# Patient Record
Sex: FEMALE | Race: WHITE | NOT HISPANIC OR LATINO | Employment: OTHER | URBAN - METROPOLITAN AREA
[De-identification: names, ages, dates, MRNs, and addresses within clinical notes are randomized per-mention and may not be internally consistent; named-entity substitution may affect disease eponyms.]

---

## 2017-07-06 ENCOUNTER — HOSPITAL ENCOUNTER (OUTPATIENT)
Dept: RADIOLOGY | Facility: MEDICAL CENTER | Age: 72
End: 2017-07-06
Attending: INTERNAL MEDICINE
Payer: MEDICARE

## 2017-07-06 DIAGNOSIS — R52 PAIN: ICD-10-CM

## 2017-07-06 DIAGNOSIS — Z12.31 VISIT FOR SCREENING MAMMOGRAM: ICD-10-CM

## 2017-07-06 PROCEDURE — 77063 BREAST TOMOSYNTHESIS BI: CPT

## 2017-07-06 PROCEDURE — 73522 X-RAY EXAM HIPS BI 3-4 VIEWS: CPT

## 2018-07-25 ENCOUNTER — HOSPITAL ENCOUNTER (OUTPATIENT)
Dept: RADIOLOGY | Facility: MEDICAL CENTER | Age: 73
End: 2018-07-25
Attending: INTERNAL MEDICINE
Payer: MEDICARE

## 2018-07-25 DIAGNOSIS — Z12.31 VISIT FOR SCREENING MAMMOGRAM: ICD-10-CM

## 2018-07-25 PROCEDURE — 77067 SCR MAMMO BI INCL CAD: CPT

## 2019-03-12 ENCOUNTER — PATIENT OUTREACH (OUTPATIENT)
Dept: HEALTH INFORMATION MANAGEMENT | Facility: OTHER | Age: 74
End: 2019-03-12

## 2019-03-12 NOTE — PROGRESS NOTES
Call Back at Later Time - Patient stated she will call back after 4/16/19 she wants to wait until the provider recommends her with a good primary provider.     Please transfer to Patient Outreach Team at [phone] when patient returns call.    WebIZ Checked & Epic Updated:  yes  CPOX (Varicella)   Tdap   Zoster Tommy (Shingrix)     [facility] Verified: yes  [facility] Provider-Maryjo Rutledge  [facility] Provider-Yamel Mae    Attempt # 1

## 2019-03-27 ENCOUNTER — OFFICE VISIT (OUTPATIENT)
Dept: URGENT CARE | Facility: PHYSICIAN GROUP | Age: 74
End: 2019-03-27
Payer: MEDICARE

## 2019-03-27 ENCOUNTER — HOSPITAL ENCOUNTER (EMERGENCY)
Facility: MEDICAL CENTER | Age: 74
End: 2019-03-27
Attending: EMERGENCY MEDICINE
Payer: MEDICARE

## 2019-03-27 VITALS
HEIGHT: 62 IN | BODY MASS INDEX: 23.21 KG/M2 | DIASTOLIC BLOOD PRESSURE: 98 MMHG | RESPIRATION RATE: 16 BRPM | WEIGHT: 126.1 LBS | HEART RATE: 83 BPM | OXYGEN SATURATION: 95 % | SYSTOLIC BLOOD PRESSURE: 174 MMHG | TEMPERATURE: 98.7 F

## 2019-03-27 VITALS
SYSTOLIC BLOOD PRESSURE: 144 MMHG | WEIGHT: 125 LBS | OXYGEN SATURATION: 95 % | DIASTOLIC BLOOD PRESSURE: 100 MMHG | HEART RATE: 89 BPM | BODY MASS INDEX: 23 KG/M2 | TEMPERATURE: 98.1 F | HEIGHT: 62 IN | RESPIRATION RATE: 12 BRPM

## 2019-03-27 DIAGNOSIS — S61.411A LACERATION OF RIGHT HAND, FOREIGN BODY PRESENCE UNSPECIFIED, INITIAL ENCOUNTER: ICD-10-CM

## 2019-03-27 DIAGNOSIS — S61.011A LACERATION OF RIGHT THUMB WITHOUT DAMAGE TO NAIL, FOREIGN BODY PRESENCE UNSPECIFIED, INITIAL ENCOUNTER: ICD-10-CM

## 2019-03-27 PROCEDURE — 90471 IMMUNIZATION ADMIN: CPT

## 2019-03-27 PROCEDURE — 303485 HCHG DRESSING MEDIUM

## 2019-03-27 PROCEDURE — 304217 HCHG IRRIGATION SYSTEM

## 2019-03-27 PROCEDURE — 99202 OFFICE O/P NEW SF 15 MIN: CPT | Performed by: PHYSICIAN ASSISTANT

## 2019-03-27 PROCEDURE — 90715 TDAP VACCINE 7 YRS/> IM: CPT

## 2019-03-27 PROCEDURE — 700111 HCHG RX REV CODE 636 W/ 250 OVERRIDE (IP)

## 2019-03-27 PROCEDURE — 99283 EMERGENCY DEPT VISIT LOW MDM: CPT

## 2019-03-27 PROCEDURE — 303747 HCHG EXTRA SUTURE

## 2019-03-27 PROCEDURE — 304999 HCHG REPAIR-SIMPLE/INTERMED LEVEL 1

## 2019-03-27 RX ORDER — AMPICILLIN TRIHYDRATE 250 MG
CAPSULE ORAL
COMMUNITY
End: 2020-06-10

## 2019-03-27 RX ORDER — LOSARTAN POTASSIUM 100 MG/1
100 TABLET ORAL
Refills: 3 | COMMUNITY
Start: 2019-02-08 | End: 2019-11-27

## 2019-03-27 RX ADMIN — CLOSTRIDIUM TETANI TOXOID ANTIGEN (FORMALDEHYDE INACTIVATED), CORYNEBACTERIUM DIPHTHERIAE TOXOID ANTIGEN (FORMALDEHYDE INACTIVATED), BORDETELLA PERTUSSIS TOXOID ANTIGEN (GLUTARALDEHYDE INACTIVATED), BORDETELLA PERTUSSIS FILAMENTOUS HEMAGGLUTININ ANTIGEN (FORMALDEHYDE INACTIVATED), BORDETELLA PERTUSSIS PERTACTIN ANTIGEN, AND BORDETELLA PERTUSSIS FIMBRIAE 2/3 ANTIGEN 0.5 ML: 5; 2; 2.5; 5; 3; 5 INJECTION, SUSPENSION INTRAMUSCULAR at 21:16

## 2019-03-27 ASSESSMENT — ENCOUNTER SYMPTOMS
VOMITING: 0
EYE DISCHARGE: 0
HEADACHES: 0
NAUSEA: 0
CHILLS: 0
STRIDOR: 0
FEVER: 0
ABDOMINAL PAIN: 0
SHORTNESS OF BREATH: 0
COUGH: 0

## 2019-03-27 ASSESSMENT — PAIN SCALES - GENERAL: PAINLEVEL: 4=SLIGHT-MODERATE PAIN

## 2019-03-28 NOTE — PROGRESS NOTES
Subjective:      Neli Hernandez is a 73 y.o. female who presents with Laceration (left thumb with a knife today)            Laceration    The incident occurred 1 to 3 hours ago. The laceration is located on the right hand (distal aspect of right thumb). The laceration is 2 cm in size. The laceration mechanism was a metal edge (Patient was cooking and cut her right thumb on a dicer.). The pain is mild. The pain has been intermittent since onset. She reports no foreign bodies present. Her tetanus status is unknown.     PMH:  has no past medical history on file.  MEDS:   Current Outpatient Prescriptions:   •  losartan (COZAAR) 100 MG Tab, Take 100 mg by mouth every day., Disp: , Rfl: 3  •  Coenzyme Q10 (COQ10) 200 MG Cap, , Disp: , Rfl:   •  levothyroxine (SYNTHROID) 50 MCG TABS, Take 50 mcg by mouth every day., Disp: , Rfl:   •  Cholecalciferol (VITAMIN D3) 2000 UNIT CAPS, Take 1 Cap by mouth every day., Disp: , Rfl:   •  Calcium-Magnesium (VISHAL/MAG PO), Take 1 Cap by mouth every day., Disp: , Rfl:   •  lovastatin (MEVACOR) 40 MG tablet, Take 40 mg by mouth every day., Disp: , Rfl:   ALLERGIES:   Allergies   Allergen Reactions   • Penicillins      SURGHX:   Past Surgical History:   Procedure Laterality Date   • OTHER ORTHOPEDIC SURGERY     • US-CYST ASPIRATION-BREAST INITIAL Left 1990's     SOCHX:  reports that she has never smoked. She has never used smokeless tobacco. She reports that she does not drink alcohol or use drugs.  FH: Family history was reviewed, no pertinent findings to report      Review of Systems   Constitutional: Negative for chills and fever.   HENT: Negative for congestion and ear pain.    Eyes: Negative for discharge.   Respiratory: Negative for cough, shortness of breath and stridor.    Cardiovascular: Negative for chest pain and leg swelling.   Gastrointestinal: Negative for abdominal pain, nausea and vomiting.   Genitourinary: Negative for dysuria, frequency and urgency.   Musculoskeletal:  "Positive for joint pain.   Skin: Negative for rash.   Neurological: Negative for headaches.   All other systems reviewed and are negative.            Objective:     /100   Pulse 89   Temp 36.7 °C (98.1 °F)   Resp 12   Ht 1.575 m (5' 2\")   Wt 56.7 kg (125 lb)   SpO2 95%   BMI 22.86 kg/m²      Physical Exam   Constitutional: She is oriented to person, place, and time. She appears well-developed and well-nourished. No distress.   HENT:   Head: Normocephalic and atraumatic.   Right Ear: External ear normal.   Left Ear: External ear normal.   Nose: Nose normal.   Mouth/Throat: Oropharynx is clear and moist.   Eyes: Conjunctivae and EOM are normal.   Neck: Normal range of motion. Neck supple.   Cardiovascular: Normal rate.    Pulmonary/Chest: Effort normal.   Musculoskeletal: Normal range of motion. She exhibits no edema.        Right hand: She exhibits normal range of motion.        Hands:  Right Hand:  2-3cm laceration to distal right thumb  Bleeding not controlled  ROM intact  Sensation intact     Neurological: She is alert and oriented to person, place, and time.   Skin: Laceration noted.          Spoke with Dr. Vega (ER Physician) who accepted the patient for transfer.      Assessment/Plan:     1. Laceration  The patient's presenting symptoms and physical exam are consistent with a laceration to her distal right thumb. The patient was cooking when she cut her thumb on a dicer. The patient's bleeding was not controlled during her initial examation. She denies any use of blood thinners. Additionally, the patient was found to have thin skin at the site of the laceration. Given the location of the patient's laceration, and difficulty controlling her bleeding, I recommend the patient be transferred to the ED for further care, as I did not feel comfortable in closing the wound. Discussed this option with the patient, and she agreed. Called [facility], and spoke with Dr. Vega who accepted the " patient for transfer. The patient is stable for transfer via private vehicle at this time. Applied a pressure dressing to the patient's laceration prior to transfer.   Plan:  Transfer to [facility] ED for further evaluation and care.

## 2019-03-28 NOTE — ED NOTES
Discharge information provided. Pt verbalized understanding of discharge instructions to follow up with PCP and to return to ER if condition worsens. Pt ambulated out of ER in a steady gait, no additional questions or concerns. Educated on wound care and suture care. Dressing in place.

## 2019-03-28 NOTE — ED PROVIDER NOTES
"CHIEF COMPLAINT   Chief Complaint   Patient presents with   • Sent from Urgent Care   • Hand Laceration       HPI   Neli Hernandez is a 73 y.o. female who presents with laceration to her thumb pad this evening with a knife.  She went to urgent care and she was sent here for closure.  Tetanus is out of date.  No other injuries.  No numbness tingling or weakness.  All other systems are negative    REVIEW OF SYSTEMS   See HPI for further details.      PAST MEDICAL HISTORY   No past medical history on file.    FAMILY HISTORY  Family History   Problem Relation Age of Onset   • Cancer Maternal Aunt         breast       SOCIAL HISTORY  Social History     Social History   • Marital status:      Spouse name: N/A   • Number of children: N/A   • Years of education: N/A     Social History Main Topics   • Smoking status: Never Smoker   • Smokeless tobacco: Never Used   • Alcohol use No   • Drug use: No   • Sexual activity: Not on file     Other Topics Concern   • Not on file     Social History Narrative   • No narrative on file       SURGICAL HISTORY  Past Surgical History:   Procedure Laterality Date   • OTHER ORTHOPEDIC SURGERY     • US-CYST ASPIRATION-BREAST INITIAL Left 1990's       CURRENT MEDICATIONS   Home Medications     Reviewed by Nabila Sykes (ED Tech) on 03/27/19 at 2017  Med List Status: Partial   Medication Last Dose Status   Calcium-Magnesium (VISHAL/MAG PO)  Active   Cholecalciferol (VITAMIN D3) 2000 UNIT CAPS  Active   Coenzyme Q10 (COQ10) 200 MG Cap  Active   levothyroxine (SYNTHROID) 50 MCG TABS  Active   losartan (COZAAR) 100 MG Tab  Active   lovastatin (MEVACOR) 40 MG tablet  Active                ALLERGIES   Allergies   Allergen Reactions   • Penicillins        PHYSICAL EXAM  VITAL SIGNS: BP (!) 203/100   Pulse 94   Temp 36.9 °C (98.5 °F) (Temporal)   Resp 18   Ht 1.575 m (5' 2\")   Wt 57.2 kg (126 lb 1.7 oz)   SpO2 94%   BMI 23.06 kg/m²   Constitutional: Patient is alert and oriented x3 in   " distress   Cardiovascular: Heart rate rhythmic and regular  Thorax & Lungs: Clear to auscultation  Skin: Warm dry no rashes  Extremities: Thumb pad on the right hand has a 2 cm laceration transversely oriented across the middle aspect of the  Neurologic: Normal motor sensation  Vascular: Good capillary refill      Procedure: Digital block was placed with 1% lidocaine without epinephrine.  Wound was then irrigated under the sink prep drape and sutured with interlocking 4-0 Ethilon with good closure and hemostasis.    COURSE & MEDICAL DECISION MAKING  Pertinent Labs & Imaging studies reviewed. (See chart for details)  Patient is being discharged with finger bandage and antibiotic ointment treatment sutures out in 7 days return precautions    FINAL IMPRESSION  1.   1. Laceration of right hand, foreign body presence unspecified, initial encounter        2.   3.      Electronically signed by: Vincent Vega, 3/27/2019 9:13 PM

## 2019-03-28 NOTE — ED NOTES
Pt assessed. Cut right thumb on vegetable dicer, sent by  for sutures. Thumb currently wrapped, pt states it was difficult to get bleeding under control. -anticoagulant    Pt has full ROM, CMS intact. Needs Tetanus vaccine  Will cont to monitor

## 2019-03-28 NOTE — ED TRIAGE NOTES
Pt bib self with c/o a laceration to her right thumb. Pt accidentally lacerated her thumb on a vegetable slicer. Pt unsure of her last tetanus vaccination.

## 2019-04-03 ENCOUNTER — OFFICE VISIT (OUTPATIENT)
Dept: URGENT CARE | Facility: PHYSICIAN GROUP | Age: 74
End: 2019-04-03

## 2019-04-03 DIAGNOSIS — Z48.02 ENCOUNTER FOR REMOVAL OF SUTURES: ICD-10-CM

## 2019-04-03 PROCEDURE — 99212 OFFICE O/P EST SF 10 MIN: CPT | Performed by: FAMILY MEDICINE

## 2019-04-04 NOTE — PROGRESS NOTES
Subjective:      Neli Hernandez is a 73 y.o. female who presents with No chief complaint on file.            She is here for suture removal on the right thumb.  This was placed a week ago.  No fever.  Doing otherwise well        ROS       Objective:     There were no vitals taken for this visit.     Physical Exam   Constitutional: She is oriented to person, place, and time. She appears well-developed and well-nourished. No distress.   HENT:   Head: Normocephalic and atraumatic.   Eyes: Conjunctivae are normal.   Cardiovascular: Normal rate.    Pulmonary/Chest: Effort normal.   Musculoskeletal:   1.5 cm suture noted on the right thumb which is healed well, no surrounding erythema.  Full range of motion in the right thumb otherwise.  Neurovascular intact.  Sutures were removed and noted that it was a continuous suture rather than interrupted.  There was a about 2-3 mm of laceration that has slightly superficial opening but there was no bleeding.  I decided to use Steri-Strip and put couple Steri-Strips on the wound   Neurological: She is alert and oriented to person, place, and time.   Skin: Skin is warm. She is not diaphoretic.   Psychiatric: She has a normal mood and affect.               Assessment/Plan:     1. Encounter for removal of sutures      Suture removed as mentioned above  2 Steri-Strip placed  We discussed keeping it nice and dry  Plan per orders and instructions  Warning signs reviewed

## 2019-04-04 NOTE — PATIENT INSTRUCTIONS
Stitches, Staples, or Adhesive Wound Closure  Doctors use stitches (sutures), staples, and certain glue (skin adhesives) to hold your skin together while it heals (wound closure). You may need this treatment after you have surgery or if you cut your skin accidentally. These methods help your skin heal more quickly. They also make it less likely that you will have a scar.  What are the different kinds of wound closures?  There are many options for wound closure. The one that your doctor uses depends on how deep and large your wound is.  Adhesive Strips   These strips are made of sticky (adhesive), porous paper. They are placed across your skin edges like a regular adhesive bandage. You leave them on until they fall off.  Adhesive strips may be used to close very superficial wounds. They may also be used along with sutures to improve closure of your skin edges.      How do I care for my wound closure?  · Take medicines only as told by your doctor.  · If you were prescribed an antibiotic medicine for your wound, finish it all even if you start to feel better.  · Use ointments or creams only as told by your doctor.  · Wash your hands with soap and water before and after touching your wound.  · Do not soak your wound in water. Do not take baths, swim, or use a hot tub until your doctor says it is okay.  · Ask your doctor when you can start showering. Cover your wound if told by your doctor.  · Do not take out your own sutures or staples.  · Do not pick at your wound. Picking can cause an infection.  · Keep all follow-up visits as told by your doctor. This is important.  How long will I have my wound closure?  · Leave adhesive glue on your skin until the glue peels away.  · Leave adhesive strips on your skin until they fall off.  · Absorbable sutures will dissolve within several days.  · Nonabsorbable sutures and staples must be removed. The location of the wound will determine how long they stay in. This can range from  several days to a couple of weeks.  When should I seek help for my wound closure?  Contact your doctor if:  · You have a fever.  · You have chills.  · You have redness, puffiness (swelling), or pain at the site of your wound.  · You have fluid, blood, or pus coming from your wound.  · There is a bad smell coming from your wound.  · The skin edges of your wound start to separate after your sutures have been removed.  · Your wound becomes thick, raised, and darker in color after your sutures come out (scarring).  This information is not intended to replace advice given to you by your health care provider. Make sure you discuss any questions you have with your health care provider.  Document Released: 10/15/2010 Document Revised: 08/16/2017 Document Reviewed: 05/27/2015  [employer] Interactive Patient Education © 2017 [employer]

## 2019-04-16 ENCOUNTER — APPOINTMENT (OUTPATIENT)
Dept: ENDOCRINOLOGY | Facility: MEDICAL CENTER | Age: 74
End: 2019-04-16
Payer: MEDICARE

## 2019-07-15 ENCOUNTER — OFFICE VISIT (OUTPATIENT)
Dept: ENDOCRINOLOGY | Facility: MEDICAL CENTER | Age: 74
End: 2019-07-15
Payer: MEDICARE

## 2019-07-15 VITALS
HEIGHT: 62 IN | HEART RATE: 99 BPM | OXYGEN SATURATION: 93 % | SYSTOLIC BLOOD PRESSURE: 156 MMHG | WEIGHT: 130.2 LBS | BODY MASS INDEX: 23.96 KG/M2 | DIASTOLIC BLOOD PRESSURE: 84 MMHG

## 2019-07-15 DIAGNOSIS — E78.5 HYPERLIPIDEMIA, UNSPECIFIED HYPERLIPIDEMIA TYPE: ICD-10-CM

## 2019-07-15 DIAGNOSIS — I10 HYPERTENSION, UNSPECIFIED TYPE: ICD-10-CM

## 2019-07-15 DIAGNOSIS — R73.9 HYPERGLYCEMIA: ICD-10-CM

## 2019-07-15 DIAGNOSIS — E89.0 HYPOTHYROIDISM, POSTSURGICAL: ICD-10-CM

## 2019-07-15 PROCEDURE — 99204 OFFICE O/P NEW MOD 45 MIN: CPT | Performed by: INTERNAL MEDICINE

## 2019-07-15 RX ORDER — MULTIVITAMIN WITH IRON
TABLET ORAL
COMMUNITY
Start: 2017-01-20 | End: 2022-06-06

## 2019-07-15 RX ORDER — LOSARTAN POTASSIUM 100 MG/1
TABLET ORAL
COMMUNITY
Start: 2018-11-16 | End: 2019-11-27 | Stop reason: SDUPTHER

## 2019-07-15 RX ORDER — LOVASTATIN 40 MG/1
TABLET ORAL
COMMUNITY
Start: 2018-11-28 | End: 2019-11-27 | Stop reason: SDUPTHER

## 2019-07-15 RX ORDER — LEVOTHYROXINE SODIUM 0.05 MG/1
TABLET ORAL
COMMUNITY
Start: 2018-03-08 | End: 2019-11-27 | Stop reason: SDUPTHER

## 2019-07-15 RX ORDER — ACETAMINOPHEN 160 MG
TABLET,DISINTEGRATING ORAL
COMMUNITY
Start: 2016-04-22 | End: 2019-11-27

## 2019-07-16 NOTE — PROGRESS NOTES
Chief Complaint   Patient presents with   • Hypothyroidism     Partial thyroidectomy, benign        HPI:        CHIEF COMPLAINT:   A former patient of mine referred by Dr. Mae primarily because of a change in insurance coverage.  The patient will be seeking to establish with a new PCP because Dr. Mae will not be covered under her new insurance.  I will be covered but she understands that I am practicing endocrinology only and not general internal medicine.    PRESENT ILLNESS:     I have seen this patient many years ago and those records are archived and are not readily available.  I do have Dr. Mae’s current and very complete medical records including laboratory data, current as of January this year.  The patient has a remote history of hypothyroidism.  She had a partial thyroidectomy many years ago.  There was no malignancy involved and I don’t know how much thyroid was removed.  She has been on thyroid hormone ever since.     She has been experiencing some dryness of her skin and her hair thinning.  She has been having some difficulty controlling her weight but she is by no means overweight currently.      She is also indicating she has more frequent bowel movements but with very small caliber stools. She describes an austere diet with not much in the way of fruits and vegetables.  I think her bowel habit reflects a very low bulk diet and she freely admits to that.  She will try to increase her fruits and vegetables and see if the diet and bowel habit changes.    She also understands that I will only be seeing her for her thyroid condition and I will reassess that.  In the meantime, she will seek out a new PCP and establish for other ongoing care.  To expedite that, I will give her lab orders that will be more comprehensive so as not to leave too much of a gap in her care.    Dr. Mae’s notes are very clear.  She is treated for hypertension with Losartan.  She takes lovastatin for hyperlipidemia.   In the past there has been some liver function abnormality but most recently normal.  There also is a comment that she has had an elevated glucose in the past.    She is currently taking levothyroxine 50 mcg per day which was normal at 1.2 in January.  Also there is a history of osteoporosis and she may need to update her bone density as well.      ROS:  Bowel habit change see HPI      Allergies:   Allergies   Allergen Reactions   • Penicillins        Current medicines including changes today:  Current Outpatient Prescriptions   Medication Sig Dispense Refill   • Magnesium 250 MG Tab      • Zoster Vac Recomb Adjuvanted (SHINGRIX) 50 MCG/0.5ML Recon Susp inject 0.5 milliliter by intramuscular route now & repeat same in 2-6 months.     • levothyroxine (LEVO-T) 50 MCG Tab TAKE 1 TAB BY MOUTH EVERY MON, WED, FRI, SAT AND SUN, AND 2 TABS ON TUES, AND THURSDAYS     • losartan (COZAAR) 100 MG Tab TAKE 1 TABLET BY MOUTH EVERY DAY     • lovastatin (MEVACOR) 40 MG tablet TAKE 1 TABLET BY MOUTH EVERY DAY WITH EVENING MEAL     • Cholecalciferol (VITAMIN D3) 2000 UNIT Cap      • losartan (COZAAR) 100 MG Tab Take 100 mg by mouth every day.  3   • levothyroxine (SYNTHROID) 50 MCG TABS Take 50 mcg by mouth every day.     • Calcium-Magnesium (VISHAL/MAG PO) Take 1 Cap by mouth every day.     • lovastatin (MEVACOR) 40 MG tablet Take 40 mg by mouth every day.     • Coenzyme Q10 (COQ10) 200 MG Cap      • Cholecalciferol (VITAMIN D3) 2000 UNIT CAPS Take 1 Cap by mouth every day.       No current facility-administered medications for this visit.        See HPI    History reviewed. No pertinent past medical history.    Family history                   No family history of thyroid disease or osteoporosis    Social history           Patient is retired and .           She does not smoke cigarettes and rarely drinks alcohol            PHYSICAL EXAM:    /84 (BP Location: Left arm, Patient Position: Sitting, BP Cuff Size: Adult)    "Pulse 99   Ht 1.575 m (5' 2.01\")   Wt 59.1 kg (130 lb 3.2 oz)   SpO2 93%   BMI 23.81 kg/m²     Gen.   appears healthy     Skin   appropriate for sex and age    HEENT  unremarkable    Neck   no palpable thyroid post thyroidectomy    Heart  regular    Extremities  no edema    Neuro  gait and station normal    Psych  appropriate    ASSESSMENT AND RECOMMENDATIONS    1. Hypothyroidism, postsurgical                Update thyroid blood levels and review  - FREE THYROXINE; Future  - TSH; Future    2. Hyperlipidemia, unspecified hyperlipidemia type              - Lipid Profile; Future    3. Hypertension, unspecified type    - CBC WITH DIFFERENTIAL; Future  - Comp Metabolic Panel; Future  - UA/M W/RFLX CULTURE, ROUTINE  - Lipid Profile; Future    4. Hyperglycemia    - HEMOGLOBIN A1C; Future      DISPOSITION: Patient will establish with a new PCP to assume management of the above medical problems apart from hypothyroidism.  We will review labs by telephone      Jamal Win M.D.    Copies to: Yamel Mae M.D. [phone]  "

## 2019-07-17 ENCOUNTER — TELEPHONE (OUTPATIENT)
Dept: SCHEDULING | Facility: IMAGING CENTER | Age: 74
End: 2019-07-17

## 2019-08-15 ENCOUNTER — HOSPITAL ENCOUNTER (OUTPATIENT)
Dept: LAB | Facility: MEDICAL CENTER | Age: 74
End: 2019-08-15
Attending: INTERNAL MEDICINE
Payer: MEDICARE

## 2019-08-15 DIAGNOSIS — R73.9 HYPERGLYCEMIA: ICD-10-CM

## 2019-08-15 DIAGNOSIS — I10 HYPERTENSION, UNSPECIFIED TYPE: ICD-10-CM

## 2019-08-15 DIAGNOSIS — E78.5 HYPERLIPIDEMIA, UNSPECIFIED HYPERLIPIDEMIA TYPE: ICD-10-CM

## 2019-08-15 DIAGNOSIS — E89.0 HYPOTHYROIDISM, POSTSURGICAL: ICD-10-CM

## 2019-08-15 LAB
ALBUMIN SERPL BCP-MCNC: 4.5 G/DL (ref 3.2–4.9)
ALBUMIN/GLOB SERPL: 1.5 G/DL
ALP SERPL-CCNC: 96 U/L (ref 30–99)
ALT SERPL-CCNC: 41 U/L (ref 2–50)
ANION GAP SERPL CALC-SCNC: 10 MMOL/L (ref 0–11.9)
APPEARANCE UR: CLEAR
AST SERPL-CCNC: 26 U/L (ref 12–45)
BASOPHILS # BLD AUTO: 1.1 % (ref 0–1.8)
BASOPHILS # BLD: 0.08 K/UL (ref 0–0.12)
BILIRUB SERPL-MCNC: 0.6 MG/DL (ref 0.1–1.5)
BILIRUB UR QL STRIP.AUTO: NEGATIVE
BUN SERPL-MCNC: 20 MG/DL (ref 8–22)
CALCIUM SERPL-MCNC: 9.8 MG/DL (ref 8.5–10.5)
CHLORIDE SERPL-SCNC: 104 MMOL/L (ref 96–112)
CHOLEST SERPL-MCNC: 202 MG/DL (ref 100–199)
CO2 SERPL-SCNC: 24 MMOL/L (ref 20–33)
COLOR UR: YELLOW
CREAT SERPL-MCNC: 0.69 MG/DL (ref 0.5–1.4)
EOSINOPHIL # BLD AUTO: 0.22 K/UL (ref 0–0.51)
EOSINOPHIL NFR BLD: 3 % (ref 0–6.9)
ERYTHROCYTE [DISTWIDTH] IN BLOOD BY AUTOMATED COUNT: 45.7 FL (ref 35.9–50)
EST. AVERAGE GLUCOSE BLD GHB EST-MCNC: 126 MG/DL
FASTING STATUS PATIENT QL REPORTED: NORMAL
GLOBULIN SER CALC-MCNC: 3 G/DL (ref 1.9–3.5)
GLUCOSE SERPL-MCNC: 111 MG/DL (ref 65–99)
GLUCOSE UR STRIP.AUTO-MCNC: NEGATIVE MG/DL
HBA1C MFR BLD: 6 % (ref 0–5.6)
HCT VFR BLD AUTO: 46.7 % (ref 37–47)
HDLC SERPL-MCNC: 35 MG/DL
HGB BLD-MCNC: 15.4 G/DL (ref 12–16)
IMM GRANULOCYTES # BLD AUTO: 0.04 K/UL (ref 0–0.11)
IMM GRANULOCYTES NFR BLD AUTO: 0.5 % (ref 0–0.9)
KETONES UR STRIP.AUTO-MCNC: NEGATIVE MG/DL
LDLC SERPL CALC-MCNC: ABNORMAL MG/DL
LEUKOCYTE ESTERASE UR QL STRIP.AUTO: NEGATIVE
LYMPHOCYTES # BLD AUTO: 1.56 K/UL (ref 1–4.8)
LYMPHOCYTES NFR BLD: 21.1 % (ref 22–41)
MCH RBC QN AUTO: 31.5 PG (ref 27–33)
MCHC RBC AUTO-ENTMCNC: 33 G/DL (ref 33.6–35)
MCV RBC AUTO: 95.5 FL (ref 81.4–97.8)
MICRO URNS: NORMAL
MONOCYTES # BLD AUTO: 0.6 K/UL (ref 0–0.85)
MONOCYTES NFR BLD AUTO: 8.1 % (ref 0–13.4)
NEUTROPHILS # BLD AUTO: 4.89 K/UL (ref 2–7.15)
NEUTROPHILS NFR BLD: 66.2 % (ref 44–72)
NITRITE UR QL STRIP.AUTO: NEGATIVE
NRBC # BLD AUTO: 0 K/UL
NRBC BLD-RTO: 0 /100 WBC
PH UR STRIP.AUTO: 6 [PH] (ref 5–8)
PLATELET # BLD AUTO: 278 K/UL (ref 164–446)
PMV BLD AUTO: 10.7 FL (ref 9–12.9)
POTASSIUM SERPL-SCNC: 4.2 MMOL/L (ref 3.6–5.5)
PROT SERPL-MCNC: 7.5 G/DL (ref 6–8.2)
PROT UR QL STRIP: NEGATIVE MG/DL
RBC # BLD AUTO: 4.89 M/UL (ref 4.2–5.4)
RBC UR QL AUTO: NEGATIVE
SODIUM SERPL-SCNC: 138 MMOL/L (ref 135–145)
SP GR UR STRIP.AUTO: 1.01
T4 FREE SERPL-MCNC: 1.1 NG/DL (ref 0.53–1.43)
TRIGL SERPL-MCNC: 442 MG/DL (ref 0–149)
TSH SERPL DL<=0.005 MIU/L-ACNC: 2.89 UIU/ML (ref 0.38–5.33)
UROBILINOGEN UR STRIP.AUTO-MCNC: 0.2 MG/DL
WBC # BLD AUTO: 7.4 K/UL (ref 4.8–10.8)

## 2019-08-15 PROCEDURE — 80061 LIPID PANEL: CPT

## 2019-08-15 PROCEDURE — 83036 HEMOGLOBIN GLYCOSYLATED A1C: CPT

## 2019-08-15 PROCEDURE — 80053 COMPREHEN METABOLIC PANEL: CPT

## 2019-08-15 PROCEDURE — 36415 COLL VENOUS BLD VENIPUNCTURE: CPT

## 2019-08-15 PROCEDURE — 84439 ASSAY OF FREE THYROXINE: CPT

## 2019-08-15 PROCEDURE — 84443 ASSAY THYROID STIM HORMONE: CPT

## 2019-08-15 PROCEDURE — 81003 URINALYSIS AUTO W/O SCOPE: CPT

## 2019-08-15 PROCEDURE — 85025 COMPLETE CBC W/AUTO DIFF WBC: CPT

## 2019-08-16 ENCOUNTER — HOSPITAL ENCOUNTER (OUTPATIENT)
Dept: RADIOLOGY | Facility: MEDICAL CENTER | Age: 74
End: 2019-08-16
Attending: INTERNAL MEDICINE | Admitting: INTERNAL MEDICINE
Payer: MEDICARE

## 2019-08-16 DIAGNOSIS — Z12.31 VISIT FOR SCREENING MAMMOGRAM: ICD-10-CM

## 2019-08-16 PROCEDURE — 77063 BREAST TOMOSYNTHESIS BI: CPT

## 2019-08-26 ENCOUNTER — TELEPHONE (OUTPATIENT)
Dept: ENDOCRINOLOGY | Facility: MEDICAL CENTER | Age: 74
End: 2019-08-26

## 2019-08-27 NOTE — TELEPHONE ENCOUNTER
Telephone conversation with patient    Laboratory data reviewed and all looks good except for marginal blood glucose and low HDL / high triglycerides.  Apparently Dr. Mae has addressed that by getting other tests including particle numbers which I do not have right at hand.  She does have a new PCP who will address this around the end of October.    I do not see any reason to change her thyroid dose. TSH and T4 are in good range.    Jamal Win M.D.

## 2019-08-28 RX ORDER — LOSARTAN POTASSIUM 100 MG/1
100 TABLET ORAL
Qty: 30 TAB | Refills: 3 | OUTPATIENT
Start: 2019-08-28

## 2019-08-28 NOTE — TELEPHONE ENCOUNTER
Was the patient seen in the last year in this department? Yes    Does patient have an active prescription for medications requested? Yes    Received Request Via: Patient -Patient said she spoke with you the other day and you are ok with filling this medication until she sees her PCP.    **Last office visit 7/15/19**

## 2019-10-29 ENCOUNTER — TELEPHONE (OUTPATIENT)
Dept: SCHEDULING | Facility: IMAGING CENTER | Age: 74
End: 2019-10-29

## 2019-10-31 ENCOUNTER — APPOINTMENT (OUTPATIENT)
Dept: MEDICAL GROUP | Facility: MEDICAL CENTER | Age: 74
End: 2019-10-31
Payer: MEDICARE

## 2019-11-27 ENCOUNTER — OFFICE VISIT (OUTPATIENT)
Dept: MEDICAL GROUP | Facility: MEDICAL CENTER | Age: 74
End: 2019-11-27
Payer: MEDICARE

## 2019-11-27 VITALS
RESPIRATION RATE: 14 BRPM | DIASTOLIC BLOOD PRESSURE: 88 MMHG | HEART RATE: 104 BPM | OXYGEN SATURATION: 97 % | BODY MASS INDEX: 23.53 KG/M2 | HEIGHT: 62 IN | TEMPERATURE: 99.4 F | SYSTOLIC BLOOD PRESSURE: 144 MMHG | WEIGHT: 127.87 LBS

## 2019-11-27 DIAGNOSIS — R73.03 PREDIABETES: ICD-10-CM

## 2019-11-27 DIAGNOSIS — E55.9 VITAMIN D DEFICIENCY: ICD-10-CM

## 2019-11-27 DIAGNOSIS — Z78.0 MENOPAUSE: ICD-10-CM

## 2019-11-27 DIAGNOSIS — M85.89 OSTEOPENIA OF MULTIPLE SITES: ICD-10-CM

## 2019-11-27 DIAGNOSIS — E03.9 ACQUIRED HYPOTHYROIDISM: ICD-10-CM

## 2019-11-27 DIAGNOSIS — E78.5 HYPERLIPIDEMIA, UNSPECIFIED HYPERLIPIDEMIA TYPE: ICD-10-CM

## 2019-11-27 DIAGNOSIS — I10 BENIGN HYPERTENSION: ICD-10-CM

## 2019-11-27 DIAGNOSIS — E89.0 H/O PARTIAL THYROIDECTOMY: ICD-10-CM

## 2019-11-27 PROBLEM — M25.552 PAIN OF BOTH HIP JOINTS: Status: RESOLVED | Noted: 2017-01-20 | Resolved: 2019-11-27

## 2019-11-27 PROBLEM — M25.551 PAIN OF BOTH HIP JOINTS: Status: RESOLVED | Noted: 2017-01-20 | Resolved: 2019-11-27

## 2019-11-27 PROBLEM — M25.551 PAIN OF BOTH HIP JOINTS: Status: ACTIVE | Noted: 2017-01-20

## 2019-11-27 PROBLEM — M25.552 PAIN OF BOTH HIP JOINTS: Status: ACTIVE | Noted: 2017-01-20

## 2019-11-27 PROBLEM — R79.89 ABNORMAL LIVER FUNCTION TESTS: Status: ACTIVE | Noted: 2018-03-08

## 2019-11-27 PROCEDURE — 99205 OFFICE O/P NEW HI 60 MIN: CPT | Performed by: INTERNAL MEDICINE

## 2019-11-27 RX ORDER — LEVOTHYROXINE SODIUM 0.05 MG/1
50 TABLET ORAL
Qty: 114 TAB | Refills: 3 | Status: SHIPPED | OUTPATIENT
Start: 2019-11-27 | End: 2020-12-14 | Stop reason: SDUPTHER

## 2019-11-27 RX ORDER — INFLUENZA A VIRUS A/MICHIGAN/45/2015 X-275 (H1N1) ANTIGEN (FORMALDEHYDE INACTIVATED), INFLUENZA A VIRUS A/SINGAPORE/INFIMH-16-0019/2016 IVR-186 (H3N2) ANTIGEN (FORMALDEHYDE INACTIVATED), AND INFLUENZA B VIRUS B/MARYLAND/15/2016 BX-69A (A B/COLORADO/6/2017-LIKE VIRUS) ANTIGEN (FORMALDEHYDE INACTIVATED) 60; 60; 60 UG/.5ML; UG/.5ML; UG/.5ML
INJECTION, SUSPENSION INTRAMUSCULAR
Refills: 0 | COMMUNITY
Start: 2019-10-24 | End: 2020-12-14

## 2019-11-27 RX ORDER — LOVASTATIN 40 MG/1
40 TABLET ORAL DAILY
Qty: 90 TAB | Refills: 3 | Status: SHIPPED | OUTPATIENT
Start: 2019-11-27 | End: 2020-12-14 | Stop reason: SDUPTHER

## 2019-11-27 RX ORDER — LOSARTAN POTASSIUM 100 MG/1
100 TABLET ORAL
Qty: 90 TAB | Refills: 3 | Status: SHIPPED | OUTPATIENT
Start: 2019-11-27 | End: 2020-12-14 | Stop reason: SDUPTHER

## 2019-11-27 SDOH — HEALTH STABILITY: MENTAL HEALTH: HOW OFTEN DO YOU HAVE 6 OR MORE DRINKS ON ONE OCCASION?: NEVER

## 2019-11-27 SDOH — HEALTH STABILITY: MENTAL HEALTH: HOW MANY STANDARD DRINKS CONTAINING ALCOHOL DO YOU HAVE ON A TYPICAL DAY?: 1 OR 2

## 2019-11-27 SDOH — HEALTH STABILITY: MENTAL HEALTH: HOW OFTEN DO YOU HAVE A DRINK CONTAINING ALCOHOL?: MONTHLY OR LESS

## 2019-11-27 ASSESSMENT — PATIENT HEALTH QUESTIONNAIRE - PHQ9: CLINICAL INTERPRETATION OF PHQ2 SCORE: 0

## 2019-11-27 NOTE — ASSESSMENT & PLAN NOTE
Chronic and ongoing problem. I think we need to monitor a bit closer due to some recent elevations in her BP. I recommend she start checking once daily at varying times of the day and write down the numbers so we can see where is at on average. I want her to be < 140/80. If she continues to run high then we may need to add a second agent. She will keep me updated on Mychart with her readings.

## 2019-11-27 NOTE — ASSESSMENT & PLAN NOTE
Chronic and stable problem. Continue levothyroxine 50 mcg daily. Recent thyroid labs in the normal range in August 2019. Follow up with endocrinology as recommended.

## 2019-11-27 NOTE — PATIENT INSTRUCTIONS
1. Buy a blood pressure cuff for the upper arm and check your blood pressure once daily at different times of the day and write them down for me. Can start with your wrist cuff but if you are getting highly variable numbers then consider getting the arm cuff.

## 2019-11-27 NOTE — LETTER
[employer]  Fariha Gold D.O.  [address]  [place] [address]  Fax: [phone]   Authorization for Release/Disclosure of   Protected Health Information   Name: NELI STOREY : 1945 SSN: [id number]   Address: [address] [place] [address] [place]  [facility] [address] Phone:    [phone] (home)    I authorize the entity listed below to release/disclose the PHI below to:   [employer]/Fariha Gold D.O. and Fariha Gold D.O.   Provider or Entity Name:  [facility]   Address   City, State, Zip   Phone:      Fax:     Reason for request: continuity of care   Information to be released:    [x] LAST COLONOSCOPY,  including any PATH REPORT and follow-up  [  ] LAST FIT/COLOGUARD RESULT [  ] LAST DEXA  [  ] LAST MAMMOGRAM  [  ] LAST PAP  [  ] LAST LABS [  ] RETINA EXAM REPORT  [  ] IMMUNIZATION RECORDS  [  ] Release all info      [  ] Check here and initial the line next to each item to release ALL health information INCLUDING  _____ Care and treatment for drug and / or alcohol abuse  _____ HIV testing, infection status, or AIDS  _____ Genetic Testing    DATES OF SERVICE OR TIME PERIOD TO BE DISCLOSED: _____________  I understand and acknowledge that:  * This Authorization may be revoked at any time by you in writing, except if your health information has already been used or disclosed.  * Your health information that will be used or disclosed as a result of you signing this authorization could be re-disclosed by the recipient. If this occurs, your re-disclosed health information may no longer be protected by State or Federal laws.  * You may refuse to sign this Authorization. Your refusal will not affect your ability to obtain treatment.  * This Authorization becomes effective upon signing and will  on (date) __________.      If no date is indicated, this Authorization will  one (1) year from the signature date.    Name: Neli Storey    Signature:   Date:      11/27/2019       PLEASE FAX REQUESTED RECORDS BACK TO: [phone]

## 2019-11-27 NOTE — ASSESSMENT & PLAN NOTE
Chronic and decompensated problem. We will start by rechecking her lipid panel as she has never had this high of a triglyceride level in the past and it may be an outlier. We will request past lab records from [facility] to confirm this. Continue lovastatin for now but if she remains elevated we may need to consider switching to atorvastatin and adding a fenofibrate. She is agreeable.

## 2019-12-02 NOTE — ASSESSMENT & PLAN NOTE
Chronic problem that requires additional evaluation. Obtain updated bone density as described above and continue calcium and vitamin D in the meantime.

## 2019-12-02 NOTE — ASSESSMENT & PLAN NOTE
Chronic problem that requires additional evaluation. Update bone density to determine if she is still osteopenic and appropriate for only calcium and vitamin D or if she now meets criteria for osteoporosis necessitating additional pharmacotherapy. Kidney function and calcium level appropriate for use of bisphosphonate therapy if needed.

## 2019-12-02 NOTE — ASSESSMENT & PLAN NOTE
Chronic and stable problem. Continue vitamin D 2,000 IU daily to help maintained bone supplementation and treat osteoporosis.

## 2019-12-02 NOTE — ASSESSMENT & PLAN NOTE
New problem of mild severity. We will continue to monitor her A1c annually to ensure she does not progress to DM2. Next A1c due in August 2020. We will follow up on her triglycerides in the near future with plans to add additional medical treatment of they continue to remain elevated and this would likely help with her elevated A1c.

## 2020-02-11 ENCOUNTER — HOSPITAL ENCOUNTER (OUTPATIENT)
Dept: RADIOLOGY | Facility: MEDICAL CENTER | Age: 75
End: 2020-02-11
Attending: INTERNAL MEDICINE
Payer: MEDICARE

## 2020-02-11 ENCOUNTER — HOSPITAL ENCOUNTER (OUTPATIENT)
Dept: LAB | Facility: MEDICAL CENTER | Age: 75
End: 2020-02-11
Attending: INTERNAL MEDICINE
Payer: MEDICARE

## 2020-02-11 DIAGNOSIS — Z78.0 MENOPAUSE: ICD-10-CM

## 2020-02-11 DIAGNOSIS — E78.5 HYPERLIPIDEMIA, UNSPECIFIED HYPERLIPIDEMIA TYPE: ICD-10-CM

## 2020-02-11 PROCEDURE — 77080 DXA BONE DENSITY AXIAL: CPT

## 2020-02-11 PROCEDURE — 36415 COLL VENOUS BLD VENIPUNCTURE: CPT

## 2020-02-11 PROCEDURE — 80061 LIPID PANEL: CPT

## 2020-02-12 LAB
CHOLEST SERPL-MCNC: 139 MG/DL (ref 100–199)
HDLC SERPL-MCNC: 33 MG/DL
LDLC SERPL CALC-MCNC: 68 MG/DL
TRIGL SERPL-MCNC: 191 MG/DL (ref 0–149)

## 2020-03-05 NOTE — RESULT ENCOUNTER NOTE
Mario Quinteros,    Your cholesterol panel looks much better, I suspect that high triglyceride level was an outlier.  No recommendations to change in your lovastatin at this time.  Let me know if you have any follow-up questions for me.    Thanks,  Dr. Gold

## 2020-03-05 NOTE — RESULT ENCOUNTER NOTE
Mario Quinteros,    Your bone density results have returned.  Your bones are very stable, which is great!  The calcium and vitamin D appear to be doing their job and you have had essentially no progression of your osteopenia since the last bone density.  Continue your current supplements and we can talk about rechecking in another couple of years.  Let me know if you have any follow-up questions for me.    Thanks,  Dr. Gold.

## 2020-04-28 ENCOUNTER — TELEPHONE (OUTPATIENT)
Dept: MEDICAL GROUP | Facility: MEDICAL CENTER | Age: 75
End: 2020-04-28

## 2020-04-28 NOTE — TELEPHONE ENCOUNTER
1. Caller Name: Neli Hernandez                          Call Back Number: [phone] (home)       How would the patient prefer to be contacted with a response: Phone call OK to leave a detailed message    Please order labs for her. We changed her appt day/time to June

## 2020-04-29 DIAGNOSIS — E03.9 ACQUIRED HYPOTHYROIDISM: ICD-10-CM

## 2020-04-29 DIAGNOSIS — I10 BENIGN HYPERTENSION: ICD-10-CM

## 2020-04-29 DIAGNOSIS — E78.2 MIXED HYPERLIPIDEMIA: ICD-10-CM

## 2020-04-29 DIAGNOSIS — E55.9 VITAMIN D DEFICIENCY: ICD-10-CM

## 2020-05-27 ENCOUNTER — PATIENT OUTREACH (OUTPATIENT)
Dept: HEALTH INFORMATION MANAGEMENT | Facility: OTHER | Age: 75
End: 2020-05-27

## 2020-06-05 ENCOUNTER — HOSPITAL ENCOUNTER (OUTPATIENT)
Dept: LAB | Facility: MEDICAL CENTER | Age: 75
End: 2020-06-05
Attending: INTERNAL MEDICINE
Payer: MEDICARE

## 2020-06-05 DIAGNOSIS — I10 BENIGN HYPERTENSION: ICD-10-CM

## 2020-06-05 DIAGNOSIS — E55.9 VITAMIN D DEFICIENCY: ICD-10-CM

## 2020-06-05 DIAGNOSIS — E03.9 ACQUIRED HYPOTHYROIDISM: ICD-10-CM

## 2020-06-05 DIAGNOSIS — E78.2 MIXED HYPERLIPIDEMIA: ICD-10-CM

## 2020-06-05 LAB
25(OH)D3 SERPL-MCNC: 57 NG/ML (ref 30–100)
ALBUMIN SERPL BCP-MCNC: 4.5 G/DL (ref 3.2–4.9)
ALBUMIN/GLOB SERPL: 1.5 G/DL
ALP SERPL-CCNC: 97 U/L (ref 30–99)
ALT SERPL-CCNC: 22 U/L (ref 2–50)
ANION GAP SERPL CALC-SCNC: 12 MMOL/L (ref 7–16)
AST SERPL-CCNC: 18 U/L (ref 12–45)
BASOPHILS # BLD AUTO: 1.1 % (ref 0–1.8)
BASOPHILS # BLD: 0.07 K/UL (ref 0–0.12)
BILIRUB SERPL-MCNC: 0.4 MG/DL (ref 0.1–1.5)
BUN SERPL-MCNC: 19 MG/DL (ref 8–22)
CALCIUM SERPL-MCNC: 9.7 MG/DL (ref 8.5–10.5)
CHLORIDE SERPL-SCNC: 102 MMOL/L (ref 96–112)
CHOLEST SERPL-MCNC: 150 MG/DL (ref 100–199)
CO2 SERPL-SCNC: 25 MMOL/L (ref 20–33)
CREAT SERPL-MCNC: 0.64 MG/DL (ref 0.5–1.4)
EOSINOPHIL # BLD AUTO: 0.22 K/UL (ref 0–0.51)
EOSINOPHIL NFR BLD: 3.3 % (ref 0–6.9)
ERYTHROCYTE [DISTWIDTH] IN BLOOD BY AUTOMATED COUNT: 42.9 FL (ref 35.9–50)
GLOBULIN SER CALC-MCNC: 3 G/DL (ref 1.9–3.5)
GLUCOSE SERPL-MCNC: 109 MG/DL (ref 65–99)
HCT VFR BLD AUTO: 46.2 % (ref 37–47)
HDLC SERPL-MCNC: 35 MG/DL
HGB BLD-MCNC: 15.1 G/DL (ref 12–16)
IMM GRANULOCYTES # BLD AUTO: 0.02 K/UL (ref 0–0.11)
IMM GRANULOCYTES NFR BLD AUTO: 0.3 % (ref 0–0.9)
LDLC SERPL CALC-MCNC: 70 MG/DL
LYMPHOCYTES # BLD AUTO: 1.91 K/UL (ref 1–4.8)
LYMPHOCYTES NFR BLD: 29 % (ref 22–41)
MCH RBC QN AUTO: 30.5 PG (ref 27–33)
MCHC RBC AUTO-ENTMCNC: 32.7 G/DL (ref 33.6–35)
MCV RBC AUTO: 93.3 FL (ref 81.4–97.8)
MONOCYTES # BLD AUTO: 0.54 K/UL (ref 0–0.85)
MONOCYTES NFR BLD AUTO: 8.2 % (ref 0–13.4)
NEUTROPHILS # BLD AUTO: 3.83 K/UL (ref 2–7.15)
NEUTROPHILS NFR BLD: 58.1 % (ref 44–72)
NRBC # BLD AUTO: 0 K/UL
NRBC BLD-RTO: 0 /100 WBC
PLATELET # BLD AUTO: 254 K/UL (ref 164–446)
PMV BLD AUTO: 10.7 FL (ref 9–12.9)
POTASSIUM SERPL-SCNC: 4.3 MMOL/L (ref 3.6–5.5)
PROT SERPL-MCNC: 7.5 G/DL (ref 6–8.2)
RBC # BLD AUTO: 4.95 M/UL (ref 4.2–5.4)
SODIUM SERPL-SCNC: 139 MMOL/L (ref 135–145)
TRIGL SERPL-MCNC: 226 MG/DL (ref 0–149)
TSH SERPL DL<=0.005 MIU/L-ACNC: 1.86 UIU/ML (ref 0.38–5.33)
WBC # BLD AUTO: 6.6 K/UL (ref 4.8–10.8)

## 2020-06-05 PROCEDURE — 36415 COLL VENOUS BLD VENIPUNCTURE: CPT

## 2020-06-05 PROCEDURE — 80061 LIPID PANEL: CPT

## 2020-06-05 PROCEDURE — 85025 COMPLETE CBC W/AUTO DIFF WBC: CPT

## 2020-06-05 PROCEDURE — 80053 COMPREHEN METABOLIC PANEL: CPT

## 2020-06-05 PROCEDURE — 82306 VITAMIN D 25 HYDROXY: CPT

## 2020-06-05 PROCEDURE — 84443 ASSAY THYROID STIM HORMONE: CPT

## 2020-06-10 ENCOUNTER — OFFICE VISIT (OUTPATIENT)
Dept: MEDICAL GROUP | Facility: MEDICAL CENTER | Age: 75
End: 2020-06-10
Payer: MEDICARE

## 2020-06-10 VITALS
OXYGEN SATURATION: 96 % | RESPIRATION RATE: 12 BRPM | BODY MASS INDEX: 21.34 KG/M2 | HEART RATE: 68 BPM | DIASTOLIC BLOOD PRESSURE: 80 MMHG | WEIGHT: 115.96 LBS | SYSTOLIC BLOOD PRESSURE: 142 MMHG | HEIGHT: 62 IN | TEMPERATURE: 98.1 F

## 2020-06-10 DIAGNOSIS — I10 BENIGN HYPERTENSION: ICD-10-CM

## 2020-06-10 DIAGNOSIS — E03.9 ACQUIRED HYPOTHYROIDISM: ICD-10-CM

## 2020-06-10 DIAGNOSIS — E78.2 MIXED HYPERLIPIDEMIA: ICD-10-CM

## 2020-06-10 DIAGNOSIS — M85.89 OSTEOPENIA OF MULTIPLE SITES: ICD-10-CM

## 2020-06-10 PROCEDURE — 99214 OFFICE O/P EST MOD 30 MIN: CPT | Performed by: INTERNAL MEDICINE

## 2020-06-10 ASSESSMENT — FIBROSIS 4 INDEX: FIB4 SCORE: 1.1

## 2020-06-10 ASSESSMENT — PATIENT HEALTH QUESTIONNAIRE - PHQ9: CLINICAL INTERPRETATION OF PHQ2 SCORE: 0

## 2020-06-10 NOTE — PATIENT INSTRUCTIONS
When you receive Shingrix vaccine you get 2 shots, 2-6 months apart, must have the 2nd shot before that 6 months expires.

## 2020-06-10 NOTE — ASSESSMENT & PLAN NOTE
Chronic and stable problem.  Continue losartan 100 mg daily.  Electrolytes and kidney function remained stable.

## 2020-06-10 NOTE — ASSESSMENT & PLAN NOTE
Chronic and ongoing problem.  She continues to struggle with elevated triglycerides.  Appropriate to continue on lovastatin 40 mg daily.

## 2020-06-10 NOTE — ASSESSMENT & PLAN NOTE
Chronic and stable problem.  Recent thyroid labs are in the normal range.  Appropriate to continue levothyroxine 50 mcg daily.

## 2020-06-10 NOTE — PROGRESS NOTES
Subjective:   Chief Complaint/History of Present Illness:  Neli Hernandez is a 74 y.o. female established patient who presents today to discuss medical problems as listed below. She is unaccompanied for today's visit.    Problem   Benign Hypertension       Ref. Range 6/5/2020 10:08   Sodium Latest Ref Range: 135 - 145 mmol/L 139   Potassium Latest Ref Range: 3.6 - 5.5 mmol/L 4.3   Bun Latest Ref Range: 8 - 22 mg/dL 19   Creatinine Latest Ref Range: 0.50 - 1.40 mg/dL 0.64   GFR If Non  Latest Ref Range: >60 mL/min/1.73 m 2 >60       She has a history of hypertension since her late 50's. She has been on losartan 100 mg since that time. She denies using any other agents. Her blood pressure tends to run higher when she is at the doctor due to white coat hypertension. Her heart rate at home tends to run in the 60's and she has a wrist BP cuff but does not usually check it. No headaches, chest pain, or dyspnea on exertion.     Osteopenia of Multiple Sites    Bone Density (2/2020):  lumbar spine T score of -1.7   proximal left femur T score of -1.6      When compared with the most recent study dated 10/29/2012, there has been a 0.2% decrease in the bone mineral density of the lumbar spine and a 1.0% decrease in the bone mineral density of the left femur.     10-year Probability of Fracture:  Major Osteoporotic     10.3%  Hip     1.8%    Osteopenia noted back in 2012, she is on calcium and vitamin D since that time. She denies prior history of fragility fractures.      Acquired Hypothyroidism       Ref. Range 6/5/2020 10:08   TSH Latest Ref Range: 0.380 - 5.330 uIU/mL 1.860       Maintained on levothyroxine 50 mcg for many years, well controlled. She follows with endocrinology, Dr. Win. She denies any fluctuations or changes to her dose for quite some time. She denies any signs or symptoms of over treatment or under treatment at this time.     Hyperlipidemia       Ref. Range 2/11/2020 09:55 6/5/2020  "10:08   Cholesterol,Tot Latest Ref Range: 100 - 199 mg/dL 139 150   Triglycerides Latest Ref Range: 0 - 149 mg/dL 191 (H) 226 (H)   HDL Latest Ref Range: >=40 mg/dL 33 (A) 35 (A)   LDL Latest Ref Range: <100 mg/dL 68 70       She reports using lovastatin since her 60's. It was started due to elevated cholesterol values, she denies any history of cardiovascular or cerebrovascular disease. She denies using any other medications for cholesterol in the past. She reports that her triglycerides have never been as high as they are now. No history of pancreatitis. She has never been on fenofibrate medications.  He has worked hard on weight loss and is down 13 pounds since our last visit in November 2019.  Walks frequently.          Additional History:   Allergies:    Penicillins     Current Medications:     Current Outpatient Medications   Medication Sig Dispense Refill   • levothyroxine (LEVO-T) 50 MCG Tab Take 1 Tab by mouth Every morning on an empty stomach. 114 Tab 3   • losartan (COZAAR) 100 MG Tab Take 1 Tab by mouth every day. 90 Tab 3   • lovastatin (MEVACOR) 40 MG tablet Take 1 Tab by mouth every day. 90 Tab 3   • Magnesium 250 MG Tab      • Cholecalciferol (VITAMIN D3) 2000 UNIT CAPS Take 1 Cap by mouth every day.     • FLUZONE HIGH-DOSE 0.5 ML Suspension Prefilled Syringe injection TO BE ADMINISTERED BY PHARMACIST FOR IMMUNIZATION  0   • Zoster Vac Recomb Adjuvanted (SHINGRIX) 50 MCG/0.5ML Recon Susp inject 0.5 milliliter by intramuscular route now & repeat same in 2-6 months.       No current facility-administered medications for this visit.         Social History:     Social History     Tobacco Use   • Smoking status: Never Smoker   • Smokeless tobacco: Never Used   • Tobacco comment: continued abstinence   Substance Use Topics   • Alcohol use: Yes     Alcohol/week: 0.0 - 0.6 oz     Frequency: Monthly or less     Drinks per session: 1 or 2     Binge frequency: Never     Comment: \"maybe twice a year\"   • Drug " "use: No       ROS: As above in the HPI      Objective:   Physical Exam:    Vitals: /80 (BP Location: Left arm, Patient Position: Sitting, BP Cuff Size: Adult)   Pulse 68   Temp 36.7 °C (98.1 °F) (Temporal)   Resp 12   Ht 1.562 m (5' 1.5\")   Wt 52.6 kg (115 lb 15.4 oz)   SpO2 96%    BMI: Body mass index is 21.56 kg/m².   General/Constitutional: Vitals as above, Well nourished, well developed female in no acute distress   Head/Eyes: Head is grossly normal & atraumatic, bilateral conjunctivae clear and not injected, bilateral EOMI, bilateral PERRLA   ENT: Bilateral external ears grossly normal in appearance, Hearing grossly intact, External nares normal in appearance and without discharge/bleeding   Respiratory: No respiratory distress, bilateral lungs are clear to ausculation in all lung fields, no wheezing/rhonchi/rales   Cardiovascular: Regular rate and rhythm without murmur/rubs, distal pulses are intact and equal bilaterally (radial), no bilateral lower extremity edema   MSK: Gait grossly normal & not antalgic   Integumentary: No apparent rashes on skin exposed areas   Psych: Judgment grossly appropriate, no apparent depression/anxiety    Health Maintenance:     - Completed      Assessment and Plan:     Problem List Items Addressed This Visit     Hyperlipidemia     Chronic and ongoing problem.  She continues to struggle with elevated triglycerides.  Appropriate to continue on lovastatin 40 mg daily.         Acquired hypothyroidism     Chronic and stable problem.  Recent thyroid labs are in the normal range.  Appropriate to continue levothyroxine 50 mcg daily.         Benign hypertension     Chronic and stable problem.  Continue losartan 100 mg daily.  Electrolytes and kidney function remained stable.         Osteopenia of multiple sites     Chronic and stable problem.  Her osteopenia has stayed quite stable over the past 8 years.  Recommend she continue on calcium and vitamin D.  Will recheck bone " density in 2 years.                RTC: 6 months.    PLEASE NOTE: This dictation was created using voice recognition software. I have made every reasonable attempt to correct obvious errors, but I expect that there are errors of grammar and possibly content that I did not discover before finalizing the note.

## 2020-06-10 NOTE — ASSESSMENT & PLAN NOTE
Chronic and stable problem.  Her osteopenia has stayed quite stable over the past 8 years.  Recommend she continue on calcium and vitamin D.  Will recheck bone density in 2 years.

## 2020-06-17 ENCOUNTER — NON-PROVIDER VISIT (OUTPATIENT)
Dept: MEDICAL GROUP | Facility: MEDICAL CENTER | Age: 75
End: 2020-06-17
Payer: MEDICARE

## 2020-06-17 DIAGNOSIS — Z23 NEED FOR VACCINATION: ICD-10-CM

## 2020-06-17 DIAGNOSIS — Z23 NEED FOR SHINGLES VACCINE: ICD-10-CM

## 2020-06-17 PROCEDURE — 90750 HZV VACC RECOMBINANT IM: CPT | Performed by: INTERNAL MEDICINE

## 2020-06-17 PROCEDURE — 90471 IMMUNIZATION ADMIN: CPT | Performed by: INTERNAL MEDICINE

## 2020-08-17 ENCOUNTER — NON-PROVIDER VISIT (OUTPATIENT)
Dept: MEDICAL GROUP | Facility: MEDICAL CENTER | Age: 75
End: 2020-08-17
Payer: MEDICARE

## 2020-08-17 DIAGNOSIS — Z23 NEED FOR VACCINATION: ICD-10-CM

## 2020-08-17 PROCEDURE — 90750 HZV VACC RECOMBINANT IM: CPT | Performed by: INTERNAL MEDICINE

## 2020-08-17 PROCEDURE — 90471 IMMUNIZATION ADMIN: CPT | Performed by: INTERNAL MEDICINE

## 2020-08-17 NOTE — NON-PROVIDER
"Neli Hernandez is a 74 y.o. female here for a non-provider visit for:   SHINGRIX (Shingles)    Reason for immunization: continue or complete series started at the office  Immunization records indicate need for vaccine: Yes, confirmed with Epic  Minimum interval has been met for this vaccine: Yes  ABN completed: Not Indicated    Order and dose verified by: JA  VIS Dated  NA was given to patient: Yes  All IAC Questionnaire questions were answered \"No.\"    Patient tolerated injection and no adverse effects were observed or reported: Yes    Pt scheduled for next dose in series: Not Indicated    Willie Almendarez, Med Ass't      "

## 2020-09-15 ENCOUNTER — TELEPHONE (OUTPATIENT)
Dept: MEDICAL GROUP | Facility: MEDICAL CENTER | Age: 75
End: 2020-09-15

## 2020-09-15 ENCOUNTER — PATIENT OUTREACH (OUTPATIENT)
Dept: HEALTH INFORMATION MANAGEMENT | Facility: OTHER | Age: 75
End: 2020-09-15

## 2020-09-15 DIAGNOSIS — R73.03 PREDIABETES: ICD-10-CM

## 2020-09-15 DIAGNOSIS — E03.9 ACQUIRED HYPOTHYROIDISM: ICD-10-CM

## 2020-09-15 DIAGNOSIS — E78.2 MIXED HYPERLIPIDEMIA: ICD-10-CM

## 2020-09-15 NOTE — TELEPHONE ENCOUNTER
Good afternoon Dr. Gold,    Pt called and stated that will see you in Dec, and would like to know if she needs to complete labs prior of her appt. Please advice.    Thank you.

## 2020-09-15 NOTE — TELEPHONE ENCOUNTER
Orders are in the system for her labs, some will be fasting. She can get them done a few days before our appointment. Thanks, DARRIUS

## 2020-10-01 ENCOUNTER — HOSPITAL ENCOUNTER (OUTPATIENT)
Dept: RADIOLOGY | Facility: MEDICAL CENTER | Age: 75
End: 2020-10-01
Attending: INTERNAL MEDICINE
Payer: MEDICARE

## 2020-10-01 DIAGNOSIS — Z12.31 VISIT FOR SCREENING MAMMOGRAM: ICD-10-CM

## 2020-10-01 PROCEDURE — 77067 SCR MAMMO BI INCL CAD: CPT

## 2020-10-06 NOTE — RESULT ENCOUNTER NOTE
Mario Quinteros,    Your mammogram results have returned. There are no concerning findings at this time and the radiologist recommends rechecking in 1 year or sooner if you develop breast pain, lumps, nipple discharge, etc.    Thanks,  Dr. Gold

## 2020-12-07 ENCOUNTER — HOSPITAL ENCOUNTER (OUTPATIENT)
Dept: LAB | Facility: MEDICAL CENTER | Age: 75
End: 2020-12-07
Attending: INTERNAL MEDICINE
Payer: MEDICARE

## 2020-12-07 DIAGNOSIS — R73.03 PREDIABETES: ICD-10-CM

## 2020-12-07 DIAGNOSIS — E03.9 ACQUIRED HYPOTHYROIDISM: ICD-10-CM

## 2020-12-07 DIAGNOSIS — E78.2 MIXED HYPERLIPIDEMIA: ICD-10-CM

## 2020-12-07 LAB
ALBUMIN SERPL BCP-MCNC: 4.4 G/DL (ref 3.2–4.9)
ALBUMIN/GLOB SERPL: 1.5 G/DL
ALP SERPL-CCNC: 96 U/L (ref 30–99)
ALT SERPL-CCNC: 31 U/L (ref 2–50)
ANION GAP SERPL CALC-SCNC: 7 MMOL/L (ref 7–16)
AST SERPL-CCNC: 21 U/L (ref 12–45)
BASOPHILS # BLD AUTO: 1 % (ref 0–1.8)
BASOPHILS # BLD: 0.08 K/UL (ref 0–0.12)
BILIRUB SERPL-MCNC: 0.5 MG/DL (ref 0.1–1.5)
BUN SERPL-MCNC: 14 MG/DL (ref 8–22)
CALCIUM SERPL-MCNC: 10 MG/DL (ref 8.5–10.5)
CHLORIDE SERPL-SCNC: 102 MMOL/L (ref 96–112)
CO2 SERPL-SCNC: 26 MMOL/L (ref 20–33)
CREAT SERPL-MCNC: 0.62 MG/DL (ref 0.5–1.4)
EOSINOPHIL # BLD AUTO: 0.2 K/UL (ref 0–0.51)
EOSINOPHIL NFR BLD: 2.6 % (ref 0–6.9)
ERYTHROCYTE [DISTWIDTH] IN BLOOD BY AUTOMATED COUNT: 43 FL (ref 35.9–50)
EST. AVERAGE GLUCOSE BLD GHB EST-MCNC: 114 MG/DL
FASTING STATUS PATIENT QL REPORTED: NORMAL
GLOBULIN SER CALC-MCNC: 3 G/DL (ref 1.9–3.5)
GLUCOSE SERPL-MCNC: 93 MG/DL (ref 65–99)
HBA1C MFR BLD: 5.6 % (ref 0–5.6)
HCT VFR BLD AUTO: 46.4 % (ref 37–47)
HGB BLD-MCNC: 15.5 G/DL (ref 12–16)
IMM GRANULOCYTES # BLD AUTO: 0.03 K/UL (ref 0–0.11)
IMM GRANULOCYTES NFR BLD AUTO: 0.4 % (ref 0–0.9)
LYMPHOCYTES # BLD AUTO: 2.2 K/UL (ref 1–4.8)
LYMPHOCYTES NFR BLD: 28.6 % (ref 22–41)
MCH RBC QN AUTO: 31.4 PG (ref 27–33)
MCHC RBC AUTO-ENTMCNC: 33.4 G/DL (ref 33.6–35)
MCV RBC AUTO: 93.9 FL (ref 81.4–97.8)
MONOCYTES # BLD AUTO: 0.6 K/UL (ref 0–0.85)
MONOCYTES NFR BLD AUTO: 7.8 % (ref 0–13.4)
NEUTROPHILS # BLD AUTO: 4.59 K/UL (ref 2–7.15)
NEUTROPHILS NFR BLD: 59.6 % (ref 44–72)
NRBC # BLD AUTO: 0 K/UL
NRBC BLD-RTO: 0 /100 WBC
PLATELET # BLD AUTO: 261 K/UL (ref 164–446)
PMV BLD AUTO: 10.6 FL (ref 9–12.9)
POTASSIUM SERPL-SCNC: 4.1 MMOL/L (ref 3.6–5.5)
PROT SERPL-MCNC: 7.4 G/DL (ref 6–8.2)
RBC # BLD AUTO: 4.94 M/UL (ref 4.2–5.4)
SODIUM SERPL-SCNC: 135 MMOL/L (ref 135–145)
TSH SERPL DL<=0.005 MIU/L-ACNC: 2.16 UIU/ML (ref 0.38–5.33)
WBC # BLD AUTO: 7.7 K/UL (ref 4.8–10.8)

## 2020-12-07 PROCEDURE — 83036 HEMOGLOBIN GLYCOSYLATED A1C: CPT

## 2020-12-07 PROCEDURE — 85025 COMPLETE CBC W/AUTO DIFF WBC: CPT

## 2020-12-07 PROCEDURE — 36415 COLL VENOUS BLD VENIPUNCTURE: CPT

## 2020-12-07 PROCEDURE — 84443 ASSAY THYROID STIM HORMONE: CPT

## 2020-12-07 PROCEDURE — 80053 COMPREHEN METABOLIC PANEL: CPT

## 2020-12-08 SDOH — ECONOMIC STABILITY: HOUSING INSECURITY: IN THE LAST 12 MONTHS, HOW MANY PLACES HAVE YOU LIVED?: 1

## 2020-12-08 SDOH — HEALTH STABILITY: PHYSICAL HEALTH: ON AVERAGE, HOW MANY DAYS PER WEEK DO YOU ENGAGE IN MODERATE TO STRENUOUS EXERCISE (LIKE A BRISK WALK)?: 6 DAYS

## 2020-12-08 SDOH — HEALTH STABILITY: PHYSICAL HEALTH: ON AVERAGE, HOW MANY MINUTES DO YOU ENGAGE IN EXERCISE AT THIS LEVEL?: 30 MINUTES

## 2020-12-08 SDOH — ECONOMIC STABILITY: INCOME INSECURITY: IN THE LAST 12 MONTHS, WAS THERE A TIME WHEN YOU WERE NOT ABLE TO PAY THE MORTGAGE OR RENT ON TIME?: NO

## 2020-12-08 SDOH — ECONOMIC STABILITY: HOUSING INSECURITY
IN THE LAST 12 MONTHS, WAS THERE A TIME WHEN YOU DID NOT HAVE A STEADY PLACE TO SLEEP OR SLEPT IN A SHELTER (INCLUDING NOW)?: NO

## 2020-12-08 SDOH — ECONOMIC STABILITY: TRANSPORTATION INSECURITY
IN THE PAST 12 MONTHS, HAS LACK OF RELIABLE TRANSPORTATION KEPT YOU FROM MEDICAL APPOINTMENTS, MEETINGS, WORK OR FROM GETTING THINGS NEEDED FOR DAILY LIVING?: NO

## 2020-12-08 SDOH — ECONOMIC STABILITY: TRANSPORTATION INSECURITY
IN THE PAST 12 MONTHS, HAS THE LACK OF TRANSPORTATION KEPT YOU FROM MEDICAL APPOINTMENTS OR FROM GETTING MEDICATIONS?: NO

## 2020-12-08 SDOH — HEALTH STABILITY: MENTAL HEALTH
STRESS IS WHEN SOMEONE FEELS TENSE, NERVOUS, ANXIOUS, OR CAN'T SLEEP AT NIGHT BECAUSE THEIR MIND IS TROUBLED. HOW STRESSED ARE YOU?: NOT AT ALL

## 2020-12-08 SDOH — ECONOMIC STABILITY: HOUSING INSECURITY

## 2020-12-08 ASSESSMENT — SOCIAL DETERMINANTS OF HEALTH (SDOH)
HOW OFTEN DO YOU ATTEND CHURCH OR RELIGIOUS SERVICES?: NEVER
HOW OFTEN DO YOU GET TOGETHER WITH FRIENDS OR RELATIVES?: DECLINE
IN A TYPICAL WEEK, HOW MANY TIMES DO YOU TALK ON THE PHONE WITH FAMILY, FRIENDS, OR NEIGHBORS?: MORE THAN THREE TIMES A WEEK
HOW MANY DRINKS CONTAINING ALCOHOL DO YOU HAVE ON A TYPICAL DAY WHEN YOU ARE DRINKING: 1 OR 2
HOW HARD IS IT FOR YOU TO PAY FOR THE VERY BASICS LIKE FOOD, HOUSING, MEDICAL CARE, AND HEATING?: NOT HARD AT ALL
WITHIN THE PAST 12 MONTHS, THE FOOD YOU BOUGHT JUST DIDN'T LAST AND YOU DIDN'T HAVE MONEY TO GET MORE: NEVER TRUE
HOW OFTEN DO YOU HAVE SIX OR MORE DRINKS ON ONE OCCASION: NEVER
HOW OFTEN DO YOU ATTENT MEETINGS OF THE CLUB OR ORGANIZATION YOU BELONG TO?: NEVER
HOW OFTEN DO YOU HAVE A DRINK CONTAINING ALCOHOL: MONTHLY OR LESS
WITHIN THE PAST 12 MONTHS, YOU WORRIED THAT YOUR FOOD WOULD RUN OUT BEFORE YOU GOT THE MONEY TO BUY MORE: NEVER TRUE

## 2020-12-14 ENCOUNTER — OFFICE VISIT (OUTPATIENT)
Dept: MEDICAL GROUP | Facility: MEDICAL CENTER | Age: 75
End: 2020-12-14
Payer: MEDICARE

## 2020-12-14 VITALS
DIASTOLIC BLOOD PRESSURE: 70 MMHG | BODY MASS INDEX: 21.9 KG/M2 | TEMPERATURE: 98.4 F | HEIGHT: 62 IN | OXYGEN SATURATION: 95 % | SYSTOLIC BLOOD PRESSURE: 122 MMHG | HEART RATE: 84 BPM | WEIGHT: 119 LBS

## 2020-12-14 DIAGNOSIS — R73.03 PREDIABETES: ICD-10-CM

## 2020-12-14 DIAGNOSIS — E78.5 HYPERLIPIDEMIA, UNSPECIFIED HYPERLIPIDEMIA TYPE: ICD-10-CM

## 2020-12-14 DIAGNOSIS — E03.9 ACQUIRED HYPOTHYROIDISM: ICD-10-CM

## 2020-12-14 DIAGNOSIS — M85.89 OSTEOPENIA OF MULTIPLE SITES: ICD-10-CM

## 2020-12-14 DIAGNOSIS — E78.2 MIXED HYPERLIPIDEMIA: ICD-10-CM

## 2020-12-14 DIAGNOSIS — E55.9 VITAMIN D DEFICIENCY: ICD-10-CM

## 2020-12-14 DIAGNOSIS — I10 BENIGN HYPERTENSION: ICD-10-CM

## 2020-12-14 DIAGNOSIS — Z00.00 ENCOUNTER FOR SUBSEQUENT ANNUAL WELLNESS VISIT (AWV) IN MEDICARE PATIENT: Primary | ICD-10-CM

## 2020-12-14 PROCEDURE — G0439 PPPS, SUBSEQ VISIT: HCPCS | Performed by: INTERNAL MEDICINE

## 2020-12-14 RX ORDER — LOVASTATIN 40 MG/1
40 TABLET ORAL DAILY
Qty: 100 TAB | Refills: 3 | Status: SHIPPED | OUTPATIENT
Start: 2020-12-14 | End: 2021-08-19 | Stop reason: SDUPTHER

## 2020-12-14 RX ORDER — LOSARTAN POTASSIUM 100 MG/1
100 TABLET ORAL
Qty: 100 TAB | Refills: 3 | Status: SHIPPED | OUTPATIENT
Start: 2020-12-14 | End: 2021-08-19 | Stop reason: SDUPTHER

## 2020-12-14 RX ORDER — LEVOTHYROXINE SODIUM 0.05 MG/1
50 TABLET ORAL
Qty: 100 TAB | Refills: 3 | Status: SHIPPED | OUTPATIENT
Start: 2020-12-14 | End: 2021-08-19 | Stop reason: SDUPTHER

## 2020-12-14 ASSESSMENT — ENCOUNTER SYMPTOMS: GENERAL WELL-BEING: EXCELLENT

## 2020-12-14 ASSESSMENT — PATIENT HEALTH QUESTIONNAIRE - PHQ9: CLINICAL INTERPRETATION OF PHQ2 SCORE: 0

## 2020-12-14 ASSESSMENT — FIBROSIS 4 INDEX: FIB4 SCORE: 1.07

## 2020-12-14 ASSESSMENT — ACTIVITIES OF DAILY LIVING (ADL): BATHING_REQUIRES_ASSISTANCE: 0

## 2020-12-14 NOTE — ASSESSMENT & PLAN NOTE
Chronic and stable problem.  She would like to have her labs reviewed by Dr. Win before picking up her next refill to ensure that he agrees with keeping her dose the same.  I think it be reasonable to continue levothyroxine 50 mcg daily and will forward him my note from today to make sure he is in agreeance.

## 2020-12-14 NOTE — PROGRESS NOTES
Chief Complaint   Patient presents with   • Annual Wellness Visit         HPI:  Neli is a 75 y.o. here for Medicare Annual Wellness Visit    Problem   Prediabetes       Ref. Range 8/15/2019 09:59 12/7/2020 10:36   Glycohemoglobin Latest Ref Range: 0.0 - 5.6 % 6.0 (H) 5.6   Estim. Avg Glu Latest Units: mg/dL 126 114       A1c 6.0 in mid August 2019. No prior A1c checks, she denies prior knowledge of pre-diabetes. She reports stable weight and has a normal BMI. She has had a decompensation of her triglycerides on recent analysis. No blurred vision, polyuria, or polydipsia.     Benign Hypertension       Ref. Range 12/7/2020 10:36   Sodium Latest Ref Range: 135 - 145 mmol/L 135   Potassium Latest Ref Range: 3.6 - 5.5 mmol/L 4.1   Chloride Latest Ref Range: 96 - 112 mmol/L 102   Bun Latest Ref Range: 8 - 22 mg/dL 14   Creatinine Latest Ref Range: 0.50 - 1.40 mg/dL 0.62   GFR If Non  Latest Ref Range: >60 mL/min/1.73 m 2 >60       She has a history of hypertension since her late 50's on monotherapy with Losartan.      Her blood pressure tends to run higher when she is at the doctor due to white coat hypertension. Her heart rate at home tends to run in the 60's and she has a wrist BP cuff but does not usually check it.     No headaches, chest pain, or dyspnea on exertion.    BP in clinic ranging 122-142/70-80.    Current regimen: losartan 100 mg daily     Osteopenia of Multiple Sites    Bone Density (2/2020):  lumbar spine T score of -1.7   proximal left femur T score of -1.6      When compared with the most recent study dated 10/29/2012, there has been a 0.2% decrease in the bone mineral density of the lumbar spine and a 1.0% decrease in the bone mineral density of the left femur.     10-year Probability of Fracture:  Major Osteoporotic     10.3%  Hip     1.8%    Osteopenia noted back in 2012, she is on calcium and vitamin D since that time. She denies prior history of fragility fractures.     Next  DEXA due in 2/2022.     Acquired Hypothyroidism       Ref. Range 6/5/2020 10:08 12/7/2020 10:36   TSH Latest Ref Range: 0.380 - 5.330 uIU/mL 1.860 2.160       Maintained on levothyroxine 50 mcg for many years, well controlled. She follows with endocrinology, Dr. Win. She denies any fluctuations or changes to her dose for quite some time. She denies any signs or symptoms of over treatment or under treatment at this time.     Hyperlipidemia       Ref. Range 2/11/2020 09:55 6/5/2020 10:08   Cholesterol,Tot Latest Ref Range: 100 - 199 mg/dL 139 150   Triglycerides Latest Ref Range: 0 - 149 mg/dL 191 (H) 226 (H)   HDL Latest Ref Range: >=40 mg/dL 33 (A) 35 (A)   LDL Latest Ref Range: <100 mg/dL 68 70       She reports using lovastatin since her 60's. It was started due to elevated cholesterol values, she denies any history of cardiovascular or cerebrovascular disease.     She reports that her triglycerides have never been as high as they are now. No history of pancreatitis. She has never been on fenofibrate medications.         Vitamin D Deficiency       Ref. Range 6/5/2020 10:08   25-Hydroxy   Vitamin D 25 Latest Ref Range: 30 - 100 ng/mL 57       Noted in the past and she has remained on vitamin D replacement at 2,000 international units daily. She also has a history of osteoporosis.         Patient Active Problem List    Diagnosis Date Noted   • Menopause 11/27/2019   • Prediabetes 07/15/2019   • Benign hypertension 07/20/2017   • Osteopenia of multiple sites 11/23/2015   • Cataract 08/31/2015   • Acquired hypothyroidism 04/06/2015   • Hyperlipidemia 01/23/2014   • H/O partial thyroidectomy 01/23/2014   • Vitamin D deficiency 04/11/2011       Current Outpatient Medications   Medication Sig Dispense Refill   • losartan (COZAAR) 100 MG Tab Take 1 Tab by mouth every day. 100 Tab 3   • levothyroxine (LEVO-T) 50 MCG Tab Take 1 Tab by mouth Every morning on an empty stomach. 100 Tab 3   • lovastatin (MEVACOR) 40 MG  tablet Take 1 Tab by mouth every day. 100 Tab 3   • Magnesium 250 MG Tab      • Cholecalciferol (VITAMIN D3) 2000 UNIT CAPS Take 1 Cap by mouth every day.       No current facility-administered medications for this visit.         Patient is taking medications as noted in medication list.  Current supplements as per medication list.     Allergies: Penicillins    Current social contact/activities: Pt reports reading, listening to audio books, play games on computer.     Is patient current with immunizations? Yes.    She  reports that she has never smoked. She has never used smokeless tobacco. She reports current alcohol use. She reports that she does not use drugs.  Counseling given: Not Answered  Comment: continued abstinence        DPA/Advanced directive: Patient has Advanced Directive, but it is not on file. Instructed to bring in a copy to scan into their chart.    ROS:    Gait: Uses no assistive device   Ostomy: No   Other tubes: No   Amputations: No   Chronic oxygen use No   Last eye exam Pt reports about a month ago   Wears hearing aids: No   : Denies any urinary leakage during the last 6 months      Depression Screening    Little interest or pleasure in doing things?  0 - not at all  Feeling down, depressed, or hopeless? 0 - not at all  Patient Health Questionnaire Score: 0    If depressive symptoms identified deferred to follow up visit unless specifically addressed in assessment and plan.    Interpretation of PHQ-9 Total Score   Score Severity   1-4 No Depression   5-9 Mild Depression   10-14 Moderate Depression   15-19 Moderately Severe Depression   20-27 Severe Depression    Screening for Cognitive Impairment    Three Minute Recall (river, victorino, finger)  3/3    Eric clock face with all 12 numbers and set the hands to show 10 past 11.  Yes 5/5  If cognitive concerns identified, deferred for follow up unless specifically addressed in assessment and plan.    Fall Risk Assessment    Has the patient had two  or more falls in the last year or any fall with injury in the last year?  No  If fall risk identified, deferred for follow up unless specifically addressed in assessment and plan.    Safety Assessment    Throw rugs on floor.  No  Handrails on all stairs.  Yes  Good lighting in all hallways.  Yes  Difficulty hearing.  No  Patient counseled about all safety risks that were identified.    Functional Assessment ADLs    Are there any barriers preventing you from cooking for yourself or meeting nutritional needs?  No.    Are there any barriers preventing you from driving safely or obtaining transportation?  No.    Are there any barriers preventing you from using a telephone or calling for help?  No.    Are there any barriers preventing you from shopping?  No.    Are there any barriers preventing you from taking care of your own finances?  No.    Are there any barriers preventing you from managing your medications?  No.    Are there any barriers preventing you from showering, bathing or dressing yourself?  No.    Are you currently engaging in any exercise or physical activity?  Yes.  Pt reports walking 5,000 steps daily on vibration board   What is your perception of your health?  Excellent.    Health Maintenance Summary                Annual Wellness Visit Overdue 1945     COLONOSCOPY Next Due 3/29/2021      Done 3/29/2016 REFERRAL TO GI FOR COLONOSCOPY    MAMMOGRAM Next Due 10/1/2021      Done 10/1/2020 MA-SCREENING MAMMO BILAT W/TOMOSYNTHESIS W/CAD     Patient has more history with this topic...    BONE DENSITY Next Due 2/11/2025      Done 2/11/2020 DS-BONE DENSITY STUDY (DEXA)     Patient has more history with this topic...    IMM DTaP/Tdap/Td Vaccine Next Due 3/27/2029      Done 3/27/2019 Imm Admin: Tdap Vaccine     Patient has more history with this topic...          Patient Care Team:  Fariha Gold D.O. as PCP - General (Internal Medicine)    Social History     Tobacco Use   • Smoking status: Never  "Smoker   • Smokeless tobacco: Never Used   • Tobacco comment: continued abstinence   Substance Use Topics   • Alcohol use: Yes     Alcohol/week: 0.0 - 0.6 oz     Frequency: Monthly or less     Drinks per session: 1 or 2     Binge frequency: Never     Comment: \"maybe twice a year\"   • Drug use: No     Family History   Problem Relation Age of Onset   • Cancer Maternal Aunt         breast   • Stroke Mother         TIA   • Heart Disease Father      She  has a past medical history of Atypical chest pain (1/23/2014), Cataract, Family history of coronary artery disease (1/23/2014), Hyperlipidemia, Hypertension, Hypothyroidism, postop, and Pain of both hip joints (1/20/2017). She also has no past medical history of Encounter for long-term (current) use of other medications.   Past Surgical History:   Procedure Laterality Date   • APPENDECTOMY     • LAMINOTOMY      cervical fusion; disc bulge?   • PRIMARY C SECTION      cervix repair and c section   • THYROIDECTOMY     • TONSILLECTOMY     • US-CYST ASPIRATION-BREAST INITIAL Left 1990's           Exam:     /70   Pulse 84   Temp 36.9 °C (98.4 °F) (Temporal)   Ht 1.575 m (5' 2\")   Wt 54 kg (119 lb)   SpO2 95%  Body mass index is 21.77 kg/m².    Hearing good.    Dentition good.  Alert, oriented in no acute distress.  Eye contact is good, speech goal directed, affect calm      Assessment and Plan. The following treatment and monitoring plan is recommended:    1. Encounter for subsequent annual wellness visit (AWV) in Medicare patient     2. Acquired hypothyroidism  levothyroxine (LEVO-T) 50 MCG Tab   3. Benign hypertension  losartan (COZAAR) 100 MG Tab   4. Mixed hyperlipidemia     5. Osteopenia of multiple sites     6. Prediabetes     7. Vitamin D deficiency     8. Hyperlipidemia, unspecified hyperlipidemia type  lovastatin (MEVACOR) 40 MG tablet     Problem List Items Addressed This Visit     Hyperlipidemia     Chronic and stable problem.  We will recheck her " cholesterol and triglyceride levels before next visit in 6 months to ensure improvement.  She has lost 16 pounds which for her frame has brought her BMI down to the 21 range.  Her prediabetes has resolved and I suspect her cholesterol will also be improved.         Relevant Medications    losartan (COZAAR) 100 MG Tab    lovastatin (MEVACOR) 40 MG tablet    Acquired hypothyroidism     Chronic and stable problem.  She would like to have her labs reviewed by Dr. Win before picking up her next refill to ensure that he agrees with keeping her dose the same.  I think it be reasonable to continue levothyroxine 50 mcg daily and will forward him my note from today to make sure he is in agreeance.         Relevant Medications    levothyroxine (LEVO-T) 50 MCG Tab    Benign hypertension     Chronic and stable problem.  Blood pressure is at goal, no signs or symptoms of orthostasis.  Lab work is appropriate.  Continue losartan 100 mg daily.         Relevant Medications    losartan (COZAAR) 100 MG Tab    lovastatin (MEVACOR) 40 MG tablet    Prediabetes     Previous problem, resolved.  With healthy dietary choices and weight loss she has been able to reverse her prediabetes.  We will continue to check on her A1c periodically.  No indication for pharmacotherapy at this time.         Vitamin D deficiency     Previous problem, resolved.  Vitamin D level sufficient, continue current replacement.         Osteopenia of multiple sites     Chronic and stable problem.  Continue with calcium and vitamin D.  Next bone density will be due in February 2022.           Other Visit Diagnoses     Encounter for subsequent annual wellness visit (AWV) in Medicare patient    -  Primary          Services suggested: No services needed at this time  Health Care Screening recommendations as per orders if indicated.  Referrals offered: PT/OT/Nutrition counseling/Behavioral Health/Smoking cessation as per orders if indicated.    Discussion today about  general wellness and lifestyle habits:    · Prevent falls and reduce trip hazards; Cautioned about securing or removing rugs.  · Have a working fire alarm and carbon monoxide detector;   · Engage in regular physical activity and social activities       Follow-up: Return in about 6 months (around 6/14/2021).

## 2020-12-14 NOTE — ASSESSMENT & PLAN NOTE
Chronic and stable problem.  We will recheck her cholesterol and triglyceride levels before next visit in 6 months to ensure improvement.  She has lost 16 pounds which for her frame has brought her BMI down to the 21 range.  Her prediabetes has resolved and I suspect her cholesterol will also be improved.

## 2020-12-14 NOTE — ASSESSMENT & PLAN NOTE
Previous problem, resolved.  With healthy dietary choices and weight loss she has been able to reverse her prediabetes.  We will continue to check on her A1c periodically.  No indication for pharmacotherapy at this time.

## 2020-12-14 NOTE — ASSESSMENT & PLAN NOTE
Chronic and stable problem.  Blood pressure is at goal, no signs or symptoms of orthostasis.  Lab work is appropriate.  Continue losartan 100 mg daily.

## 2020-12-14 NOTE — ASSESSMENT & PLAN NOTE
Chronic and stable problem.  Continue with calcium and vitamin D.  Next bone density will be due in February 2022.

## 2020-12-15 ENCOUNTER — TELEPHONE (OUTPATIENT)
Dept: ENDOCRINOLOGY | Facility: MEDICAL CENTER | Age: 75
End: 2020-12-15

## 2020-12-15 NOTE — TELEPHONE ENCOUNTER
Telephone conversation with patient.    Doing very well and exercising appropriate caution during the COVID-19 epidemic and surge.  She is home alone but does not seem to mind it.    Thyroid levels have been in good range and stable.  No changes indicated.  She is very pleased with Dr. Gold's care.  Neli asked me to call I think mainly to say hello and touch base more than anything.  Neli is satisfied with her thyroid status.    Jamal Win M.D.

## 2021-01-15 DIAGNOSIS — Z23 NEED FOR VACCINATION: ICD-10-CM

## 2021-01-23 ENCOUNTER — IMMUNIZATION (OUTPATIENT)
Dept: FAMILY PLANNING/WOMEN'S HEALTH CLINIC | Facility: IMMUNIZATION CENTER | Age: 76
End: 2021-01-23
Attending: INTERNAL MEDICINE
Payer: MEDICARE

## 2021-01-23 DIAGNOSIS — Z23 ENCOUNTER FOR VACCINATION: Primary | ICD-10-CM

## 2021-01-23 DIAGNOSIS — Z23 NEED FOR VACCINATION: ICD-10-CM

## 2021-01-23 PROCEDURE — 0001A PFIZER SARS-COV-2 VACCINE: CPT

## 2021-01-23 PROCEDURE — 91300 PFIZER SARS-COV-2 VACCINE: CPT

## 2021-02-13 ENCOUNTER — IMMUNIZATION (OUTPATIENT)
Dept: FAMILY PLANNING/WOMEN'S HEALTH CLINIC | Facility: IMMUNIZATION CENTER | Age: 76
End: 2021-02-13
Attending: INTERNAL MEDICINE
Payer: MEDICARE

## 2021-02-13 DIAGNOSIS — Z23 ENCOUNTER FOR VACCINATION: Primary | ICD-10-CM

## 2021-02-13 PROCEDURE — 0002A PFIZER SARS-COV-2 VACCINE: CPT

## 2021-02-13 PROCEDURE — 91300 PFIZER SARS-COV-2 VACCINE: CPT

## 2021-05-04 ENCOUNTER — PATIENT OUTREACH (OUTPATIENT)
Dept: HEALTH INFORMATION MANAGEMENT | Facility: OTHER | Age: 76
End: 2021-05-04

## 2021-05-04 NOTE — PROGRESS NOTES
Outcome: Pt called stating she received a phone call from [facility] but wants to confirm if it was not a scam. Asked pt if she can provide phone number and did not recall the phone number. Pt stated will call back with phone number.     Please transfer to Patient Outreach Team at [phone] when patient returns call.    [facility] Verified: yes    Attempt # 1

## 2021-06-16 ENCOUNTER — OFFICE VISIT (OUTPATIENT)
Dept: MEDICAL GROUP | Facility: PHYSICIAN GROUP | Age: 76
End: 2021-06-16
Payer: MEDICARE

## 2021-06-16 VITALS
BODY MASS INDEX: 21.55 KG/M2 | WEIGHT: 117.1 LBS | DIASTOLIC BLOOD PRESSURE: 78 MMHG | HEIGHT: 62 IN | OXYGEN SATURATION: 96 % | RESPIRATION RATE: 12 BRPM | HEART RATE: 73 BPM | SYSTOLIC BLOOD PRESSURE: 146 MMHG | TEMPERATURE: 97.8 F

## 2021-06-16 DIAGNOSIS — I10 BENIGN HYPERTENSION: ICD-10-CM

## 2021-06-16 DIAGNOSIS — R73.03 PREDIABETES: ICD-10-CM

## 2021-06-16 DIAGNOSIS — E55.9 VITAMIN D DEFICIENCY: ICD-10-CM

## 2021-06-16 DIAGNOSIS — E78.2 MIXED HYPERLIPIDEMIA: ICD-10-CM

## 2021-06-16 DIAGNOSIS — E03.9 ACQUIRED HYPOTHYROIDISM: ICD-10-CM

## 2021-06-16 PROCEDURE — 99214 OFFICE O/P EST MOD 30 MIN: CPT | Performed by: INTERNAL MEDICINE

## 2021-06-16 RX ORDER — OMEGA-3 FATTY ACIDS CAP DELAYED RELEASE 1000 MG 1000 MG
2000 CAPSULE DELAYED RELEASE ORAL
COMMUNITY
End: 2022-06-06

## 2021-06-16 ASSESSMENT — PATIENT HEALTH QUESTIONNAIRE - PHQ9: CLINICAL INTERPRETATION OF PHQ2 SCORE: 0

## 2021-06-16 ASSESSMENT — FIBROSIS 4 INDEX: FIB4 SCORE: 1.07

## 2021-06-16 NOTE — ASSESSMENT & PLAN NOTE
Chronic and ongoing problem.  Update thyroid function to ensure stability.  Continue levothyroxine 50 mcg daily.

## 2021-06-16 NOTE — ASSESSMENT & PLAN NOTE
Previous problem requires follow-up.  She has history of prediabetes.  She has been trying to be more active with exercise.  Will update A1c to ensure stability, no indication to start pharmacotherapy at this time.

## 2021-06-16 NOTE — ASSESSMENT & PLAN NOTE
Chronic and ongoing problem.  Update vitamin D level to ensure stability.  Continue vitamin D 2000 international units daily.

## 2021-06-16 NOTE — PROGRESS NOTES
Subjective:   Chief Complaint/History of Present Illness:  Neli Hernandez is a 75 y.o. female established patient who presents today to discuss medical problems as listed below. Neli is unaccompanied for today's visit.    Problem   Prediabetes       Ref. Range 8/15/2019 09:59 12/7/2020 10:36   Glycohemoglobin Latest Ref Range: 0.0 - 5.6 % 6.0 (H) 5.6   Estim. Avg Glu Latest Units: mg/dL 126 114       A1c 6.0 in mid August 2019. No prior A1c checks, she denies prior knowledge of pre-diabetes. She reports stable weight and has a normal BMI. She has had a decompensation of her triglycerides on recent analysis. No blurred vision, polyuria, or polydipsia.     Benign Hypertension    She has a history of hypertension since her late 50's on monotherapy with Losartan.      Her blood pressure tends to run higher when she is at the doctor due to white coat hypertension. Her heart rate at home tends to run in the 60's and she has a wrist BP cuff but does not usually check it.     No headaches, chest pain, or dyspnea on exertion.    BP in clinic ranging 122-148/70-72.    Current regimen: losartan 100 mg daily     Acquired Hypothyroidism       Ref. Range 6/5/2020 10:08 12/7/2020 10:36   TSH Latest Ref Range: 0.380 - 5.330 uIU/mL 1.860 2.160       Maintained on levothyroxine 50 mcg for many years, well controlled. She follows with endocrinology, Dr. Win. She denies any fluctuations or changes to her dose for quite some time. She denies any signs or symptoms of over treatment or under treatment at this time.     Hyperlipidemia       Ref. Range 2/11/2020 09:55 6/5/2020 10:08   Cholesterol,Tot Latest Ref Range: 100 - 199 mg/dL 139 150   Triglycerides Latest Ref Range: 0 - 149 mg/dL 191 (H) 226 (H)   HDL Latest Ref Range: >=40 mg/dL 33 (A) 35 (A)   LDL Latest Ref Range: <100 mg/dL 68 70       She reports using lovastatin since her 60's. It was started due to elevated cholesterol values, she denies any history of  "cardiovascular or cerebrovascular disease.     She reports that her triglycerides have never been as high as they are now. No history of pancreatitis. She has never been on fenofibrate medications.      Current regimen: Lovastatin 40 mg daily     Vitamin D Deficiency       Ref. Range 6/5/2020 10:08   25-Hydroxy   Vitamin D 25 Latest Ref Range: 30 - 100 ng/mL 57       Noted in the past and she has remained on vitamin D replacement at 2,000 international units daily. She also has a history of osteoporosis.          Current Medications:  Current Outpatient Medications Ordered in Epic   Medication Sig Dispense Refill   • Omega-3 Fatty Acids (FISH OIL) 1000 MG CAPSULE DELAYED RELEASE EC softgel capsule Take 2,000 mg by mouth every morning with breakfast.     • losartan (COZAAR) 100 MG Tab Take 1 Tab by mouth every day. 100 Tab 3   • levothyroxine (LEVO-T) 50 MCG Tab Take 1 Tab by mouth Every morning on an empty stomach. 100 Tab 3   • lovastatin (MEVACOR) 40 MG tablet Take 1 Tab by mouth every day. 100 Tab 3   • Magnesium 250 MG Tab      • Cholecalciferol (VITAMIN D3) 2000 UNIT CAPS Take 1 Cap by mouth every day.       No current [facility]-ordered facility-administered medications on file.          Objective:   Physical Exam:    Vitals: /78 (BP Location: Left arm, Patient Position: Sitting, BP Cuff Size: Adult)   Pulse 73   Temp 36.6 °C (97.8 °F) (Temporal)   Resp 12   Ht 1.562 m (5' 1.5\")   Wt 53.1 kg (117 lb 1.6 oz)   SpO2 96%    BMI: Body mass index is 21.77 kg/m².  Physical Exam  Constitutional:       General: She is not in acute distress.     Appearance: Normal appearance. She is normal weight. She is not ill-appearing.   HENT:      Right Ear: There is no impacted cerumen.      Left Ear: There is no impacted cerumen.   Eyes:      General: No scleral icterus.     Conjunctiva/sclera: Conjunctivae normal.      Pupils: Pupils are equal, round, and reactive to light.   Cardiovascular:      Rate and Rhythm: Normal " rate and regular rhythm.      Pulses: Normal pulses.      Heart sounds: No murmur heard.     Pulmonary:      Effort: Pulmonary effort is normal. No respiratory distress.      Breath sounds: Normal breath sounds. No wheezing.   Musculoskeletal:      Right lower leg: No edema.      Left lower leg: No edema.   Skin:     General: Skin is warm and dry.      Findings: No rash.   Neurological:      Motor: No weakness.      Gait: Gait normal.      Deep Tendon Reflexes: Reflexes normal.   Psychiatric:         Mood and Affect: Mood normal.         Behavior: Behavior normal.         Thought Content: Thought content normal.         Judgment: Judgment normal.               Assessment and Plan:   Neli is a 75 y.o. female with the following:  Problem List Items Addressed This Visit     Hyperlipidemia     Previous problem that requires follow-up.  She had worsening of her triglycerides on last lab check.  Will update cholesterol levels, she is been working harder to increase her exercise.  Continue lovastatin 40 mg daily.         Relevant Orders    Comp Metabolic Panel    Lipid Profile    Acquired hypothyroidism     Chronic and ongoing problem.  Update thyroid function to ensure stability.  Continue levothyroxine 50 mcg daily.         Relevant Orders    CBC WITH DIFFERENTIAL    Comp Metabolic Panel    TSH WITH REFLEX TO FT4    Benign hypertension     Chronic and stable problem, continue losartan 100 mg daily. Update electrolytes and kidney function to ensure stability.         Relevant Orders    CBC WITH DIFFERENTIAL    Comp Metabolic Panel    Prediabetes     Previous problem requires follow-up.  She has history of prediabetes.  She has been trying to be more active with exercise.  Will update A1c to ensure stability, no indication to start pharmacotherapy at this time.         Relevant Orders    Comp Metabolic Panel    HEMOGLOBIN A1C    Vitamin D deficiency     Chronic and ongoing problem.  Update vitamin D level to ensure  stability.  Continue vitamin D 2000 international units daily.         Relevant Orders    VITAMIN D,25 HYDROXY             Annual Health Assessment Questions:    1.  Are you currently engaging in any exercise or physical activity? Yes    2.  How would you describe your mood or emotional well-being today? good    3.  Have you had any falls in the last year? No    4.  Have you noticed any problems with your balance or had difficulty walking? No    5.  In the last six months have you experienced any leakage of urine? No    6. DPA/Advanced Directive: Patient does not have an Advanced Directive.  A packet and workshop information was given on Advanced Directives.       RTC: Return in about 6 months (around 12/16/2021).    I spent a total of 35 minutes with record review, exam, communication with the patient, communication with other providers, and documentation of this encounter.    PLEASE NOTE: This dictation was created using voice recognition software. I have made every reasonable attempt to correct obvious errors, but I expect that there are errors of grammar and possibly content that I did not discover before finalizing the note.      Fariha Gold, DO  Geriatric and Internal Medicine  [facility]

## 2021-06-16 NOTE — ASSESSMENT & PLAN NOTE
Chronic and stable problem, continue losartan 100 mg daily. Update electrolytes and kidney function to ensure stability.

## 2021-06-16 NOTE — ASSESSMENT & PLAN NOTE
Previous problem that requires follow-up.  She had worsening of her triglycerides on last lab check.  Will update cholesterol levels, she is been working harder to increase her exercise.  Continue lovastatin 40 mg daily.

## 2021-06-23 ENCOUNTER — PATIENT MESSAGE (OUTPATIENT)
Dept: MEDICAL GROUP | Facility: PHYSICIAN GROUP | Age: 76
End: 2021-06-23

## 2021-06-24 NOTE — PATIENT COMMUNICATION
Spoke with patient and she states she just had an colonoscopy and she will try to get her BP in the next couple of days.

## 2021-06-28 ENCOUNTER — NON-PROVIDER VISIT (OUTPATIENT)
Dept: MEDICAL GROUP | Facility: PHYSICIAN GROUP | Age: 76
End: 2021-06-28
Payer: MEDICARE

## 2021-06-28 ENCOUNTER — TELEPHONE (OUTPATIENT)
Dept: MEDICAL GROUP | Facility: PHYSICIAN GROUP | Age: 76
End: 2021-06-28

## 2021-06-28 VITALS — DIASTOLIC BLOOD PRESSURE: 80 MMHG | SYSTOLIC BLOOD PRESSURE: 152 MMHG

## 2021-06-28 PROCEDURE — 99999 PR NO CHARGE: CPT | Performed by: INTERNAL MEDICINE

## 2021-06-28 NOTE — TELEPHONE ENCOUNTER
Patient called and was concerned about her blood pressure readings from the past couple days. States she is feeling great and is consuming no coffee or teas. Please advise.     Blood Pressure Readings:    06/25 - 06/28 AM  175/101  189/112  200/105  183/107    06/26-06/27 PM  181/101  200/120

## 2021-06-28 NOTE — PROGRESS NOTES
Neli Hernandez is a 75 y.o. female here for a non-provider visit for a blood pressure check.     Once blood pressure machine was fixed from KPA to mmHg we started correctly taking blood pressure.     Home monitor reading was 151/98.    Manual BP reading was 152/80.    Vitals:    06/28/21 1623 06/28/21 1624   BP: 151/98 152/80   BP Location: Left arm Left arm   Patient Position: Sitting Sitting   BP Cuff Size: Adult Adult     If abnormal, was the Registered Nurse (office provider if RN is unavailable) notified today? Yes    Routed to PCP? Yes

## 2021-07-01 ENCOUNTER — TELEPHONE (OUTPATIENT)
Dept: MEDICAL GROUP | Facility: PHYSICIAN GROUP | Age: 76
End: 2021-07-01

## 2021-07-01 NOTE — TELEPHONE ENCOUNTER
Called patient and she reports:    138/78  6/28    130/83  6/29    130/76  6/29    She will continue to monitor and will call additional readings.

## 2021-07-01 NOTE — TELEPHONE ENCOUNTER
Can you follow up on BP readings for patient? She was 150s systolic in office when she came in, are her home readings still that elevated? Thanks!

## 2021-07-01 NOTE — TELEPHONE ENCOUNTER
Phone Number Called: [phone]    Call outcome: Spoke to patient regarding message below.    Message: new reading    138/78 6/28/21  130/83 6/29/21  130/76 6/29/21

## 2021-07-14 ENCOUNTER — TELEPHONE (OUTPATIENT)
Dept: MEDICAL GROUP | Facility: PHYSICIAN GROUP | Age: 76
End: 2021-07-14

## 2021-07-14 NOTE — TELEPHONE ENCOUNTER
1. Caller Name: Neli Hernandez                          Call Back Number: [phone] (home)         How would the patient prefer to be contacted with a response: Phone call OK to leave a detailed message    Called patient regarding her call about blood pressure  We tried to connect multiple times but she was in her car ans the line kept dropping

## 2021-07-15 ENCOUNTER — NON-PROVIDER VISIT (OUTPATIENT)
Dept: MEDICAL GROUP | Facility: PHYSICIAN GROUP | Age: 76
End: 2021-07-15

## 2021-07-15 VITALS — SYSTOLIC BLOOD PRESSURE: 160 MMHG | DIASTOLIC BLOOD PRESSURE: 92 MMHG

## 2021-07-15 NOTE — PROGRESS NOTES
Neli Hernandez is a 75 y.o. female here for a non-provider visit for blood pressure check.   Blood pressure machine read 165/102 with a pulse of 78. After 5 minutes we retook her BP manually and it was 160/92. Scheduled a follow up appointment with Dr. Gold on 07/19.       Vitals:    07/15/21 1124 07/15/21 1135   BP: (!) 165/102 160/92   BP Location: Left arm Left arm   Patient Position: Sitting Sitting   BP Cuff Size: Adult Adult     If abnormal, was the Registered Nurse (office provider if RN is unavailable) notified today? Yes    Routed to PCP? No

## 2021-07-19 ENCOUNTER — OFFICE VISIT (OUTPATIENT)
Dept: MEDICAL GROUP | Facility: PHYSICIAN GROUP | Age: 76
End: 2021-07-19
Payer: MEDICARE

## 2021-07-19 VITALS
DIASTOLIC BLOOD PRESSURE: 72 MMHG | WEIGHT: 115 LBS | HEART RATE: 79 BPM | RESPIRATION RATE: 12 BRPM | BODY MASS INDEX: 21.16 KG/M2 | TEMPERATURE: 98.3 F | SYSTOLIC BLOOD PRESSURE: 148 MMHG | HEIGHT: 62 IN | OXYGEN SATURATION: 96 %

## 2021-07-19 DIAGNOSIS — Q67.8 CHEST WALL ASYMMETRY: ICD-10-CM

## 2021-07-19 DIAGNOSIS — I10 BENIGN HYPERTENSION: ICD-10-CM

## 2021-07-19 PROCEDURE — 99214 OFFICE O/P EST MOD 30 MIN: CPT | Performed by: INTERNAL MEDICINE

## 2021-07-19 RX ORDER — METOPROLOL SUCCINATE 25 MG/1
25 TABLET, EXTENDED RELEASE ORAL DAILY
Qty: 30 TABLET | Refills: 1 | Status: SHIPPED | OUTPATIENT
Start: 2021-07-19 | End: 2021-08-19 | Stop reason: SDUPTHER

## 2021-07-19 ASSESSMENT — FIBROSIS 4 INDEX: FIB4 SCORE: 1.07

## 2021-07-20 NOTE — PROGRESS NOTES
Subjective:   Chief Complaint/History of Present Illness:  Neli Hernandez is a 75 y.o. female established patient who presents today to discuss medical problems as listed below. Neli is unaccompanied for today's visit.    Problem   Chest Wall Asymmetry    She reports what feels like a fullness under the left breast around rib 10-11 on the anterior aspect. No preceding injury. She previously completed a biopsy of the left breast at the inferior aspect and feels that the residual void from the biopsy site may be causing her to appreciate the fullness moreso. No associated pain or tenderness. No overlying bruising or skin change. Does not seem to be worsening.     Benign Hypertension    She has a history of hypertension since her late 50's on monotherapy with Losartan.      Her blood pressure tends to run higher when she is at the doctor due to white coat hypertension. Her heart rate at home tends to run in the 60's and she has a wrist BP cuff but does not usually check it.  She had recent decompensation of her blood pressure in early summer 2021.  Her blood pressure will run as high as the 170s systolic but often not below the 140s systolic.  She also has some associated palpitations with heart rate up to the low 100s.    No headaches, chest pain, or dyspnea on exertion.    BP in clinic ranging 148-160/72-92.    Current regimen: losartan 100 mg daily and metoprolol succinate 25 mg daily          Current Medications:  Current Outpatient Medications Ordered in Epic   Medication Sig Dispense Refill   • metoprolol SR (TOPROL XL) 25 MG TABLET SR 24 HR Take 1 tablet by mouth every day. 30 tablet 1   • Omega-3 Fatty Acids (FISH OIL) 1000 MG CAPSULE DELAYED RELEASE EC softgel capsule Take 2,000 mg by mouth every morning with breakfast.     • losartan (COZAAR) 100 MG Tab Take 1 Tab by mouth every day. 100 Tab 3   • levothyroxine (LEVO-T) 50 MCG Tab Take 1 Tab by mouth Every morning on an empty stomach. 100 Tab 3   •  "lovastatin (MEVACOR) 40 MG tablet Take 1 Tab by mouth every day. 100 Tab 3   • Magnesium 250 MG Tab      • Cholecalciferol (VITAMIN D3) 2000 UNIT CAPS Take 1 Cap by mouth every day.       No current [facility]-ordered facility-administered medications on file.          Objective:   Physical Exam:    Vitals: /72 (BP Location: Left arm, Patient Position: Sitting, BP Cuff Size: Adult)   Pulse 79   Temp 36.8 °C (98.3 °F) (Temporal)   Resp 12   Ht 1.562 m (5' 1.5\")   Wt 52.2 kg (115 lb)   SpO2 96%    BMI: Body mass index is 21.38 kg/m².  Physical Exam  Constitutional:       General: She is not in acute distress.     Appearance: She is not ill-appearing.   Eyes:      Extraocular Movements: Extraocular movements intact.      Conjunctiva/sclera: Conjunctivae normal.   Pulmonary:      Effort: Pulmonary effort is normal. No respiratory distress.   Chest:      Breasts:         Right: No inverted nipple, mass, nipple discharge or tenderness.         Left: No inverted nipple, mass, nipple discharge or tenderness.          Comments: Asymmetry under left breast tissue at site of previous biopsy. Under left anterior lower ribs (~rib 10-11) there was not tenderness or appreciable mass palpated.  Skin:     General: Skin is warm and dry.      Findings: No rash.   Psychiatric:         Mood and Affect: Mood normal.         Behavior: Behavior normal.         Thought Content: Thought content normal.         Judgment: Judgment normal.          Assessment and Plan:   Neli is a 75 y.o. female with the following:  Problem List Items Addressed This Visit     Benign hypertension     Chronic and decompensated problem.  At first we thought her blood pressure elevations may be whitecoat hypertension however they have persisted even at home on her own cuff.  We have agreed to add metoprolol succinate 25 mg daily in addition to her regimen of losartan 100 mg daily.  Advised her to continue checking at least once daily at various times of " the day both blood pressure and heart rate and let us know if her blood pressure becomes at goal which would be averaging less than 140/80.  She will monitor for heart rate to make sure it does not go below 60 and also monitor for worsening fatigue or signs/symptoms orthostasis.  She does not have any underlying asthma.  We will have my nurse call her next week for an update and we can continue going up on the metoprolol succinate as needed until her blood pressure is at goal.  She is agreeable with this plan and will also plan to get her lab work updated to make sure there are no new electrolyte, thyroid, or kidney abnormalities that can be contributing to the sudden change in blood pressure.         Relevant Medications    metoprolol SR (TOPROL XL) 25 MG TABLET SR 24 HR    Chest wall asymmetry     New problem.  Exam appears benign.  She is nontender over the anterior ribs to correspond to the area of fullness she was appreciating.  I suspect that it may seem more predominant than the contralateral side due to the prior biopsy on the inferior aspect of the left breast which has left a decrease amount of breast tissue and perhaps me the ribs appear more full.  Advised her to continue monitoring closely and if the area would become tender or enlarging could proceed with additional imaging.  She agrees and will keep me updated.                RTC: Return in about 5 months (around 12/19/2021).    I spent a total of 35 minutes with record review, exam, communication with the patient, communication with other providers, and documentation of this encounter.    PLEASE NOTE: This dictation was created using voice recognition software. I have made every reasonable attempt to correct obvious errors, but I expect that there are errors of grammar and possibly content that I did not discover before finalizing the note.      Fariha Gold, DO  Geriatric and Internal Medicine  [facility]

## 2021-07-20 NOTE — ASSESSMENT & PLAN NOTE
New problem.  Exam appears benign.  She is nontender over the anterior ribs to correspond to the area of fullness she was appreciating.  I suspect that it may seem more predominant than the contralateral side due to the prior biopsy on the inferior aspect of the left breast which has left a decrease amount of breast tissue and perhaps me the ribs appear more full.  Advised her to continue monitoring closely and if the area would become tender or enlarging could proceed with additional imaging.  She agrees and will keep me updated.

## 2021-07-20 NOTE — ASSESSMENT & PLAN NOTE
Chronic and decompensated problem.  At first we thought her blood pressure elevations may be whitecoat hypertension however they have persisted even at home on her own cuff.  We have agreed to add metoprolol succinate 25 mg daily in addition to her regimen of losartan 100 mg daily.  Advised her to continue checking at least once daily at various times of the day both blood pressure and heart rate and let us know if her blood pressure becomes at goal which would be averaging less than 140/80.  She will monitor for heart rate to make sure it does not go below 60 and also monitor for worsening fatigue or signs/symptoms orthostasis.  She does not have any underlying asthma.  We will have my nurse call her next week for an update and we can continue going up on the metoprolol succinate as needed until her blood pressure is at goal.  She is agreeable with this plan and will also plan to get her lab work updated to make sure there are no new electrolyte, thyroid, or kidney abnormalities that can be contributing to the sudden change in blood pressure.

## 2021-08-05 ENCOUNTER — OFFICE VISIT (OUTPATIENT)
Dept: MEDICAL GROUP | Facility: PHYSICIAN GROUP | Age: 76
End: 2021-08-05
Payer: MEDICARE

## 2021-08-05 VITALS
OXYGEN SATURATION: 95 % | RESPIRATION RATE: 12 BRPM | HEART RATE: 74 BPM | WEIGHT: 118.4 LBS | TEMPERATURE: 97.9 F | DIASTOLIC BLOOD PRESSURE: 88 MMHG | HEIGHT: 62 IN | BODY MASS INDEX: 21.79 KG/M2 | SYSTOLIC BLOOD PRESSURE: 142 MMHG

## 2021-08-05 DIAGNOSIS — I10 BENIGN HYPERTENSION: ICD-10-CM

## 2021-08-05 PROCEDURE — 99213 OFFICE O/P EST LOW 20 MIN: CPT | Performed by: INTERNAL MEDICINE

## 2021-08-05 RX ORDER — TRIAMTERENE AND HYDROCHLOROTHIAZIDE 37.5; 25 MG/1; MG/1
1 TABLET ORAL DAILY
Qty: 30 TABLET | Refills: 3 | Status: SHIPPED | OUTPATIENT
Start: 2021-08-05 | End: 2021-08-19 | Stop reason: SDUPTHER

## 2021-08-05 ASSESSMENT — FIBROSIS 4 INDEX: FIB4 SCORE: 1.07

## 2021-08-06 NOTE — PROGRESS NOTES
Subjective:   Chief Complaint/History of Present Illness:  Neli Hernandez is a 75 y.o. female established patient who presents today to discuss medical problems as listed below. Neli is unaccompanied fr today's visit.    Problem   Benign Hypertension    She has a history of hypertension since her late 50's on monotherapy with Losartan.      Her blood pressure tends to run higher when she is at the doctor due to white coat hypertension. Her heart rate at home tends to run in the 60's and she has a wrist BP cuff but does not usually check it.  She had recent decompensation of her blood pressure in early summer 2021.  Her blood pressure will run as high as the 170s systolic but often not below the 140s systolic.  She also has some associated palpitations with heart rate up to the low 100s.    No headaches, chest pain, or dyspnea on exertion.    BP in clinic ranging 142-160/72-92.    Current regimen: losartan 100 mg daily, triamterene-hctz 37.5-25 mg daily and metoprolol succinate 25 mg daily          Current Medications:  Current Outpatient Medications Ordered in Epic   Medication Sig Dispense Refill   • triamterene-hctz (MAXZIDE-25/DYAZIDE) 37.5-25 MG Tab Take 1 tablet by mouth every day. 30 tablet 3   • metoprolol SR (TOPROL XL) 25 MG TABLET SR 24 HR Take 1 tablet by mouth every day. 30 tablet 1   • Omega-3 Fatty Acids (FISH OIL) 1000 MG CAPSULE DELAYED RELEASE EC softgel capsule Take 2,000 mg by mouth every morning with breakfast.     • losartan (COZAAR) 100 MG Tab Take 1 Tab by mouth every day. 100 Tab 3   • levothyroxine (LEVO-T) 50 MCG Tab Take 1 Tab by mouth Every morning on an empty stomach. 100 Tab 3   • lovastatin (MEVACOR) 40 MG tablet Take 1 Tab by mouth every day. 100 Tab 3   • Magnesium 250 MG Tab      • Cholecalciferol (VITAMIN D3) 2000 UNIT CAPS Take 1 Cap by mouth every day.       No current [facility]-ordered facility-administered medications on file.          Objective:   Physical Exam:    Vitals: BP  "142/88 (BP Location: Right arm, Patient Position: Sitting, BP Cuff Size: Adult)   Pulse 74   Temp 36.6 °C (97.9 °F) (Temporal)   Resp 12   Ht 1.562 m (5' 1.5\")   Wt 53.7 kg (118 lb 6.4 oz)   SpO2 95%    BMI: Body mass index is 22.01 kg/m².  Physical Exam  Constitutional:       General: She is not in acute distress.     Appearance: Normal appearance. She is not ill-appearing.   Eyes:      Extraocular Movements: Extraocular movements intact.      Conjunctiva/sclera: Conjunctivae normal.   Pulmonary:      Effort: Pulmonary effort is normal. No respiratory distress.   Musculoskeletal:      Right lower leg: No edema.      Left lower leg: No edema.   Skin:     General: Skin is dry.      Findings: No bruising or rash.   Neurological:      Gait: Gait normal.   Psychiatric:         Mood and Affect: Mood normal.         Behavior: Behavior normal.         Thought Content: Thought content normal.         Judgment: Judgment normal.          Assessment and Plan:   Neli is a 75 y.o. female with the following:  Problem List Items Addressed This Visit     Benign hypertension     Chronic and ongoing problem, requires medication adjustment.  Will add diuretic to assist with better control blood pressure.  Start triamterene-hydrochlorothiazide 37.5-25 mg daily, monitor for excessive urine output.  Her palpitations and heart rate better with metoprolol.  Continue losartan unchanged.  She will update me over mychart in the next week, she will check twice daily meantime.  She will monitor for signs or symptoms of orthostasis/overtreatment.  She continues to live a very healthy lifestyle and remains active.         Relevant Medications    triamterene-hctz (MAXZIDE-25/DYAZIDE) 37.5-25 MG Tab         RTC: Return if symptoms worsen or fail to improve.    I spent a total of 29 minutes with record review, exam, communication with the patient, communication with other providers, and documentation of this encounter.    PLEASE NOTE: This " dictation was created using voice recognition software. I have made every reasonable attempt to correct obvious errors, but I expect that there are errors of grammar and possibly content that I did not discover before finalizing the note.      Fariha Gold, DO  Geriatric and Internal Medicine  [facility]

## 2021-08-11 ENCOUNTER — PATIENT MESSAGE (OUTPATIENT)
Dept: HEALTH INFORMATION MANAGEMENT | Facility: OTHER | Age: 76
End: 2021-08-11

## 2021-08-11 ENCOUNTER — TELEPHONE (OUTPATIENT)
Dept: MEDICAL GROUP | Facility: PHYSICIAN GROUP | Age: 76
End: 2021-08-11

## 2021-08-11 NOTE — TELEPHONE ENCOUNTER
1. Caller Name: Neli Hernandez                        Call Back Number: [phone] (home)       How would the patient prefer to be contacted with a response: Phone call OK to leave a detailed message    Patient LVM saying her BP reading are going great w/ the new medication    8/7 112/66 118/71  8/8 124/79 116/72  8/9 117/79 122/74   8/10 104/66  But states she is feeling dizzy and out of balance also that she is having 0 energy. Please Advise.

## 2021-08-19 DIAGNOSIS — E78.5 HYPERLIPIDEMIA, UNSPECIFIED HYPERLIPIDEMIA TYPE: ICD-10-CM

## 2021-08-19 DIAGNOSIS — E03.9 ACQUIRED HYPOTHYROIDISM: ICD-10-CM

## 2021-08-19 DIAGNOSIS — I10 BENIGN HYPERTENSION: ICD-10-CM

## 2021-08-19 DIAGNOSIS — E55.9 VITAMIN D DEFICIENCY: ICD-10-CM

## 2021-08-19 RX ORDER — TRIAMTERENE AND HYDROCHLOROTHIAZIDE 37.5; 25 MG/1; MG/1
1 TABLET ORAL DAILY
Qty: 100 TABLET | Refills: 3 | Status: SHIPPED | OUTPATIENT
Start: 2021-08-19 | End: 2021-08-27

## 2021-08-19 RX ORDER — METOPROLOL SUCCINATE 25 MG/1
25 TABLET, EXTENDED RELEASE ORAL DAILY
Qty: 100 TABLET | Refills: 3 | Status: SHIPPED | OUTPATIENT
Start: 2021-08-19 | End: 2022-06-16 | Stop reason: SDUPTHER

## 2021-08-19 RX ORDER — LOVASTATIN 40 MG/1
40 TABLET ORAL DAILY
Qty: 100 TABLET | Refills: 3 | Status: SHIPPED | OUTPATIENT
Start: 2021-08-19 | End: 2022-06-16 | Stop reason: SDUPTHER

## 2021-08-19 RX ORDER — LEVOTHYROXINE SODIUM 0.05 MG/1
TABLET ORAL
Qty: 124 TABLET | Refills: 3 | Status: SHIPPED | OUTPATIENT
Start: 2021-08-19 | End: 2022-06-16 | Stop reason: SDUPTHER

## 2021-08-19 RX ORDER — ACETAMINOPHEN 160 MG
1 TABLET,DISINTEGRATING ORAL DAILY
Qty: 100 CAPSULE | Refills: 3 | Status: SHIPPED | OUTPATIENT
Start: 2021-08-19

## 2021-08-19 RX ORDER — LEVOTHYROXINE SODIUM 0.05 MG/1
50 TABLET ORAL
Qty: 100 TABLET | Refills: 3 | Status: SHIPPED | OUTPATIENT
Start: 2021-08-19 | End: 2021-08-19 | Stop reason: SDUPTHER

## 2021-08-19 RX ORDER — LOSARTAN POTASSIUM 100 MG/1
100 TABLET ORAL
Qty: 100 TABLET | Refills: 3 | Status: SHIPPED | OUTPATIENT
Start: 2021-08-19 | End: 2021-12-15 | Stop reason: SDUPTHER

## 2021-08-20 NOTE — TELEPHONE ENCOUNTER
Received request via: Patient    Was the patient seen in the last year in this department? Yes    Does the patient have an active prescription (recently filled or refills available) for medication(s) requested? No       Correction to levothyroxine      Patient takes two on Tuesday and Thursday extra 24 pills a month

## 2021-08-26 ENCOUNTER — TELEPHONE (OUTPATIENT)
Dept: HEALTH INFORMATION MANAGEMENT | Facility: OTHER | Age: 76
End: 2021-08-26

## 2021-08-26 NOTE — TELEPHONE ENCOUNTER
Tried to assist member with her my chart log in. Tried to contact my chart team [phone], no answer, no message. Number given to member and she will continue to get assistance.

## 2021-08-27 DIAGNOSIS — I10 BENIGN HYPERTENSION: ICD-10-CM

## 2021-08-27 RX ORDER — TRIAMTERENE AND HYDROCHLOROTHIAZIDE 37.5; 25 MG/1; MG/1
1 TABLET ORAL DAILY
Qty: 30 TABLET | Refills: 3 | Status: SHIPPED | OUTPATIENT
Start: 2021-08-27 | End: 2021-12-16 | Stop reason: SDUPTHER

## 2021-09-13 NOTE — TELEPHONE ENCOUNTER
HIPAA verified - Member called to see if COVID Booster is available. Advised not at the moment, referred to check local pharmacies or call back for updates.     -aep

## 2021-10-01 ENCOUNTER — PATIENT MESSAGE (OUTPATIENT)
Dept: HEALTH INFORMATION MANAGEMENT | Facility: OTHER | Age: 76
End: 2021-10-01

## 2021-11-05 ENCOUNTER — HOSPITAL ENCOUNTER (OUTPATIENT)
Dept: RADIOLOGY | Facility: MEDICAL CENTER | Age: 76
End: 2021-11-05
Attending: INTERNAL MEDICINE
Payer: MEDICARE

## 2021-11-05 DIAGNOSIS — Z12.31 VISIT FOR SCREENING MAMMOGRAM: ICD-10-CM

## 2021-11-05 PROCEDURE — 77063 BREAST TOMOSYNTHESIS BI: CPT

## 2021-12-13 ENCOUNTER — HOSPITAL ENCOUNTER (OUTPATIENT)
Dept: LAB | Facility: MEDICAL CENTER | Age: 76
End: 2021-12-13
Attending: INTERNAL MEDICINE
Payer: MEDICARE

## 2021-12-13 DIAGNOSIS — E03.9 ACQUIRED HYPOTHYROIDISM: ICD-10-CM

## 2021-12-13 DIAGNOSIS — R73.03 PREDIABETES: ICD-10-CM

## 2021-12-13 DIAGNOSIS — E55.9 VITAMIN D DEFICIENCY: ICD-10-CM

## 2021-12-13 DIAGNOSIS — I10 BENIGN HYPERTENSION: ICD-10-CM

## 2021-12-13 DIAGNOSIS — E78.2 MIXED HYPERLIPIDEMIA: ICD-10-CM

## 2021-12-13 LAB
ALBUMIN SERPL BCP-MCNC: 4.5 G/DL (ref 3.2–4.9)
ALBUMIN/GLOB SERPL: 1.3 G/DL
ALP SERPL-CCNC: 90 U/L (ref 30–99)
ALT SERPL-CCNC: 19 U/L (ref 2–50)
ANION GAP SERPL CALC-SCNC: 12 MMOL/L (ref 7–16)
AST SERPL-CCNC: 11 U/L (ref 12–45)
BASOPHILS # BLD AUTO: 1.2 % (ref 0–1.8)
BASOPHILS # BLD: 0.08 K/UL (ref 0–0.12)
BILIRUB SERPL-MCNC: 0.3 MG/DL (ref 0.1–1.5)
BUN SERPL-MCNC: 30 MG/DL (ref 8–22)
CALCIUM SERPL-MCNC: 9.7 MG/DL (ref 8.5–10.5)
CHLORIDE SERPL-SCNC: 104 MMOL/L (ref 96–112)
CHOLEST SERPL-MCNC: 176 MG/DL (ref 100–199)
CO2 SERPL-SCNC: 25 MMOL/L (ref 20–33)
CREAT SERPL-MCNC: 1.03 MG/DL (ref 0.5–1.4)
EOSINOPHIL # BLD AUTO: 0.21 K/UL (ref 0–0.51)
EOSINOPHIL NFR BLD: 3.1 % (ref 0–6.9)
ERYTHROCYTE [DISTWIDTH] IN BLOOD BY AUTOMATED COUNT: 44.5 FL (ref 35.9–50)
EST. AVERAGE GLUCOSE BLD GHB EST-MCNC: 105 MG/DL
FASTING STATUS PATIENT QL REPORTED: NORMAL
GLOBULIN SER CALC-MCNC: 3.4 G/DL (ref 1.9–3.5)
GLUCOSE SERPL-MCNC: 100 MG/DL (ref 65–99)
HBA1C MFR BLD: 5.3 % (ref 4–5.6)
HCT VFR BLD AUTO: 44 % (ref 37–47)
HDLC SERPL-MCNC: 32 MG/DL
HGB BLD-MCNC: 14.7 G/DL (ref 12–16)
IMM GRANULOCYTES # BLD AUTO: 0.05 K/UL (ref 0–0.11)
IMM GRANULOCYTES NFR BLD AUTO: 0.7 % (ref 0–0.9)
LDLC SERPL CALC-MCNC: 85 MG/DL
LYMPHOCYTES # BLD AUTO: 1.9 K/UL (ref 1–4.8)
LYMPHOCYTES NFR BLD: 28.4 % (ref 22–41)
MCH RBC QN AUTO: 32.5 PG (ref 27–33)
MCHC RBC AUTO-ENTMCNC: 33.4 G/DL (ref 33.6–35)
MCV RBC AUTO: 97.3 FL (ref 81.4–97.8)
MONOCYTES # BLD AUTO: 0.59 K/UL (ref 0–0.85)
MONOCYTES NFR BLD AUTO: 8.8 % (ref 0–13.4)
NEUTROPHILS # BLD AUTO: 3.87 K/UL (ref 2–7.15)
NEUTROPHILS NFR BLD: 57.8 % (ref 44–72)
NRBC # BLD AUTO: 0 K/UL
NRBC BLD-RTO: 0 /100 WBC
PLATELET # BLD AUTO: 299 K/UL (ref 164–446)
PMV BLD AUTO: 11 FL (ref 9–12.9)
POTASSIUM SERPL-SCNC: 4.1 MMOL/L (ref 3.6–5.5)
PROT SERPL-MCNC: 7.9 G/DL (ref 6–8.2)
RBC # BLD AUTO: 4.52 M/UL (ref 4.2–5.4)
SODIUM SERPL-SCNC: 141 MMOL/L (ref 135–145)
TRIGL SERPL-MCNC: 294 MG/DL (ref 0–149)
TSH SERPL DL<=0.005 MIU/L-ACNC: 2.25 UIU/ML (ref 0.38–5.33)
WBC # BLD AUTO: 6.7 K/UL (ref 4.8–10.8)

## 2021-12-13 PROCEDURE — 83036 HEMOGLOBIN GLYCOSYLATED A1C: CPT

## 2021-12-13 PROCEDURE — 36415 COLL VENOUS BLD VENIPUNCTURE: CPT

## 2021-12-13 PROCEDURE — 80053 COMPREHEN METABOLIC PANEL: CPT

## 2021-12-13 PROCEDURE — 80061 LIPID PANEL: CPT

## 2021-12-13 PROCEDURE — 84443 ASSAY THYROID STIM HORMONE: CPT

## 2021-12-13 PROCEDURE — 85025 COMPLETE CBC W/AUTO DIFF WBC: CPT

## 2021-12-13 PROCEDURE — 82306 VITAMIN D 25 HYDROXY: CPT

## 2021-12-15 ENCOUNTER — TELEPHONE (OUTPATIENT)
Dept: MEDICAL GROUP | Facility: PHYSICIAN GROUP | Age: 76
End: 2021-12-15

## 2021-12-15 DIAGNOSIS — I10 BENIGN HYPERTENSION: ICD-10-CM

## 2021-12-15 LAB — 25(OH)D3 SERPL-MCNC: 36 NG/ML (ref 30–80)

## 2021-12-15 NOTE — TELEPHONE ENCOUNTER
ANNUAL WELLNESS VISIT PRE-VISIT PLANNING    1.  Reviewed notes from the last office visit: Yes    2.  If any orders were ordered or intended to be done prior to visit (i.e. 6 mos follow-up), do we have results/consult notes or has patient scheduled?        •  Labs - Labs ordered, completed on 12/13/21 and results are in chart.  Note: If patient appointment is for lab review and patient did not complete labs, check with provider if OK to reschedule patient until labs completed.       •  Imaging - Imaging ordered, completed and results are in chart.       •  Referrals - No referrals were ordered at last office visit.    3.  Immunizations were updated in Epic using Reconcile Outside Information activity? Yes       •  Is patient due for Tdap? NO       •  Is patient due for Shingrix? NO    4.  Patient is due for the following Health Maintenance Topics:   Health Maintenance Due   Topic Date Due   • Annual Wellness Visit  Never done       - Patient already has appointment scheduled for Annual Wellness Visit (AWV).    5.  Reviewed/Updated the following with patient:       •   Preferred Pharmacy? Yes       •   Preferred Lab? Yes       •   Preferred Communication? Yes       •   Allergies? Yes       •   Medications? YES. Was Abstract Encounter opened and chart updated? YES       •   Social History? No       •   Family History (document living status of immediate family members and if + hx of  cancer, diabetes, hypertension, hyperlipidemia, heart attack, stroke) No    6.  Care Team Updated:       •   DME Company (gait device, O2, CPAP, etc.): N\A       •   Other Specialists (eye doctor, derm, GYN, cardiology, endo, etc): N\A    7.  Patient was advised: “This is a free wellness visit. The provider will screen for medical conditions to help you stay healthy. If you have other concerns to address you may be asked to discuss these at a separate visit or there may be an additional fee.”     8.  AHA (Puls8) form printed for Provider?  Yes

## 2021-12-15 NOTE — PROGRESS NOTES
Future Appointments       Provider Department [place]    12/16/2021 11:00 AM Fariha Gold D.O. [facility] [place]           NEW PATIENT VISIT PRE-VISIT PLANNING    1.  EpicCare Patient is checked in Patient Demographics? YES    2.  Immunizations were updated in Epic using WebIZ?: Epic matches WebIZ       •  Web Iz Recommendations: Patient is up to date on all vaccines    3.  Patient is due for the following Health Maintenance Topics:   Health Maintenance Due   Topic Date Due   • Annual Wellness Visit  Never done       - Patient already has appointment scheduled for Annual Wellness Visit (AWV).    4.  Reviewed/Updated the following with patient:       •   Preferred Pharmacy? YES       •   Preferred Lab? YES       •   Medications? YES. Was Abstract Encounter opened and chart updated? YES       •   Social History? NO       •   Family History? NO    5.  Updated Care Team?       •   DME Company (gait device, O2, CPAP, etc.) N\A       •   Other Specialists (eye doctor, derm, GYN, cardiology, endo, etc): N\A    6. The following was sent to pt.:       A)  New Patient Packet & Health Questionnaire  mailed / emailed to patient?  no    7.  Patient was informed to arrive 15 min prior to their scheduled appointment and bring in their medication bottles? YES    8.Other medical issues to discuss with the provider during the visit: AWV

## 2021-12-16 ENCOUNTER — OFFICE VISIT (OUTPATIENT)
Dept: MEDICAL GROUP | Facility: PHYSICIAN GROUP | Age: 76
End: 2021-12-16
Payer: MEDICARE

## 2021-12-16 VITALS
HEIGHT: 62 IN | WEIGHT: 122.3 LBS | SYSTOLIC BLOOD PRESSURE: 138 MMHG | HEART RATE: 62 BPM | OXYGEN SATURATION: 97 % | DIASTOLIC BLOOD PRESSURE: 60 MMHG | BODY MASS INDEX: 22.5 KG/M2 | TEMPERATURE: 96.9 F | RESPIRATION RATE: 12 BRPM

## 2021-12-16 DIAGNOSIS — E03.9 ACQUIRED HYPOTHYROIDISM: ICD-10-CM

## 2021-12-16 DIAGNOSIS — R73.03 PREDIABETES: ICD-10-CM

## 2021-12-16 DIAGNOSIS — I10 BENIGN HYPERTENSION: ICD-10-CM

## 2021-12-16 DIAGNOSIS — Z78.0 MENOPAUSE: ICD-10-CM

## 2021-12-16 DIAGNOSIS — Z00.00 ENCOUNTER FOR SUBSEQUENT ANNUAL WELLNESS VISIT (AWV) IN MEDICARE PATIENT: Primary | ICD-10-CM

## 2021-12-16 DIAGNOSIS — E78.2 MIXED HYPERLIPIDEMIA: ICD-10-CM

## 2021-12-16 PROCEDURE — G0439 PPPS, SUBSEQ VISIT: HCPCS | Performed by: INTERNAL MEDICINE

## 2021-12-16 RX ORDER — TRIAMTERENE AND HYDROCHLOROTHIAZIDE 37.5; 25 MG/1; MG/1
1 TABLET ORAL DAILY
Qty: 100 TABLET | Refills: 3 | Status: SHIPPED | OUTPATIENT
Start: 2021-12-16 | End: 2022-12-20 | Stop reason: SDUPTHER

## 2021-12-16 RX ORDER — LOSARTAN POTASSIUM 100 MG/1
100 TABLET ORAL
Qty: 100 TABLET | Refills: 3 | Status: SHIPPED | OUTPATIENT
Start: 2021-12-16 | End: 2022-12-05 | Stop reason: SDUPTHER

## 2021-12-16 ASSESSMENT — ACTIVITIES OF DAILY LIVING (ADL): BATHING_REQUIRES_ASSISTANCE: 0

## 2021-12-16 ASSESSMENT — FIBROSIS 4 INDEX: FIB4 SCORE: 0.63

## 2021-12-16 ASSESSMENT — ENCOUNTER SYMPTOMS: GENERAL WELL-BEING: EXCELLENT

## 2021-12-16 ASSESSMENT — PATIENT HEALTH QUESTIONNAIRE - PHQ9: CLINICAL INTERPRETATION OF PHQ2 SCORE: 0

## 2021-12-16 NOTE — PROGRESS NOTES
Chief Complaint   Patient presents with   • Annual Exam   • Lab Results         HPI:  Neli Hernandez is a 76 y.o. female here for Medicare Annual Wellness Visit     Problem   Prediabetes       Ref. Range 12/13/2021 11:48   Glycohemoglobin Latest Ref Range: 4.0 - 5.6 % 5.3   Estim. Avg Glu Latest Units: mg/dL 105     A1c 6.0 in mid August 2019. No prior A1c checks, she denies prior knowledge of pre-diabetes. She reports stable weight and has a normal BMI. She has had a decompensation of her triglycerides on recent analysis. No blurred vision, polyuria, or polydipsia. She has brought down her A1c through dietary changes.     Benign Hypertension    She has a history of hypertension since her late 50's on monotherapy with Losartan.      Her blood pressure tends to run higher when she is at the doctor due to white coat hypertension. Her heart rate at home tends to run in the 60's and she has a wrist BP cuff but does not usually check it.  She had recent decompensation of her blood pressure in early summer 2021.  Her blood pressure will run as high as the 170s systolic but often not below the 140s systolic.  She also has some associated palpitations with heart rate up to the low 100s.    No headaches, chest pain, or dyspnea on exertion.    BP in clinic ranging 138/62    Current regimen: losartan 100 mg daily, triamterene-hctz 37.5-25 mg daily and metoprolol succinate 25 mg daily     Acquired Hypothyroidism       Ref. Range 12/13/2021 11:48   TSH Latest Ref Range: 0.380 - 5.330 uIU/mL 2.250     Maintained on levothyroxine 50 mcg for many years, well controlled. She follows with endocrinology, Dr. Win. She denies any fluctuations or changes to her dose for quite some time. She denies any signs or symptoms of over treatment or under treatment at this time.     Hyperlipidemia       Ref. Range 12/13/2021 11:48   Cholesterol,Tot Latest Ref Range: 100 - 199 mg/dL 176   Triglycerides Latest Ref Range: 0 - 149 mg/dL 294 (H)    HDL Latest Ref Range: >=40 mg/dL 32 (A)   LDL Latest Ref Range: <100 mg/dL 85     She reports using lovastatin since her 60's. It was started due to elevated cholesterol values, she denies any history of cardiovascular or cerebrovascular disease.     She reports that her triglycerides have never been as high as they are now. No history of pancreatitis. She has never been on fenofibrate medications.      Current regimen: Lovastatin 40 mg daily              Current supplements as per medication list.       Allergies: Penicillins    Current social contact/activities: spending time with family and friends    She  reports that she has never smoked. She has never used smokeless tobacco. She reports current alcohol use. She reports that she does not use drugs.  Counseling given: Not Answered  Comment: continued abstinence      DPA/Advanced Directive:  Patient has Advanced Directive, but it is not on file. Instructed to bring in a copy to scan into their chart.    ROS:    Gait: Uses no assistive device  Ostomy: No  Other tubes: No  Amputations: No  Chronic oxygen use: No  Last eye exam: Dec 2021  Wears hearing aids: No   : Denies any urinary leakage during the last 6 months    Screening:    Depression Screening    Little interest or pleasure in doing things?  0 - not at all  Feeling down, depressed , or hopeless? 0 - not at all  Trouble falling or staying asleep, or sleeping too much?     Feeling tired or having little energy?     Poor appetite or overeating?     Feeling bad about yourself - or that you are a failure or have let yourself or your family down?    Trouble concentrating on things, such as reading the newspaper or watching television?    Moving or speaking so slowly that other people could have noticed.  Or the opposite - being so fidgety or restless that you have been moving around a lot more than usual?     Thoughts that you would be better off dead, or of hurting yourself?     Patient Health Questionnaire  Score:      If depressive symptoms identified deferred to follow up visit unless specifically addressed in assessment and plan.    Interpretation of PHQ-9 Total Score   Score Severity   1-4 No Depression   5-9 Mild Depression   10-14 Moderate Depression   15-19 Moderately Severe Depression   20-27 Severe Depression      Screening for Cognitive Impairment    Three Minute Recall (captain, garden, picture) 2/3 Forgot picture  Eric clock face with all 12 numbers and set the hands to show 5 past 8.  Yes    Cognitive concerns identified deferred for follow up unless specifically addressed in assessment and plan.    Fall Risk Assessment    Has the patient had two or more falls in the last year or any fall with injury in the last year?  No    Safety Assessment    Throw rugs on floor.  No  Handrails on all stairs.  No  Good lighting in all hallways.  Yes  Difficulty hearing.  No  Patient counseled about all safety risks that were identified.    Functional Assessment ADLs    Are there any barriers preventing you from cooking for yourself or meeting nutritional needs?  No.    Are there any barriers preventing you from driving safely or obtaining transportation?  No.    Are there any barriers preventing you from using a telephone or calling for help?  No.    Are there any barriers preventing you from shopping?  No.    Are there any barriers preventing you from taking care of your own finances?  No.    Are there any barriers preventing you from managing your medications?  No.    Are there any barriers preventing you from showering, bathing or dressing yourself? No.    Are you currently engaging in any exercise or physical activity?  Yes.  Home exercise every day   What is your perception of your health?  Excellent.    Health Maintenance Summary          Ordered - BONE DENSITY (Every 2 Years) Ordered on 12/16/2021 02/11/2020  DS-BONE DENSITY STUDY (DEXA)    10/29/2012  DS-BONE DENSITY STUDY (DEXA)          MAMMOGRAM (Yearly)  Next due on 11/5/2022 11/05/2021  MA-SCREENING MAMMO BILAT W/TOMOSYNTHESIS W/CAD    10/01/2020  MA-SCREENING MAMMO BILAT W/TOMOSYNTHESIS W/CAD    08/16/2019  MA-SCREENING MAMMO BILAT W/TOMOSYNTHESIS W/CAD    07/25/2018  MA-MAMMO SCREENING BILAT W/JUNIOR W/CAD    07/06/2017  MA-MAMMO SCREENING BILAT W/JUNIOR W/CAD    Only the first 5 history entries have been loaded, but more history exists.          Annual Wellness Visit (Every 366 Days) Next due on 12/17/2022 12/16/2021  Level of Service: ANNUAL WELLNESS VISIT-INCLUDES PPPS SUBSEQUE*          IMM DTaP/Tdap/Td Vaccine (2 - Td or Tdap) Next due on 3/27/2029    03/27/2019  Imm Admin: Tdap Vaccine    08/11/2010  Imm Admin: TD Vaccine          COLORECTAL CANCER SCREENING (COLONOSCOPY - Every 10 Years) Tentatively due on 6/21/2031 06/21/2021  REFERRAL TO GI FOR COLONOSCOPY    06/21/2021  REFERRAL TO GI FOR COLONOSCOPY    03/29/2016  REFERRAL TO GI FOR COLONOSCOPY          IMM PNEUMOCOCCAL VACCINE: 65+ Years (Series Information) Completed    10/30/2015  Imm Admin: Pneumococcal Conjugate Vaccine (Prevnar/PCV-13)    08/31/2015  Imm Admin: Pneumococcal polysaccharide vaccine (PPSV-23)    03/26/2012  Imm Admin: Pneumococcal Conjugate Vaccine (Prevnar/PCV-13)    03/26/2012  Imm Admin: Pneumococcal polysaccharide vaccine (PPSV-23)          HEPATITIS C SCREENING  Addressed    10/01/2019  Reason not specified          IMM ZOSTER VACCINES (Series Information) Completed    08/17/2020  Imm Admin: Zoster Vaccine Recombinant (RZV) (SHINGRIX)    06/17/2020  Imm Admin: Zoster Vaccine Recombinant (RZV) (SHINGRIX)          COVID-19 Vaccine (Series Information) Completed    09/27/2021  Imm Admin: Pfizer SARS-CoV-2 Vaccine 12+ 02/13/2021  Imm Admin: Pfizer SARS-CoV-2 Vaccine    01/23/2021  Imm Admin: Pfizer SARS-CoV-2 Vaccine          IMM INFLUENZA (Series Information) Completed    10/07/2021  Imm Admin: Influenza Vaccine Adult HD    09/14/2020  Imm Admin: Influenza (IM)  "Preservative Free - HISTORICAL DATA    10/24/2019  Imm Admin: Influenza Vaccine Adult HD    10/08/2018  Imm Admin: Influenza Vaccine Quad Inj (Preserved)    10/08/2018  Imm Admin: Influenza Vaccine Adult HD    Only the first 5 history entries have been loaded, but more history exists.          IMM HEP B VACCINE (Series Information) Aged Out    No completion history exists for this topic.          IMM MENINGOCOCCAL VACCINE (MCV4) (Series Information) Aged Out    No completion history exists for this topic.          Discontinued - PAP SMEAR  Discontinued    No completion history exists for this topic.                Patient Care Team:  Fariha Gold D.O. as PCP - General (Internal Medicine)      Social History     Tobacco Use   • Smoking status: Never Smoker   • Smokeless tobacco: Never Used   • Tobacco comment: continued abstinence   Vaping Use   • Vaping Use: Never used   Substance Use Topics   • Alcohol use: Yes     Alcohol/week: 0.0 - 0.6 oz     Comment: \"maybe twice a year\"   • Drug use: No     Family History   Problem Relation Age of Onset   • Cancer Maternal Aunt         breast   • Stroke Mother         TIA   • Heart Disease Father      She  has a past medical history of Atypical chest pain (1/23/2014), Cataract, Family history of coronary artery disease (1/23/2014), Hyperlipidemia, Hypertension, Hypothyroidism, postop, and Pain of both hip joints (1/20/2017). She also has no past medical history of Encounter for long-term (current) use of other medications.   Past Surgical History:   Procedure Laterality Date   • APPENDECTOMY     • LAMINOTOMY      cervical fusion; disc bulge?   • PRIMARY C SECTION      cervix repair and c section   • THYROIDECTOMY     • TONSILLECTOMY     • US-CYST ASPIRATION-BREAST INITIAL Left 1990's         Annual Health Assessment Questions:    1.  Are you currently engaging in any exercise or physical activity? Yes    2.  How would you describe your mood or emotional well-being " "today? good    3.  Have you had any falls in the last year? No    4.  Have you noticed any problems with your balance or had difficulty walking? No    5.  In the last six months have you experienced any leakage of urine? No    6. DPA/Advanced Directive: Patient has Advanced Directive, but it is not on file. Instructed to bring in a copy to scan into their chart.       Exam:   /60 (BP Location: Right arm, Patient Position: Sitting, BP Cuff Size: Adult)   Pulse 62   Temp 36.1 °C (96.9 °F) (Temporal)   Resp 12   Ht 1.562 m (5' 1.5\")   Wt 55.5 kg (122 lb 4.8 oz)   SpO2 97%  Body mass index is 22.73 kg/m².    Physical Exam  Constitutional:       General: She is not in acute distress.     Appearance: Normal appearance. She is normal weight. She is not ill-appearing.   HENT:      Right Ear: There is no impacted cerumen.      Left Ear: There is no impacted cerumen.   Eyes:      General: No scleral icterus.     Conjunctiva/sclera: Conjunctivae normal.   Cardiovascular:      Rate and Rhythm: Normal rate and regular rhythm.      Pulses: Normal pulses.      Heart sounds: No murmur heard.      Pulmonary:      Effort: Pulmonary effort is normal. No respiratory distress.      Breath sounds: Normal breath sounds. No wheezing or rhonchi.   Musculoskeletal:      Right lower leg: No edema.      Left lower leg: No edema.   Lymphadenopathy:      Cervical: No cervical adenopathy.   Skin:     General: Skin is warm and dry.      Findings: No bruising or rash.   Psychiatric:         Mood and Affect: Mood normal.         Behavior: Behavior normal.         Thought Content: Thought content normal.         Judgment: Judgment normal.          Assessment and Plan.     Neli is a 76 y.o. female with the following:  Problem List Items Addressed This Visit     Hyperlipidemia     Chronic and ongoing problem, triglycerides continue to be elevated despite her healthy dietary choices. Continue lovastatin which has helped with total and " LDL, HDL still runs a little low.         Relevant Medications    triamterene-hctz (MAXZIDE-25/DYAZIDE) 37.5-25 MG Tab    Acquired hypothyroidism     Chronic and ongoing problem, continue levothyroxine 50 mcg daily.         Benign hypertension     Chronic and ongoing problem, continue losartan, triamterene-hctz, and metoprolol succinate. Labs are stable.         Relevant Medications    triamterene-hctz (MAXZIDE-25/DYAZIDE) 37.5-25 MG Tab    Prediabetes     Chronic and ongoing problem, A1c has normalized. Continue with periodic follow up.         Menopause    Relevant Orders    DS-BONE DENSITY STUDY (DEXA)      Other Visit Diagnoses     Encounter for subsequent annual wellness visit (AWV) in Medicare patient    -  Primary           Services suggested: No services needed at this time  Health Care Screening: Age-appropriate preventive services recommended by USPTF and ACIP covered by Medicare were discussed today. Services ordered if indicated and agreed upon by the patient.  Referrals offered: Community-based lifestyle interventions to reduce health risks and promote self-management and wellness, fall prevention, nutrition, physical activity, tobacco-use cessation, weight loss, and mental health services as per orders if indicated.    Discussion today about general wellness and lifestyle habits:    · Prevent falls and reduce trip hazards; Cautioned about securing or removing rugs.  · Have a working fire alarm and carbon monoxide detector;   · Engage in regular physical activity and social activities     Follow-up: Return in about 6 months (around 6/16/2022).    I spent a total of 37 minutes with record review, exam, communication with the patient, communication with other providers, and documentation of this encounter.       PLEASE NOTE: This dictation was created using voice recognition software. I have made every reasonable attempt to correct obvious errors, but I expect that there are errors of grammar and possibly  content that I did not discover before finalizing the note.      Fariha Gold, DO  Geriatric and Internal Medicine  [facility]

## 2021-12-16 NOTE — ASSESSMENT & PLAN NOTE
Chronic and ongoing problem, triglycerides continue to be elevated despite her healthy dietary choices. Continue lovastatin which has helped with total and LDL, HDL still runs a little low.

## 2021-12-16 NOTE — ASSESSMENT & PLAN NOTE
Chronic and ongoing problem, continue losartan, triamterene-hctz, and metoprolol succinate. Labs are stable.

## 2021-12-16 NOTE — PROGRESS NOTES
"Subjective:   Chief Complaint/History of Present Illness:  Neli Hernandez is a 76 y.o. female established patient who presents today to discuss medical problems as listed below. Neli is accompanied by ***.    No problems updated.     Current Medications:  Current Outpatient Medications Ordered in Epic   Medication Sig Dispense Refill   • losartan (COZAAR) 100 MG Tab Take 1 Tablet by mouth every day. 100 Tablet 3   • triamterene-hctz (MAXZIDE-25/DYAZIDE) 37.5-25 MG Tab TAKE 1 TABLET BY MOUTH EVERY DAY 30 Tablet 3   • metoprolol SR (TOPROL XL) 25 MG TABLET SR 24 HR Take 1 Tablet by mouth every day. 100 Tablet 3   • lovastatin (MEVACOR) 40 MG tablet Take 1 Tablet by mouth every day. 100 Tablet 3   • Cholecalciferol (VITAMIN D3) 2000 UNIT Cap Take 1 Capsule by mouth every day. 100 Capsule 3   • levothyroxine (LEVO-T) 50 MCG Tab Take once daily, Tuesday and Thursday take two daily 124 Tablet 3   • Omega-3 Fatty Acids (FISH OIL) 1000 MG CAPSULE DELAYED RELEASE EC softgel capsule Take 2,000 mg by mouth every morning with breakfast.     • Magnesium 250 MG Tab        No current Epic-ordered facility-administered medications on file.          Objective:   Physical Exam:    Vitals: Pulse 62   Temp 36.1 °C (96.9 °F) (Temporal)   Resp 12   Ht 1.562 m (5' 1.5\")   Wt 55.5 kg (122 lb 4.8 oz)   SpO2 97%    BMI: Body mass index is 22.73 kg/m².  Physical Exam          Assessment and Plan:   Neli is a 76 y.o. female with the following:  Problem List Items Addressed This Visit     None             Annual Health Assessment Questions:    1.  Are you currently engaging in any exercise or physical activity? Yes    2.  How would you describe your mood or emotional well-being today? good    3.  Have you had any falls in the last year? No    4.  Have you noticed any problems with your balance or had difficulty walking? No    5.  In the last six months have you experienced any leakage of urine? No    6. DPA/Advanced Directive: " Patient has Advanced Directive, but it is not on file. Instructed to bring in a copy to scan into their chart.       RTC: No follow-ups on file.    I spent a total of *** minutes with record review, exam, communication with the patient, communication with other providers, and documentation of this encounter.    PLEASE NOTE: This dictation was created using voice recognition software. I have made every reasonable attempt to correct obvious errors, but I expect that there are errors of grammar and possibly content that I did not discover before finalizing the note.      Fariha Gold, DO  Geriatric and Internal Medicine  [facility]

## 2022-06-06 ENCOUNTER — HOSPITAL ENCOUNTER (OUTPATIENT)
Dept: RADIOLOGY | Facility: MEDICAL CENTER | Age: 77
End: 2022-06-06
Attending: INTERNAL MEDICINE
Payer: MEDICARE

## 2022-06-06 ENCOUNTER — TELEPHONE (OUTPATIENT)
Dept: MEDICAL GROUP | Facility: PHYSICIAN GROUP | Age: 77
End: 2022-06-06
Payer: MEDICARE

## 2022-06-06 DIAGNOSIS — Z78.0 MENOPAUSE: ICD-10-CM

## 2022-06-06 DIAGNOSIS — E03.9 ACQUIRED HYPOTHYROIDISM: ICD-10-CM

## 2022-06-06 DIAGNOSIS — R73.03 PREDIABETES: ICD-10-CM

## 2022-06-06 DIAGNOSIS — E78.2 MIXED HYPERLIPIDEMIA: ICD-10-CM

## 2022-06-06 PROCEDURE — 77080 DXA BONE DENSITY AXIAL: CPT

## 2022-06-06 NOTE — TELEPHONE ENCOUNTER
1. Caller Name: Neli Hernandez                          Call Back Number: [phone] (home)         How would the patient prefer to be contacted with a response: Phone call OK to leave a detailed message    Can you order labs for her for upcoming appt  6/16/22?

## 2022-06-07 ENCOUNTER — HOSPITAL ENCOUNTER (OUTPATIENT)
Dept: LAB | Facility: MEDICAL CENTER | Age: 77
End: 2022-06-07
Attending: INTERNAL MEDICINE
Payer: MEDICARE

## 2022-06-07 DIAGNOSIS — E03.9 ACQUIRED HYPOTHYROIDISM: ICD-10-CM

## 2022-06-07 DIAGNOSIS — E78.2 MIXED HYPERLIPIDEMIA: ICD-10-CM

## 2022-06-07 DIAGNOSIS — R73.03 PREDIABETES: ICD-10-CM

## 2022-06-07 LAB
25(OH)D3 SERPL-MCNC: 69 NG/ML (ref 30–100)
ALBUMIN SERPL BCP-MCNC: 4.5 G/DL (ref 3.2–4.9)
ALBUMIN/GLOB SERPL: 1.4 G/DL
ALP SERPL-CCNC: 100 U/L (ref 30–99)
ALT SERPL-CCNC: 18 U/L (ref 2–50)
ANION GAP SERPL CALC-SCNC: 13 MMOL/L (ref 7–16)
AST SERPL-CCNC: 18 U/L (ref 12–45)
BASOPHILS # BLD AUTO: 1.1 % (ref 0–1.8)
BASOPHILS # BLD: 0.08 K/UL (ref 0–0.12)
BILIRUB SERPL-MCNC: 0.4 MG/DL (ref 0.1–1.5)
BUN SERPL-MCNC: 29 MG/DL (ref 8–22)
CALCIUM SERPL-MCNC: 9.7 MG/DL (ref 8.5–10.5)
CHLORIDE SERPL-SCNC: 101 MMOL/L (ref 96–112)
CHOLEST SERPL-MCNC: 190 MG/DL (ref 100–199)
CO2 SERPL-SCNC: 24 MMOL/L (ref 20–33)
CREAT SERPL-MCNC: 0.88 MG/DL (ref 0.5–1.4)
EOSINOPHIL # BLD AUTO: 0.26 K/UL (ref 0–0.51)
EOSINOPHIL NFR BLD: 3.5 % (ref 0–6.9)
ERYTHROCYTE [DISTWIDTH] IN BLOOD BY AUTOMATED COUNT: 42.9 FL (ref 35.9–50)
EST. AVERAGE GLUCOSE BLD GHB EST-MCNC: 114 MG/DL
FASTING STATUS PATIENT QL REPORTED: NORMAL
GFR SERPLBLD CREATININE-BSD FMLA CKD-EPI: 68 ML/MIN/1.73 M 2
GLOBULIN SER CALC-MCNC: 3.3 G/DL (ref 1.9–3.5)
GLUCOSE SERPL-MCNC: 99 MG/DL (ref 65–99)
HBA1C MFR BLD: 5.6 % (ref 4–5.6)
HCT VFR BLD AUTO: 43.6 % (ref 37–47)
HDLC SERPL-MCNC: 35 MG/DL
HGB BLD-MCNC: 14.7 G/DL (ref 12–16)
IMM GRANULOCYTES # BLD AUTO: 0.05 K/UL (ref 0–0.11)
IMM GRANULOCYTES NFR BLD AUTO: 0.7 % (ref 0–0.9)
LDLC SERPL CALC-MCNC: 89 MG/DL
LYMPHOCYTES # BLD AUTO: 2.02 K/UL (ref 1–4.8)
LYMPHOCYTES NFR BLD: 26.9 % (ref 22–41)
MCH RBC QN AUTO: 31.1 PG (ref 27–33)
MCHC RBC AUTO-ENTMCNC: 33.7 G/DL (ref 33.6–35)
MCV RBC AUTO: 92.4 FL (ref 81.4–97.8)
MONOCYTES # BLD AUTO: 0.67 K/UL (ref 0–0.85)
MONOCYTES NFR BLD AUTO: 8.9 % (ref 0–13.4)
NEUTROPHILS # BLD AUTO: 4.42 K/UL (ref 2–7.15)
NEUTROPHILS NFR BLD: 58.9 % (ref 44–72)
NRBC # BLD AUTO: 0 K/UL
NRBC BLD-RTO: 0 /100 WBC
PLATELET # BLD AUTO: 300 K/UL (ref 164–446)
PMV BLD AUTO: 10.8 FL (ref 9–12.9)
POTASSIUM SERPL-SCNC: 3.7 MMOL/L (ref 3.6–5.5)
PROT SERPL-MCNC: 7.8 G/DL (ref 6–8.2)
RBC # BLD AUTO: 4.72 M/UL (ref 4.2–5.4)
SODIUM SERPL-SCNC: 138 MMOL/L (ref 135–145)
T4 FREE SERPL-MCNC: 1.55 NG/DL (ref 0.93–1.7)
TRIGL SERPL-MCNC: 328 MG/DL (ref 0–149)
TSH SERPL DL<=0.005 MIU/L-ACNC: 1.95 UIU/ML (ref 0.38–5.33)
VIT B12 SERPL-MCNC: 576 PG/ML (ref 211–911)
WBC # BLD AUTO: 7.5 K/UL (ref 4.8–10.8)

## 2022-06-07 PROCEDURE — 83036 HEMOGLOBIN GLYCOSYLATED A1C: CPT

## 2022-06-07 PROCEDURE — 84439 ASSAY OF FREE THYROXINE: CPT

## 2022-06-07 PROCEDURE — 80053 COMPREHEN METABOLIC PANEL: CPT

## 2022-06-07 PROCEDURE — 82306 VITAMIN D 25 HYDROXY: CPT

## 2022-06-07 PROCEDURE — 80061 LIPID PANEL: CPT

## 2022-06-07 PROCEDURE — 85025 COMPLETE CBC W/AUTO DIFF WBC: CPT

## 2022-06-07 PROCEDURE — 82607 VITAMIN B-12: CPT

## 2022-06-07 PROCEDURE — 84443 ASSAY THYROID STIM HORMONE: CPT

## 2022-06-07 PROCEDURE — 36415 COLL VENOUS BLD VENIPUNCTURE: CPT

## 2022-06-14 ENCOUNTER — TELEPHONE (OUTPATIENT)
Dept: MEDICAL GROUP | Facility: PHYSICIAN GROUP | Age: 77
End: 2022-06-14
Payer: MEDICARE

## 2022-06-14 NOTE — TELEPHONE ENCOUNTER
ESTABLISHED PATIENT PRE-VISIT PLANNING     Patient was NOT contacted to complete PVP.     Note: Patient will not be contacted if there is no indication to call.     1.  Reviewed notes from the last few office visits within the medical group: Yes    2.  If any orders were placed at last visit or intended to be done for this visit (i.e. 6 mos follow-up), do we have Results/Consult Notes?         •  Labs - Labs were not ordered at last office visit.  Note: If patient appointment is for lab review and patient did not complete labs, check with provider if OK to reschedule patient until labs completed.       •  Imaging - Imaging ordered, completed and results are in chart.       •  Referrals - No referrals were ordered at last office visit.    3. Is this appointment scheduled as a Hospital Follow-Up? No    4.  Immunizations were updated in Epic using Reconcile Outside Information activity? Yes    5.  Patient is due for the following Health Maintenance Topics:   There are no preventive care reminders to display for this patient.      6.  AHA (Pulse8) form printed for Provider? Yes

## 2022-06-16 ENCOUNTER — OFFICE VISIT (OUTPATIENT)
Dept: MEDICAL GROUP | Facility: PHYSICIAN GROUP | Age: 77
End: 2022-06-16
Payer: MEDICARE

## 2022-06-16 VITALS
RESPIRATION RATE: 12 BRPM | SYSTOLIC BLOOD PRESSURE: 128 MMHG | DIASTOLIC BLOOD PRESSURE: 62 MMHG | OXYGEN SATURATION: 96 % | BODY MASS INDEX: 22.31 KG/M2 | HEART RATE: 71 BPM | TEMPERATURE: 97.5 F | WEIGHT: 121.2 LBS | HEIGHT: 62 IN

## 2022-06-16 DIAGNOSIS — E03.9 ACQUIRED HYPOTHYROIDISM: ICD-10-CM

## 2022-06-16 DIAGNOSIS — I10 BENIGN HYPERTENSION: ICD-10-CM

## 2022-06-16 DIAGNOSIS — E78.5 HYPERLIPIDEMIA, UNSPECIFIED HYPERLIPIDEMIA TYPE: ICD-10-CM

## 2022-06-16 DIAGNOSIS — M85.89 OSTEOPENIA OF MULTIPLE SITES: ICD-10-CM

## 2022-06-16 PROBLEM — R73.03 PREDIABETES: Status: RESOLVED | Noted: 2019-07-15 | Resolved: 2022-06-16

## 2022-06-16 PROCEDURE — 99214 OFFICE O/P EST MOD 30 MIN: CPT | Performed by: INTERNAL MEDICINE

## 2022-06-16 RX ORDER — METOPROLOL SUCCINATE 25 MG/1
25 TABLET, EXTENDED RELEASE ORAL DAILY
Qty: 100 TABLET | Refills: 3 | Status: SHIPPED | OUTPATIENT
Start: 2022-06-16 | End: 2022-07-05

## 2022-06-16 RX ORDER — LOVASTATIN 40 MG/1
40 TABLET ORAL DAILY
Qty: 100 TABLET | Refills: 3 | Status: SHIPPED
Start: 2022-06-16 | End: 2023-03-02

## 2022-06-16 RX ORDER — LEVOTHYROXINE SODIUM 0.05 MG/1
TABLET ORAL
Qty: 124 TABLET | Refills: 3 | Status: SHIPPED | OUTPATIENT
Start: 2022-06-16 | End: 2022-08-29

## 2022-06-16 ASSESSMENT — FIBROSIS 4 INDEX: FIB4 SCORE: 1.060660171779821287

## 2022-06-16 ASSESSMENT — PATIENT HEALTH QUESTIONNAIRE - PHQ9: CLINICAL INTERPRETATION OF PHQ2 SCORE: 0

## 2022-06-16 NOTE — ASSESSMENT & PLAN NOTE
Chronic and stable problem, thyroid levels are appropriate, she is on low dose biotin which may cause elevated FT4. No changes to medicine at this time, continue levothyroxine 50 mcg 5 days per week and 100 mcg on Tues/Thurs.

## 2022-06-16 NOTE — PROGRESS NOTES
Subjective:   Chief Complaint/History of Present Illness:  Neli Hernandez is a 76 y.o. female established patient who presents today to discuss medical problems as listed below. Neli is unaccompanied for today's visit.    Problem   Benign Hypertension    She has a history of hypertension since her late 50's on monotherapy with Losartan.      Her blood pressure tends to run higher when she is at the doctor due to white coat hypertension. Her heart rate at home tends to run in the 60's and she has a wrist BP cuff but does not usually check it.  She had recent decompensation of her blood pressure in early summer 2021.  Her blood pressure will run as high as the 170s systolic but often not below the 140s systolic.  She also has some associated palpitations with heart rate up to the low 100s.    No headaches, chest pain, or dyspnea on exertion.    BP in clinic ranging 138/62    Current regimen: losartan 100 mg daily, triamterene-hctz 37.5-25 mg daily and metoprolol succinate 25 mg daily     Osteopenia of Multiple Sites    Bone Density (6/2022):  lumbar spine T score of -1.1 (-1.7 in 2020)   proximal left femur T score of -1.7 (-1.6 in 2020)    When compared with the most recent study dated 2/11/2020, there has been a 7.9% increase in the bone mineral density of the lumbar spine and a 2.1% decrease in the bone mineral density of the proximal left femur.     10-year Probability of Fracture:  Major Osteoporotic     17.2%  Hip     3.9%    Osteopenia noted back in 2012, she is on calcium and vitamin D since that time. Prior history of wrist fracture.     Acquired Hypothyroidism       Latest Reference Range & Units 06/07/22 13:37   TSH 0.380 - 5.330 uIU/mL 1.950   Free T-4 0.93 - 1.70 ng/dL 1.55     Maintained on levothyroxine 50 mcg for many years, well controlled. She previously followed with endocrinology, Dr. Win. She denies any fluctuations or changes to her dose for quite some time. She denies any signs or  "symptoms of over treatment or under treatment at this time.    Current regimen: levothyroxine 50 mcg 5 days per week and 100 mcg on Tues/Thurs     Hyperlipidemia     Latest Reference Range & Units 06/07/22 13:37   Cholesterol,Tot 100 - 199 mg/dL 190   Triglycerides 0 - 149 mg/dL 328 (H)   HDL >=40 mg/dL 35 !   LDL <100 mg/dL 89     She reports using lovastatin since her 60's. It was started due to elevated cholesterol values, she denies any history of cardiovascular or cerebrovascular disease.     She reports that her triglycerides have never been as high as they are now. No history of pancreatitis. She has never been on fenofibrate medications.      Current regimen: Lovastatin 40 mg daily       Current Medications:  Current Outpatient Medications Ordered in Epic   Medication Sig Dispense Refill   • NON SPECIFIED Phytoceramiides 320 mg and 500 mg Bioton, evening     • metoprolol SR (TOPROL XL) 25 MG TABLET SR 24 HR Take 1 Tablet by mouth every day. 100 Tablet 3   • lovastatin (MEVACOR) 40 MG tablet Take 1 Tablet by mouth every day. 100 Tablet 3   • levothyroxine (LEVO-T) 50 MCG Tab Take once daily, Tuesday and Thursday take two daily 124 Tablet 3   • losartan (COZAAR) 100 MG Tab Take 1 Tablet by mouth every day. 100 Tablet 3   • triamterene-hctz (MAXZIDE-25/DYAZIDE) 37.5-25 MG Tab Take 1 Tablet by mouth every day. Non child proof cap 100 Tablet 3   • Cholecalciferol (VITAMIN D3) 2000 UNIT Cap Take 1 Capsule by mouth every day. 100 Capsule 3     No current [facility]-ordered facility-administered medications on file.          Objective:   Physical Exam:    Vitals: /62 (BP Location: Left arm, Patient Position: Sitting, BP Cuff Size: Adult)   Pulse 71   Temp 36.4 °C (97.5 °F) (Temporal)   Resp 12   Ht 1.562 m (5' 1.5\")   Wt 55 kg (121 lb 3.2 oz)   SpO2 96%    BMI: Body mass index is 22.53 kg/m².  Physical Exam  Constitutional:       General: She is not in acute distress.     Appearance: Normal appearance. She is " not ill-appearing.   HENT:      Right Ear: There is no impacted cerumen.      Left Ear: There is no impacted cerumen.   Eyes:      General: No scleral icterus.     Conjunctiva/sclera: Conjunctivae normal.   Neck:      Vascular: No carotid bruit.   Cardiovascular:      Rate and Rhythm: Normal rate and regular rhythm.      Pulses: Normal pulses.   Pulmonary:      Effort: Pulmonary effort is normal. No respiratory distress.      Breath sounds: Normal breath sounds. No wheezing or rhonchi.   Abdominal:      General: Bowel sounds are normal.      Palpations: Abdomen is soft.   Musculoskeletal:      Right lower leg: No edema.      Left lower leg: No edema.   Skin:     General: Skin is warm and dry.      Findings: No rash.   Psychiatric:         Mood and Affect: Mood normal.         Behavior: Behavior normal.         Thought Content: Thought content normal.         Judgment: Judgment normal.          Assessment and Plan:   Neli is a 76 y.o. female with the following:  Problem List Items Addressed This Visit     Hyperlipidemia     Chronic and ongoing problem, discussed dietary modifications to assist with triglycerides. No history of pancreatitis, discussed increased risk if values would trend above 500. Could add fibrate medicine in the future. Continue lovastatin at this time, total cholesterol and LDL are at goal.           Relevant Medications    metoprolol SR (TOPROL XL) 25 MG TABLET SR 24 HR    lovastatin (MEVACOR) 40 MG tablet    Acquired hypothyroidism     Chronic and stable problem, thyroid levels are appropriate, she is on low dose biotin which may cause elevated FT4. No changes to medicine at this time, continue levothyroxine 50 mcg 5 days per week and 100 mcg on Tues/Thurs.           Relevant Medications    levothyroxine (LEVO-T) 50 MCG Tab    Benign hypertension     Chronic and ongoing problem, continue current regimen with triamterene-hctz, metoprolol succinate, and losartan. Electrolytes show low-normal  potassium, could consider OTC or prescription supplementation if she would develop leg cramps or muscle weakness.           Relevant Medications    metoprolol SR (TOPROL XL) 25 MG TABLET SR 24 HR    lovastatin (MEVACOR) 40 MG tablet    Osteopenia of multiple sites     Chronic and ongoing problem, she has osteopenia in the spine and left hip, doing well on calcium and vitamin D and using a vibration device. She remains very active. Recheck again in 2 years, her FRAX elevation is mild and not validated above age 76 so will monitor for another 2 years and then reassess for antiresorptive therapy.                    Annual Health Assessment Questions:    1.  Are you currently engaging in any exercise or physical activity? Yes   walks home exercise  5500 steps a day   2.  How would you describe your mood or emotional well-being today? good    3.  Have you had any falls in the last year? No    4.  Have you noticed any problems with your balance or had difficulty walking? No    5.  In the last six months have you experienced any leakage of urine? No    6. DPA/Advanced Directive: Patient has Advanced Directive, but it is not on file. Instructed to bring in a copy to scan into their chart.       RTC: Return in about 6 months (around 12/16/2022).    I spent a total of 38 minutes with record review, exam, communication with the patient, communication with other providers, and documentation of this encounter.    PLEASE NOTE: This dictation was created using voice recognition software. I have made every reasonable attempt to correct obvious errors, but I expect that there are errors of grammar and possibly content that I did not discover before finalizing the note.      Fariha Gold, DO  Geriatric and Internal Medicine  [facility]

## 2022-06-16 NOTE — ASSESSMENT & PLAN NOTE
Chronic and ongoing problem, she has osteopenia in the spine and left hip, doing well on calcium and vitamin D and using a vibration device. She remains very active. Recheck again in 2 years, her FRAX elevation is mild and not validated above age 76 so will monitor for another 2 years and then reassess for antiresorptive therapy.

## 2022-06-16 NOTE — ASSESSMENT & PLAN NOTE
Chronic and ongoing problem, continue current regimen with triamterene-hctz, metoprolol succinate, and losartan. Electrolytes show low-normal potassium, could consider OTC or prescription supplementation if she would develop leg cramps or muscle weakness.

## 2022-06-16 NOTE — ASSESSMENT & PLAN NOTE
Chronic and ongoing problem, discussed dietary modifications to assist with triglycerides. No history of pancreatitis, discussed increased risk if values would trend above 500. Could add fibrate medicine in the future. Continue lovastatin at this time, total cholesterol and LDL are at goal.

## 2022-08-29 DIAGNOSIS — E03.9 ACQUIRED HYPOTHYROIDISM: ICD-10-CM

## 2022-08-29 RX ORDER — LEVOTHYROXINE SODIUM 0.05 MG/1
TABLET ORAL
Qty: 124 TABLET | Refills: 0 | Status: SHIPPED | OUTPATIENT
Start: 2022-08-29 | End: 2022-11-23 | Stop reason: SDUPTHER

## 2022-11-14 DIAGNOSIS — E03.9 ACQUIRED HYPOTHYROIDISM: ICD-10-CM

## 2022-11-14 DIAGNOSIS — E78.2 MIXED HYPERLIPIDEMIA: ICD-10-CM

## 2022-11-23 DIAGNOSIS — E03.9 ACQUIRED HYPOTHYROIDISM: ICD-10-CM

## 2022-11-23 RX ORDER — LEVOTHYROXINE SODIUM 0.05 MG/1
TABLET ORAL
Qty: 124 TABLET | Refills: 0 | Status: SHIPPED | OUTPATIENT
Start: 2022-11-23 | End: 2022-11-30 | Stop reason: SDUPTHER

## 2022-11-30 DIAGNOSIS — E03.9 ACQUIRED HYPOTHYROIDISM: ICD-10-CM

## 2022-11-30 RX ORDER — LEVOTHYROXINE SODIUM 0.05 MG/1
TABLET ORAL
Qty: 124 TABLET | Refills: 0 | Status: SHIPPED | OUTPATIENT
Start: 2022-11-30 | End: 2023-02-24

## 2022-11-30 NOTE — TELEPHONE ENCOUNTER
Received request via: Pharmacy    Was the patient seen in the last year in this department? Yes    Does the patient have an active prescription (recently filled or refills available) for medication(s) requested? No    Does the patient have [facility] and need 100 day supply (blood pressure, diabetes and cholesterol meds only)? Yes, quantity updated to 100 days

## 2022-11-30 NOTE — TELEPHONE ENCOUNTER
Requested Prescriptions     Pending Prescriptions Disp Refills   • levothyroxine (SYNTHROID) 50 MCG Tab 124 Tablet 0     Sig: TAKE ONE TABLET BY MOUTH DAILY, TUESDAY AND THURSDAY TAKE TWO TABLETS DAILY   Linsey Perez M.D.

## 2022-12-05 DIAGNOSIS — I10 BENIGN HYPERTENSION: ICD-10-CM

## 2022-12-05 RX ORDER — LOSARTAN POTASSIUM 100 MG/1
TABLET ORAL
Qty: 100 TABLET | Refills: 0 | Status: SHIPPED | OUTPATIENT
Start: 2022-12-05 | End: 2023-03-06

## 2022-12-20 ENCOUNTER — OFFICE VISIT (OUTPATIENT)
Dept: MEDICAL GROUP | Facility: PHYSICIAN GROUP | Age: 77
End: 2022-12-20
Payer: MEDICARE

## 2022-12-20 VITALS
BODY MASS INDEX: 22.82 KG/M2 | HEART RATE: 65 BPM | DIASTOLIC BLOOD PRESSURE: 62 MMHG | WEIGHT: 124 LBS | OXYGEN SATURATION: 96 % | HEIGHT: 62 IN | SYSTOLIC BLOOD PRESSURE: 124 MMHG | RESPIRATION RATE: 12 BRPM | TEMPERATURE: 97.3 F

## 2022-12-20 DIAGNOSIS — E03.9 ACQUIRED HYPOTHYROIDISM: ICD-10-CM

## 2022-12-20 DIAGNOSIS — M79.675 PAIN OF LEFT GREAT TOE: ICD-10-CM

## 2022-12-20 DIAGNOSIS — R19.4 CHANGE IN STOOL HABITS: ICD-10-CM

## 2022-12-20 DIAGNOSIS — I10 BENIGN HYPERTENSION: ICD-10-CM

## 2022-12-20 DIAGNOSIS — E78.2 MIXED HYPERLIPIDEMIA: ICD-10-CM

## 2022-12-20 PROBLEM — R94.30 NONSPECIFIC ABNORMAL FUNCTION STUDY, CARDIOVASCULAR: Status: ACTIVE | Noted: 2022-12-20

## 2022-12-20 PROBLEM — R94.4 DECREASED GFR: Status: ACTIVE | Noted: 2022-12-20

## 2022-12-20 PROCEDURE — 99214 OFFICE O/P EST MOD 30 MIN: CPT | Performed by: INTERNAL MEDICINE

## 2022-12-20 RX ORDER — TRIAMTERENE AND HYDROCHLOROTHIAZIDE 37.5; 25 MG/1; MG/1
1 TABLET ORAL DAILY
Qty: 100 TABLET | Refills: 3 | Status: SHIPPED | OUTPATIENT
Start: 2022-12-20 | End: 2023-11-27

## 2022-12-20 ASSESSMENT — FIBROSIS 4 INDEX: FIB4 SCORE: 1.07

## 2022-12-20 NOTE — PROGRESS NOTES
Subjective:   Chief Complaint/History of Present Illness:  Neli Hernandez is a 77 y.o. female established patient who presents today to discuss medical problems as listed below. Neli is unaccompanied for today's visit.    Problem   Pain of Left Great Toe    She reports left toe pain with associated erythema and swelling last week, no warmth noted. Occurred at base of first metatarsal on the left foot, improved after a few days. No recent uric acid level. Eats a healthy diet with limited animal products.     Change in Stool Habits    She reports her BM's have slowly changed over recent time. She will have soft stools 3-4 times a day and then over a period of days it will transition into diarrhea and then reset. This cycles about every 7-10 days. No changes in eating habits. Thyroid has been well controlled. No history of Hpylori, SIBO, or Celiac disease. No concerns on colonoscopy in June 2021. No associated weight loss.     Benign Hypertension    She has a history of hypertension since her late 50's.    Her blood pressure tends to run higher when she is at the doctor due to white coat hypertension. Her heart rate at home tends to run in the 60's and she has a wrist BP cuff but does not usually check it.      She had decompensation of her blood pressure in early summer 2021.  Her blood pressure will run as high as the 170s systolic but often not below the 140s systolic.  She also has some associated palpitations with heart rate up to the low 100s. Additional pharmacotherapy was added with good result.      BP in clinic ranging 124-128/60-62    Current regimen: losartan 100 mg daily, triamterene-hctz 37.5-25 mg daily and metoprolol succinate 25 mg daily     Acquired Hypothyroidism     Latest Reference Range & Units 06/07/22 13:37   TSH 0.380 - 5.330 uIU/mL 1.950   Free T-4 0.93 - 1.70 ng/dL 1.55     Maintained on levothyroxine 50 mcg for many years, well controlled. She previously followed with endocrinology,  Dr. Win. She denies any fluctuations or changes to her dose for quite some time. She denies any signs or symptoms of over treatment or under treatment at this time.    Current regimen: levothyroxine 50 mcg 5 days per week and 100 mcg on Tues/Thurs     Hyperlipidemia     Latest Reference Range & Units 06/07/22 13:37   Cholesterol,Tot 100 - 199 mg/dL 190   Triglycerides 0 - 149 mg/dL 328 (H)   HDL >=40 mg/dL 35 !   LDL <100 mg/dL 89     She reports using lovastatin since her 60's. It was started due to elevated cholesterol values, she denies any history of cardiovascular or cerebrovascular disease.     She reports that her triglycerides have never been as high as they are now. No history of pancreatitis. She has never been on fenofibrate medications.      Current regimen: Lovastatin 40 mg daily          Current Medications:  Current Outpatient Medications Ordered in Epic   Medication Sig Dispense Refill    Multiple Vitamins-Minerals (PRESERVISION AREDS PO) Take  by mouth.      triamterene-hctz (MAXZIDE-25/DYAZIDE) 37.5-25 MG Tab Take 1 Tablet by mouth every day. Non child proof cap 100 Tablet 3    losartan (COZAAR) 100 MG Tab TAKE ONE TABLET BY MOUTH EVERY  Tablet 0    levothyroxine (SYNTHROID) 50 MCG Tab TAKE ONE TABLET BY MOUTH DAILY, TUESDAY AND THURSDAY TAKE TWO TABLETS DAILY 124 Tablet 0    metoprolol SR (TOPROL XL) 25 MG TABLET SR 24 HR TAKE ONE TABLET BY MOUTH EVERY  Tablet 3    NON SPECIFIED Phytoceramiides 320 mg and 500 mg Bioton, evening      lovastatin (MEVACOR) 40 MG tablet Take 1 Tablet by mouth every day. 100 Tablet 3    Cholecalciferol (VITAMIN D3) 2000 UNIT Cap Take 1 Capsule by mouth every day. 100 Capsule 3     No current [facility]-ordered facility-administered medications on file.          Objective:   Physical Exam:    Vitals: /62 (BP Location: Left arm, Patient Position: Sitting, BP Cuff Size: Adult)   Pulse 65   Temp 36.3 °C (97.3 °F) (Temporal)   Resp 12   Ht 1.562 m (5'  "1.5\")   Wt 56.2 kg (124 lb)   SpO2 96%    BMI: Body mass index is 23.05 kg/m².  Physical Exam  Constitutional:       General: She is not in acute distress.     Appearance: Normal appearance. She is not ill-appearing.   HENT:      Right Ear: There is no impacted cerumen.      Left Ear: There is no impacted cerumen.   Cardiovascular:      Rate and Rhythm: Normal rate and regular rhythm.      Pulses: Normal pulses.   Pulmonary:      Effort: Pulmonary effort is normal. No respiratory distress.      Breath sounds: No wheezing, rhonchi or rales.   Abdominal:      Palpations: Abdomen is soft.      Tenderness: There is no abdominal tenderness.   Musculoskeletal:      Right lower leg: No edema.      Left lower leg: No edema.   Skin:     General: Skin is warm and dry.      Findings: No bruising or rash.      Comments: Bandage over left superior shoulder from recent skin excision.   Psychiatric:         Mood and Affect: Mood normal.         Behavior: Behavior normal.         Thought Content: Thought content normal.         Judgment: Judgment normal.             Assessment and Plan:   Neli is a 77 y.o. female with the following:  Problem List Items Addressed This Visit       Hyperlipidemia     Chronic and ongoing problem, update lipid panel, continue lvoastatin 40 mg daily, may need to add fibrate if TRG remain elevated.         Relevant Medications    triamterene-hctz (MAXZIDE-25/DYAZIDE) 37.5-25 MG Tab    Acquired hypothyroidism     Chronic and stable problem.  We will update lab levels to ensure stability.  Continue current regimen of levothyroxine 50 mcg 5 days/week and 100 mcg on Tuesday and Thursday.  No signs or symptoms of overtreatment orunder treatment at this time.         Benign hypertension     Chronic and ongoing problem, blood pressure remains at goal, continue current regiment with losartan 100 mg daily, triamterene-hydrochlorothiazide 37.5-25 mg daily, and metoprolol succinate 25 mg daily.  Continue " follow-up with cardiology as recommended.  Update lab work to ensure stability with kidneys and electrolytes.         Relevant Medications    triamterene-hctz (MAXZIDE-25/DYAZIDE) 37.5-25 MG Tab    Pain of left great toe     New problem, will check uric acid level to see if she is at risk for gout. Could consider xray imaging to evaluate for osteoarthritis. Her mother had OA and RA so could also consider additional lab evaluation of she would develop joint swelling and worsening arthritis.         Relevant Orders    URIC ACID    Change in stool habits     New problem, unclear etiology, update lab work and could consider additional evaluation with GI if severity worsens and etiology remains unclear. Consider Celiac, SIBO, Hpylori, thyroid disease, etc at possible contributors.               RTC: Return in about 6 months (around 6/20/2023).    I spent a total of 34 minutes with record review, exam, communication with the patient, communication with other providers, and documentation of this encounter.    PLEASE NOTE: This dictation was created using voice recognition software. I have made every reasonable attempt to correct obvious errors, but I expect that there are errors of grammar and possibly content that I did not discover before finalizing the note.      Fariha Gold, DO  Geriatric and Internal Medicine  [facility]

## 2022-12-20 NOTE — ASSESSMENT & PLAN NOTE
New problem, will check uric acid level to see if she is at risk for gout. Could consider xray imaging to evaluate for osteoarthritis. Her mother had OA and RA so could also consider additional lab evaluation of she would develop joint swelling and worsening arthritis.

## 2022-12-20 NOTE — ASSESSMENT & PLAN NOTE
Chronic and stable problem.  We will update lab levels to ensure stability.  Continue current regimen of levothyroxine 50 mcg 5 days/week and 100 mcg on Tuesday and Thursday.  No signs or symptoms of overtreatment orunder treatment at this time.

## 2022-12-20 NOTE — ASSESSMENT & PLAN NOTE
Chronic and ongoing problem, blood pressure remains at goal, continue current regiment with losartan 100 mg daily, triamterene-hydrochlorothiazide 37.5-25 mg daily, and metoprolol succinate 25 mg daily.  Continue follow-up with cardiology as recommended.  Update lab work to ensure stability with kidneys and electrolytes.

## 2022-12-20 NOTE — ASSESSMENT & PLAN NOTE
Chronic and ongoing problem, update lipid panel, continue lvoastatin 40 mg daily, may need to add fibrate if TRG remain elevated.

## 2022-12-20 NOTE — LETTER
[facility]  Fariha Gold D.O.  [address]  [place] [address]  Fax: [phone]   Authorization for Release/Disclosure of   Protected Health Information   Name: NELI STOREY : 1945 SSN: [id number]   Address: [address] [place] [address] [email or web address]  [facility] [address] Phone:    [phone] (home)    I authorize the entity listed below to release/disclose the PHI below to:   [facility]/Fariha Gold D.O. and Fariha Gold D.O.   Provider or Entity Name:  [place] Lexie Toure DO   Address   City, State, [facility]   Phone:      Fax:     Reason for request: continuity of care   Information to be released:    [  ] LAST COLONOSCOPY,  including any PATH REPORT and follow-up  [  ] LAST FIT/COLOGUARD RESULT [  ] LAST DEXA  [  ] LAST MAMMOGRAM  [  ] LAST PAP  [  ] LAST LABS [  ] RETINA EXAM REPORT  [  ] IMMUNIZATION RECORDS  [ x ] Release all info      [ x ] Check here and initial the line next to each item to release ALL health information INCLUDING  _____ Care and treatment for drug and / or alcohol abuse  _____ HIV testing, infection status, or AIDS  _____ Genetic Testing    DATES OF SERVICE OR TIME PERIOD TO BE DISCLOSED: _____________  I understand and acknowledge that:  * This Authorization may be revoked at any time by you in writing, except if your health information has already been used or disclosed.  * Your health information that will be used or disclosed as a result of you signing this authorization could be re-disclosed by the recipient. If this occurs, your re-disclosed health information may no longer be protected by State or Federal laws.  * You may refuse to sign this Authorization. Your refusal will not affect your ability to obtain treatment.  * This Authorization becomes effective upon signing and will  on (date) __________.      If no date is indicated, this Authorization will  one (1) year from the signature date.    Name: Neli SUTHERLAND  Mary    Signature:   Date:     12/20/2022       PLEASE FAX REQUESTED RECORDS BACK TO: [phone]

## 2022-12-20 NOTE — ASSESSMENT & PLAN NOTE
New problem, unclear etiology, update lab work and could consider additional evaluation with GI if severity worsens and etiology remains unclear. Consider Celiac, SIBO, Hpylori, thyroid disease, etc at possible contributors.

## 2023-01-04 ENCOUNTER — HOSPITAL ENCOUNTER (OUTPATIENT)
Dept: LAB | Facility: MEDICAL CENTER | Age: 78
End: 2023-01-04
Attending: INTERNAL MEDICINE
Payer: MEDICARE

## 2023-01-04 DIAGNOSIS — M79.675 PAIN OF LEFT GREAT TOE: ICD-10-CM

## 2023-01-04 DIAGNOSIS — E78.2 MIXED HYPERLIPIDEMIA: ICD-10-CM

## 2023-01-04 DIAGNOSIS — E03.9 ACQUIRED HYPOTHYROIDISM: ICD-10-CM

## 2023-01-04 LAB
ALBUMIN SERPL BCP-MCNC: 4.8 G/DL (ref 3.2–4.9)
ALBUMIN/GLOB SERPL: 1.6 G/DL
ALP SERPL-CCNC: 106 U/L (ref 30–99)
ALT SERPL-CCNC: 27 U/L (ref 2–50)
ANION GAP SERPL CALC-SCNC: 14 MMOL/L (ref 7–16)
AST SERPL-CCNC: 24 U/L (ref 12–45)
BASOPHILS # BLD AUTO: 1.3 % (ref 0–1.8)
BASOPHILS # BLD: 0.1 K/UL (ref 0–0.12)
BILIRUB SERPL-MCNC: 0.4 MG/DL (ref 0.1–1.5)
BUN SERPL-MCNC: 25 MG/DL (ref 8–22)
CALCIUM ALBUM COR SERPL-MCNC: 9.1 MG/DL (ref 8.5–10.5)
CALCIUM SERPL-MCNC: 9.7 MG/DL (ref 8.5–10.5)
CHLORIDE SERPL-SCNC: 99 MMOL/L (ref 96–112)
CHOLEST SERPL-MCNC: 212 MG/DL (ref 100–199)
CO2 SERPL-SCNC: 21 MMOL/L (ref 20–33)
CREAT SERPL-MCNC: 0.85 MG/DL (ref 0.5–1.4)
EOSINOPHIL # BLD AUTO: 0.31 K/UL (ref 0–0.51)
EOSINOPHIL NFR BLD: 3.9 % (ref 0–6.9)
ERYTHROCYTE [DISTWIDTH] IN BLOOD BY AUTOMATED COUNT: 45.3 FL (ref 35.9–50)
FASTING STATUS PATIENT QL REPORTED: NORMAL
GFR SERPLBLD CREATININE-BSD FMLA CKD-EPI: 71 ML/MIN/1.73 M 2
GLOBULIN SER CALC-MCNC: 3 G/DL (ref 1.9–3.5)
GLUCOSE SERPL-MCNC: 101 MG/DL (ref 65–99)
HCT VFR BLD AUTO: 45.6 % (ref 37–47)
HDLC SERPL-MCNC: 33 MG/DL
HGB BLD-MCNC: 15.1 G/DL (ref 12–16)
IMM GRANULOCYTES # BLD AUTO: 0.06 K/UL (ref 0–0.11)
IMM GRANULOCYTES NFR BLD AUTO: 0.8 % (ref 0–0.9)
LDLC SERPL CALC-MCNC: 109 MG/DL
LYMPHOCYTES # BLD AUTO: 2.08 K/UL (ref 1–4.8)
LYMPHOCYTES NFR BLD: 26.4 % (ref 22–41)
MCH RBC QN AUTO: 31.3 PG (ref 27–33)
MCHC RBC AUTO-ENTMCNC: 33.1 G/DL (ref 33.6–35)
MCV RBC AUTO: 94.4 FL (ref 81.4–97.8)
MONOCYTES # BLD AUTO: 0.62 K/UL (ref 0–0.85)
MONOCYTES NFR BLD AUTO: 7.9 % (ref 0–13.4)
NEUTROPHILS # BLD AUTO: 4.71 K/UL (ref 2–7.15)
NEUTROPHILS NFR BLD: 59.7 % (ref 44–72)
NRBC # BLD AUTO: 0 K/UL
NRBC BLD-RTO: 0 /100 WBC
PLATELET # BLD AUTO: 313 K/UL (ref 164–446)
PMV BLD AUTO: 10.7 FL (ref 9–12.9)
POTASSIUM SERPL-SCNC: 4.1 MMOL/L (ref 3.6–5.5)
PROT SERPL-MCNC: 7.8 G/DL (ref 6–8.2)
RBC # BLD AUTO: 4.83 M/UL (ref 4.2–5.4)
SODIUM SERPL-SCNC: 134 MMOL/L (ref 135–145)
T4 FREE SERPL-MCNC: 1.45 NG/DL (ref 0.93–1.7)
TRIGL SERPL-MCNC: 350 MG/DL (ref 0–149)
TSH SERPL DL<=0.005 MIU/L-ACNC: 2.37 UIU/ML (ref 0.38–5.33)
URATE SERPL-MCNC: 8 MG/DL (ref 1.9–8.2)
WBC # BLD AUTO: 7.9 K/UL (ref 4.8–10.8)

## 2023-01-04 PROCEDURE — 80061 LIPID PANEL: CPT

## 2023-01-04 PROCEDURE — 84439 ASSAY OF FREE THYROXINE: CPT

## 2023-01-04 PROCEDURE — 84443 ASSAY THYROID STIM HORMONE: CPT

## 2023-01-04 PROCEDURE — 80053 COMPREHEN METABOLIC PANEL: CPT

## 2023-01-04 PROCEDURE — 36415 COLL VENOUS BLD VENIPUNCTURE: CPT

## 2023-01-04 PROCEDURE — 85025 COMPLETE CBC W/AUTO DIFF WBC: CPT

## 2023-01-04 PROCEDURE — 84550 ASSAY OF BLOOD/URIC ACID: CPT

## 2023-02-23 ENCOUNTER — OFFICE VISIT (OUTPATIENT)
Dept: MEDICAL GROUP | Facility: PHYSICIAN GROUP | Age: 78
End: 2023-02-23
Payer: MEDICARE

## 2023-02-23 VITALS
BODY MASS INDEX: 23.13 KG/M2 | DIASTOLIC BLOOD PRESSURE: 70 MMHG | HEART RATE: 89 BPM | HEIGHT: 62 IN | OXYGEN SATURATION: 97 % | TEMPERATURE: 98.7 F | SYSTOLIC BLOOD PRESSURE: 110 MMHG | RESPIRATION RATE: 16 BRPM | WEIGHT: 125.7 LBS

## 2023-02-23 DIAGNOSIS — Z91.89 OTHER SPECIFIED PERSONAL RISK FACTORS, NOT ELSEWHERE CLASSIFIED: ICD-10-CM

## 2023-02-23 DIAGNOSIS — F33.0 MAJOR DEPRESSIVE DISORDER, RECURRENT EPISODE, MILD (HCC): ICD-10-CM

## 2023-02-23 DIAGNOSIS — E78.2 MIXED HYPERLIPIDEMIA: ICD-10-CM

## 2023-02-23 DIAGNOSIS — I10 BENIGN HYPERTENSION: ICD-10-CM

## 2023-02-23 PROBLEM — R94.4 DECREASED GFR: Status: RESOLVED | Noted: 2022-12-20 | Resolved: 2023-02-23

## 2023-02-23 PROCEDURE — 99214 OFFICE O/P EST MOD 30 MIN: CPT | Performed by: INTERNAL MEDICINE

## 2023-02-23 PROCEDURE — 96127 BRIEF EMOTIONAL/BEHAV ASSMT: CPT | Performed by: INTERNAL MEDICINE

## 2023-02-23 ASSESSMENT — FIBROSIS 4 INDEX: FIB4 SCORE: 1.12

## 2023-02-23 ASSESSMENT — PATIENT HEALTH QUESTIONNAIRE - PHQ9
SUM OF ALL RESPONSES TO PHQ QUESTIONS 1-9: 6
5. POOR APPETITE OR OVEREATING: 0 - NOT AT ALL
CLINICAL INTERPRETATION OF PHQ2 SCORE: 2

## 2023-02-23 NOTE — PROGRESS NOTES
Subjective:   Chief Complaint/History of Present Illness:  Neli Hernandez is a 77 y.o. female established patient who presents today to discuss medical problems as listed below.     Problem   Major Depressive Disorder, Recurrent Episode, Mild (Hcc)    Depression Screening (2/2023)    Little interest or pleasure in doing things?  1 - several days   Feeling down, depressed , or hopeless? 1 - several days   Trouble falling or staying asleep, or sleeping too much?  2 - more than half the days   Feeling tired or having little energy?  2 - more than half the days   Poor appetite or overeating?  0 - not at all   Feeling bad about yourself - or that you are a failure or have let yourself or your family down? 0 - not at all   Trouble concentrating on things, such as reading the newspaper or watching television? 0 - not at all   Moving or speaking so slowly that other people could have noticed.  Or the opposite - being so fidgety or restless that you have been moving around a lot more than usual?  0 - not at all   Thoughts that you would be better off dead, or of hurting yourself?  0 - not at all   Patient Health Questionnaire Score: 6     Interpretation of PHQ-9 Total Score   Score Severity   1-4 No Depression   5-9 Mild Depression   10-14 Moderate Depression   15-19 Moderately Severe Depression   20-27 Severe Depression    She reports insidious onset of mood changes that include more apathy as well as decreased motivation. Much of this related to isolation from pandemic/weather. Often compensates with extra sleep to help break the pattern. Used pharmacotherapy years ago, did not feel it did much.        Benign Hypertension    She has a history of hypertension since her late 50's.    Her blood pressure tends to run higher when she is at the doctor due to white coat hypertension. Her heart rate at home tends to run in the 60's and she has a wrist BP cuff but does not usually check it.      She had decompensation of her  blood pressure in early summer 2021.  Her blood pressure will run as high as the 170s systolic but often not below the 140s systolic.  She also has some associated palpitations with heart rate up to the low 100s. Additional pharmacotherapy was added with good result.      BP in clinic ranging 110-124/60-70    Current regimen: losartan 100 mg daily, triamterene-hctz 37.5-25 mg daily and metoprolol succinate 25 mg daily     Hyperlipidemia     Latest Reference Range & Units 01/04/23 12:01   Cholesterol,Tot 100 - 199 mg/dL 212 (H)   Triglycerides 0 - 149 mg/dL 350 (H)   HDL >=40 mg/dL 33 !   LDL <100 mg/dL 109 (H)     The 10-year ASCVD risk score (Shayan SOLIS, et al., 2019) is: 17.3%    She reports using lovastatin since her 60's. It was started due to elevated cholesterol values, she denies any history of cardiovascular or cerebrovascular disease.     She reports that her triglycerides have never been as high as they are now. No history of pancreatitis. She has never been on fenofibrate medications.      Current regimen: Lovastatin 40 mg daily     Decreased Gfr (Resolved)        Current Medications:  Current Outpatient Medications Ordered in Epic   Medication Sig Dispense Refill    Multiple Vitamins-Minerals (PRESERVISION AREDS PO) Take  by mouth.      triamterene-hctz (MAXZIDE-25/DYAZIDE) 37.5-25 MG Tab Take 1 Tablet by mouth every day. Non child proof cap 100 Tablet 3    losartan (COZAAR) 100 MG Tab TAKE ONE TABLET BY MOUTH EVERY  Tablet 0    levothyroxine (SYNTHROID) 50 MCG Tab TAKE ONE TABLET BY MOUTH DAILY, TUESDAY AND THURSDAY TAKE TWO TABLETS DAILY 124 Tablet 0    metoprolol SR (TOPROL XL) 25 MG TABLET SR 24 HR TAKE ONE TABLET BY MOUTH EVERY  Tablet 3    lovastatin (MEVACOR) 40 MG tablet Take 1 Tablet by mouth every day. 100 Tablet 3    Cholecalciferol (VITAMIN D3) 2000 UNIT Cap Take 1 Capsule by mouth every day. 100 Capsule 3     No current [facility]-ordered facility-administered medications on file.  "         Objective:   Physical Exam:    Vitals: /70 (BP Location: Left arm, Patient Position: Sitting, BP Cuff Size: Adult)   Pulse 89   Temp 37.1 °C (98.7 °F) (Temporal)   Resp 16   Ht 1.562 m (5' 1.5\")   Wt 57 kg (125 lb 11.2 oz)   SpO2 97%    BMI: Body mass index is 23.37 kg/m².  Physical Exam  Constitutional:       General: She is not in acute distress.     Appearance: Normal appearance. She is normal weight. She is not ill-appearing.   Eyes:      General: No scleral icterus.     Conjunctiva/sclera: Conjunctivae normal.   Neck:      Comments: thyromegaly  Cardiovascular:      Rate and Rhythm: Normal rate and regular rhythm.      Pulses: Normal pulses.   Pulmonary:      Effort: Pulmonary effort is normal. No respiratory distress.      Breath sounds: No wheezing or rhonchi.   Musculoskeletal:      Right lower leg: No edema.      Left lower leg: No edema.   Skin:     General: Skin is warm and dry.      Findings: No rash.   Neurological:      Gait: Gait normal.   Psychiatric:         Behavior: Behavior normal.         Thought Content: Thought content normal.         Judgment: Judgment normal.      Comments: Apathy/poor motivation             Assessment and Plan:   Neli is a 77 y.o. female with the following:  Problem List Items Addressed This Visit       Hyperlipidemia     Chronic decompensated problem, she reports more dietary indiscretions during the holidays and less activity due to colder weather causing isolation as well as poor motivation. Agreeable to pursuing CT cardiac score for additional risk stratification. Will likely recommend rosuvastatin and possibly fenofibrate pending results of above. Okay to continue lovastatin in the meantime.         Benign hypertension     Chronic stable problem, blood pressure at goal on current therapy, slight hyponatremia and azotemia likely from HCTZ, continue current regimen at this time with losartan 100 mg daily, triamterene-hctz 37.5-25 mg, and " metoprolol succinate 25 mg daily.         Major depressive disorder, recurrent episode, mild (HCC)     New problem, mild severity, no SI/HI, would like to hold off on pharmacotherapy at this time. Could consider referral to behavioral health for therapy if she would be interested in the future.          Other Visit Diagnoses       Other specified personal risk factors, not elsewhere classified        Relevant Orders    CT-CARDIAC SCORING             RTC: Return in about 4 months (around 6/27/2023).    I spent a total of 36 minutes with record review, exam, communication with the patient, communication with other providers, and documentation of this encounter.    PLEASE NOTE: This dictation was created using voice recognition software. I have made every reasonable attempt to correct obvious errors, but I expect that there are errors of grammar and possibly content that I did not discover before finalizing the note.      Fariha Gold, DO  Geriatric and Internal Medicine  [facility]

## 2023-02-23 NOTE — ASSESSMENT & PLAN NOTE
Chronic decompensated problem, she reports more dietary indiscretions during the holidays and less activity due to colder weather causing isolation as well as poor motivation. Agreeable to pursuing CT cardiac score for additional risk stratification. Will likely recommend rosuvastatin and possibly fenofibrate pending results of above. Okay to continue lovastatin in the meantime.

## 2023-02-23 NOTE — ASSESSMENT & PLAN NOTE
New problem, mild severity, no SI/HI, would like to hold off on pharmacotherapy at this time. Could consider referral to behavioral health for therapy if she would be interested in the future.

## 2023-02-23 NOTE — ASSESSMENT & PLAN NOTE
Chronic stable problem, blood pressure at goal on current therapy, slight hyponatremia and azotemia likely from HCTZ, continue current regimen at this time with losartan 100 mg daily, triamterene-hctz 37.5-25 mg, and metoprolol succinate 25 mg daily.

## 2023-02-24 ENCOUNTER — HOSPITAL ENCOUNTER (OUTPATIENT)
Dept: RADIOLOGY | Facility: MEDICAL CENTER | Age: 78
End: 2023-02-24
Attending: INTERNAL MEDICINE
Payer: COMMERCIAL

## 2023-02-24 DIAGNOSIS — Z91.89 OTHER SPECIFIED PERSONAL RISK FACTORS, NOT ELSEWHERE CLASSIFIED: ICD-10-CM

## 2023-02-24 DIAGNOSIS — E03.9 ACQUIRED HYPOTHYROIDISM: ICD-10-CM

## 2023-02-24 PROCEDURE — 4410556 CT-CARDIAC SCORING (SELF PAY ONLY)

## 2023-02-24 RX ORDER — LEVOTHYROXINE SODIUM 0.05 MG/1
TABLET ORAL
Qty: 124 TABLET | Refills: 3 | Status: SHIPPED | OUTPATIENT
Start: 2023-02-24 | End: 2023-03-20 | Stop reason: SDUPTHER

## 2023-03-02 DIAGNOSIS — I10 BENIGN HYPERTENSION: ICD-10-CM

## 2023-03-02 DIAGNOSIS — R93.1 AGATSTON CAC SCORE 200-399: ICD-10-CM

## 2023-03-02 DIAGNOSIS — E78.2 MIXED HYPERLIPIDEMIA: ICD-10-CM

## 2023-03-02 DIAGNOSIS — I70.0 THORACIC AORTIC ATHEROSCLEROSIS (HCC): ICD-10-CM

## 2023-03-02 RX ORDER — ROSUVASTATIN CALCIUM 20 MG/1
20 TABLET, COATED ORAL EVERY EVENING
Qty: 100 TABLET | Refills: 3 | Status: SHIPPED | OUTPATIENT
Start: 2023-03-02 | End: 2023-07-05

## 2023-03-06 DIAGNOSIS — I10 BENIGN HYPERTENSION: ICD-10-CM

## 2023-03-06 RX ORDER — LOSARTAN POTASSIUM 100 MG/1
TABLET ORAL
Qty: 100 TABLET | Refills: 3 | Status: SHIPPED | OUTPATIENT
Start: 2023-03-06 | End: 2024-02-05 | Stop reason: SDUPTHER

## 2023-03-06 NOTE — TELEPHONE ENCOUNTER
Received request via: Pharmacy    Was the patient seen in the last year in this department? Yes    Does the patient have an active prescription (recently filled or refills available) for medication(s) requested? No    Does the patient have [facility] and need 100 day supply (blood pressure, diabetes and cholesterol meds only)? Medication is not for cholesterol, blood pressure or diabetes

## 2023-03-18 ENCOUNTER — PATIENT MESSAGE (OUTPATIENT)
Dept: MEDICAL GROUP | Facility: PHYSICIAN GROUP | Age: 78
End: 2023-03-18
Payer: MEDICARE

## 2023-03-18 DIAGNOSIS — E03.9 ACQUIRED HYPOTHYROIDISM: ICD-10-CM

## 2023-03-20 DIAGNOSIS — E03.9 ACQUIRED HYPOTHYROIDISM: ICD-10-CM

## 2023-03-20 RX ORDER — LEVOTHYROXINE SODIUM 0.05 MG/1
50 TABLET ORAL
Qty: 100 TABLET | Refills: 3 | Status: SHIPPED
Start: 2023-03-20 | End: 2023-03-20

## 2023-03-20 RX ORDER — LEVOTHYROXINE SODIUM 0.05 MG/1
TABLET ORAL
Qty: 124 TABLET | Refills: 3 | Status: SHIPPED | OUTPATIENT
Start: 2023-03-20 | End: 2024-02-05 | Stop reason: SDUPTHER

## 2023-04-28 ENCOUNTER — TELEPHONE (OUTPATIENT)
Dept: CARDIOLOGY | Facility: MEDICAL CENTER | Age: 78
End: 2023-04-28
Payer: MEDICARE

## 2023-04-28 NOTE — TELEPHONE ENCOUNTER
Spoke to patient in regards to obtaining records for NP appointment with DA. Per patient has been treated by a previous cardiologist 15 years +. Confirmed all recent notes, labs, and cardiac imaging are in Epic. Confirmed with patient appointment time, location, and date.    Dr. Martinez more than 15+ years ago, family hx of TIAs and heart disease.     Patient noted she has been experiencing increased fatigue since starting ROSUVASTATIN. Patient will wait until she see Dr. Villa for medication adjustments.

## 2023-05-01 ENCOUNTER — TELEPHONE (OUTPATIENT)
Dept: MEDICAL GROUP | Facility: PHYSICIAN GROUP | Age: 78
End: 2023-05-01
Payer: MEDICARE

## 2023-05-01 NOTE — TELEPHONE ENCOUNTER
1. Caller Name: Neli Hernandez                          Call Back Number: [phone] (home)         How would the patient prefer to be contacted with a response: Phone call OK to leave a detailed message    Patient called and left a voice mail message.  She is not tolerating the new generic Crestor you prescribed.  It is making her very tired all the time.  What else can she take?  She has a visit with you in June and the cardiologist in July.   What should she do?

## 2023-05-02 NOTE — TELEPHONE ENCOUNTER
Could she try breaking them in 1/2 to see if a lower dose would be more tolerable than the 20 mg of rosuvastatin? Is it only feeling tired or are there any other clear side effects?

## 2023-05-04 ENCOUNTER — APPOINTMENT (OUTPATIENT)
Dept: CARDIOLOGY | Facility: MEDICAL CENTER | Age: 78
End: 2023-05-04
Attending: INTERNAL MEDICINE
Payer: MEDICARE

## 2023-06-15 ENCOUNTER — TELEPHONE (OUTPATIENT)
Dept: MEDICAL GROUP | Facility: PHYSICIAN GROUP | Age: 78
End: 2023-06-15
Payer: MEDICARE

## 2023-06-15 DIAGNOSIS — E03.9 ACQUIRED HYPOTHYROIDISM: ICD-10-CM

## 2023-06-15 DIAGNOSIS — E78.2 MIXED HYPERLIPIDEMIA: ICD-10-CM

## 2023-06-15 DIAGNOSIS — R73.03 PREDIABETES: ICD-10-CM

## 2023-06-15 DIAGNOSIS — E55.9 VITAMIN D DEFICIENCY: ICD-10-CM

## 2023-06-16 NOTE — TELEPHONE ENCOUNTER
1. Caller Name: Neli Hernandez                          Call Back Number: [phone] (home)         How would the patient prefer to be contacted with a response: Phone call OK to leave a detailed message    Patient called to ask for labs before next visit.  Last labs January 2023

## 2023-06-20 ENCOUNTER — TELEPHONE (OUTPATIENT)
Dept: CARDIOLOGY | Facility: MEDICAL CENTER | Age: 78
End: 2023-06-20
Payer: MEDICARE

## 2023-06-20 NOTE — TELEPHONE ENCOUNTER
Called patient in regards to records for NP appointment with Dr. BRADY To review most recent lab results, ekg, any cardiac testing or ,if she has been treated by a cardiologist. No answer, left voicemail to call back

## 2023-06-23 ENCOUNTER — HOSPITAL ENCOUNTER (OUTPATIENT)
Dept: LAB | Facility: MEDICAL CENTER | Age: 78
End: 2023-06-23
Attending: INTERNAL MEDICINE
Payer: MEDICARE

## 2023-06-23 DIAGNOSIS — E03.9 ACQUIRED HYPOTHYROIDISM: ICD-10-CM

## 2023-06-23 DIAGNOSIS — R73.03 PREDIABETES: ICD-10-CM

## 2023-06-23 DIAGNOSIS — E78.2 MIXED HYPERLIPIDEMIA: ICD-10-CM

## 2023-06-23 DIAGNOSIS — E55.9 VITAMIN D DEFICIENCY: ICD-10-CM

## 2023-06-23 LAB
25(OH)D3 SERPL-MCNC: 57 NG/ML (ref 30–100)
ALBUMIN SERPL BCP-MCNC: 4.5 G/DL (ref 3.2–4.9)
ALBUMIN/GLOB SERPL: 1.3 G/DL
ALP SERPL-CCNC: 105 U/L (ref 30–99)
ALT SERPL-CCNC: 29 U/L (ref 2–50)
ANION GAP SERPL CALC-SCNC: 12 MMOL/L (ref 7–16)
AST SERPL-CCNC: 25 U/L (ref 12–45)
BASOPHILS # BLD AUTO: 1.1 % (ref 0–1.8)
BASOPHILS # BLD: 0.1 K/UL (ref 0–0.12)
BILIRUB SERPL-MCNC: 0.4 MG/DL (ref 0.1–1.5)
BUN SERPL-MCNC: 28 MG/DL (ref 8–22)
CALCIUM ALBUM COR SERPL-MCNC: 9.4 MG/DL (ref 8.5–10.5)
CALCIUM SERPL-MCNC: 9.8 MG/DL (ref 8.5–10.5)
CHLORIDE SERPL-SCNC: 99 MMOL/L (ref 96–112)
CHOLEST SERPL-MCNC: 151 MG/DL (ref 100–199)
CO2 SERPL-SCNC: 26 MMOL/L (ref 20–33)
CREAT SERPL-MCNC: 0.98 MG/DL (ref 0.5–1.4)
EOSINOPHIL # BLD AUTO: 0.29 K/UL (ref 0–0.51)
EOSINOPHIL NFR BLD: 3.2 % (ref 0–6.9)
ERYTHROCYTE [DISTWIDTH] IN BLOOD BY AUTOMATED COUNT: 44.5 FL (ref 35.9–50)
EST. AVERAGE GLUCOSE BLD GHB EST-MCNC: 123 MG/DL
FASTING STATUS PATIENT QL REPORTED: NORMAL
GFR SERPLBLD CREATININE-BSD FMLA CKD-EPI: 60 ML/MIN/1.73 M 2
GLOBULIN SER CALC-MCNC: 3.4 G/DL (ref 1.9–3.5)
GLUCOSE SERPL-MCNC: 94 MG/DL (ref 65–99)
HBA1C MFR BLD: 5.9 % (ref 4–5.6)
HCT VFR BLD AUTO: 44.2 % (ref 37–47)
HDLC SERPL-MCNC: 31 MG/DL
HGB BLD-MCNC: 14.8 G/DL (ref 12–16)
IMM GRANULOCYTES # BLD AUTO: 0.04 K/UL (ref 0–0.11)
IMM GRANULOCYTES NFR BLD AUTO: 0.4 % (ref 0–0.9)
LDLC SERPL CALC-MCNC: 60 MG/DL
LYMPHOCYTES # BLD AUTO: 2.09 K/UL (ref 1–4.8)
LYMPHOCYTES NFR BLD: 22.7 % (ref 22–41)
MCH RBC QN AUTO: 30.9 PG (ref 27–33)
MCHC RBC AUTO-ENTMCNC: 33.5 G/DL (ref 32.2–35.5)
MCV RBC AUTO: 92.3 FL (ref 81.4–97.8)
MONOCYTES # BLD AUTO: 0.65 K/UL (ref 0–0.85)
MONOCYTES NFR BLD AUTO: 7.1 % (ref 0–13.4)
NEUTROPHILS # BLD AUTO: 6.02 K/UL (ref 1.82–7.42)
NEUTROPHILS NFR BLD: 65.5 % (ref 44–72)
NRBC # BLD AUTO: 0 K/UL
NRBC BLD-RTO: 0 /100 WBC (ref 0–0.2)
PLATELET # BLD AUTO: 290 K/UL (ref 164–446)
PMV BLD AUTO: 11 FL (ref 9–12.9)
POTASSIUM SERPL-SCNC: 4.1 MMOL/L (ref 3.6–5.5)
PROT SERPL-MCNC: 7.9 G/DL (ref 6–8.2)
RBC # BLD AUTO: 4.79 M/UL (ref 4.2–5.4)
SODIUM SERPL-SCNC: 137 MMOL/L (ref 135–145)
T4 FREE SERPL-MCNC: 1.65 NG/DL (ref 0.93–1.7)
TRIGL SERPL-MCNC: 301 MG/DL (ref 0–149)
TSH SERPL DL<=0.005 MIU/L-ACNC: 2.08 UIU/ML (ref 0.38–5.33)
VIT B12 SERPL-MCNC: 668 PG/ML (ref 211–911)
WBC # BLD AUTO: 9.2 K/UL (ref 4.8–10.8)

## 2023-06-23 PROCEDURE — 85025 COMPLETE CBC W/AUTO DIFF WBC: CPT

## 2023-06-23 PROCEDURE — 84439 ASSAY OF FREE THYROXINE: CPT

## 2023-06-23 PROCEDURE — 82607 VITAMIN B-12: CPT

## 2023-06-23 PROCEDURE — 36415 COLL VENOUS BLD VENIPUNCTURE: CPT

## 2023-06-23 PROCEDURE — 84443 ASSAY THYROID STIM HORMONE: CPT

## 2023-06-23 PROCEDURE — 80053 COMPREHEN METABOLIC PANEL: CPT

## 2023-06-23 PROCEDURE — 82306 VITAMIN D 25 HYDROXY: CPT

## 2023-06-23 PROCEDURE — 83036 HEMOGLOBIN GLYCOSYLATED A1C: CPT

## 2023-06-23 PROCEDURE — 80061 LIPID PANEL: CPT

## 2023-06-27 ENCOUNTER — OFFICE VISIT (OUTPATIENT)
Dept: MEDICAL GROUP | Facility: PHYSICIAN GROUP | Age: 78
End: 2023-06-27
Payer: MEDICARE

## 2023-06-27 VITALS
OXYGEN SATURATION: 95 % | HEIGHT: 62 IN | WEIGHT: 122 LBS | RESPIRATION RATE: 12 BRPM | BODY MASS INDEX: 22.45 KG/M2 | TEMPERATURE: 97.4 F | HEART RATE: 64 BPM | SYSTOLIC BLOOD PRESSURE: 112 MMHG | DIASTOLIC BLOOD PRESSURE: 60 MMHG

## 2023-06-27 DIAGNOSIS — R74.8 ELEVATED ALKALINE PHOSPHATASE LEVEL: ICD-10-CM

## 2023-06-27 DIAGNOSIS — I70.0 THORACIC AORTA ATHEROSCLEROSIS (HCC): ICD-10-CM

## 2023-06-27 DIAGNOSIS — E78.2 MIXED HYPERLIPIDEMIA: ICD-10-CM

## 2023-06-27 DIAGNOSIS — I10 BENIGN HYPERTENSION: ICD-10-CM

## 2023-06-27 DIAGNOSIS — R73.03 PREDIABETES: ICD-10-CM

## 2023-06-27 DIAGNOSIS — E03.9 ACQUIRED HYPOTHYROIDISM: ICD-10-CM

## 2023-06-27 PROCEDURE — 3078F DIAST BP <80 MM HG: CPT | Performed by: INTERNAL MEDICINE

## 2023-06-27 PROCEDURE — 99214 OFFICE O/P EST MOD 30 MIN: CPT | Performed by: INTERNAL MEDICINE

## 2023-06-27 PROCEDURE — 3074F SYST BP LT 130 MM HG: CPT | Performed by: INTERNAL MEDICINE

## 2023-06-27 RX ORDER — UBIDECARENONE 30 MG
100 CAPSULE ORAL DAILY
COMMUNITY

## 2023-06-27 RX ORDER — DESONIDE 0.5 MG/G
CREAM TOPICAL
COMMUNITY
Start: 2023-04-26 | End: 2023-06-27

## 2023-06-27 RX ORDER — COVID-19 ANTIGEN TEST
KIT MISCELLANEOUS
COMMUNITY
Start: 2023-05-10 | End: 2023-06-27

## 2023-06-27 ASSESSMENT — FIBROSIS 4 INDEX: FIB4 SCORE: 1.22

## 2023-06-27 NOTE — ASSESSMENT & PLAN NOTE
Problem, incidental finding on CT cardiac score, she is appropriately on rosuvastatin and baby aspirin.

## 2023-06-27 NOTE — ASSESSMENT & PLAN NOTE
Chronic stable problem, thyroid levels are at goal, continue current medical therapy with levothyroxine 50 mcg 5 days/week and 100 mcg on Tuesdays and Thursdays.

## 2023-06-27 NOTE — PROGRESS NOTES
Subjective:   Chief Complaint/History of Present Illness:  Neli Hernandez is a 77 y.o. female established patient who presents today to discuss medical problems as listed below. Neli is unaccompanied for today's visit.    Problem   Thoracic Aorta Atherosclerosis (Hcc)    CT cardiac score showed ascending thoracic aortic atherosclerosis.  She is already on cholesterol medication and aspirin.     Elevated Alkaline Phosphatase Level     Latest Reference Range & Units 06/07/22 13:37 01/04/23 12:01 06/23/23 13:06   Alkaline Phosphatase 30 - 99 U/L 100 (H) 106 (H) 105 (H)     She has mild elevation of alkaline phosphatase level over the past year.  No known history of liver disease.  Other liver enzymes are normal.  She has some arthritis which she is feeling more in her hip, knee, and fingers.     Prediabetes     Latest Reference Range & Units 06/23/23 13:06   Glycohemoglobin 4.0 - 5.6 % 5.9 (H)   Estim. Avg Glu mg/dL 123     A1c 6.0 in mid August 2019. No prior A1c checks, she denies prior knowledge of pre-diabetes. She reports stable weight and has a normal BMI. She has had a decompensation of her triglycerides on recent analysis. No blurred vision, polyuria, or polydipsia. She has brought down her A1c through dietary changes.     Benign Hypertension    She has a history of hypertension since her late 50's.    Her blood pressure tends to run higher when she is at the doctor due to white coat hypertension. Her heart rate at home tends to run in the 60's and she has a wrist BP cuff but does not usually check it.      She had decompensation of her blood pressure in early summer 2021.  Her blood pressure will run as high as the 170s systolic but often not below the 140s systolic.  She also has some associated palpitations with heart rate up to the low 100s. Additional pharmacotherapy was added with good result.      BP in clinic ranging 110-124/60-70    Current regimen: losartan 100 mg daily, triamterene-hctz  37.5-25 mg daily and metoprolol succinate 25 mg daily     Acquired Hypothyroidism     Latest Reference Range & Units 06/23/23 13:06   TSH 0.380 - 5.330 uIU/mL 2.080   Free T-4 0.93 - 1.70 ng/dL 1.65     Maintained on levothyroxine 50 mcg for many years, well controlled. She previously followed with endocrinology, Dr. Win. She denies any fluctuations or changes to her dose for quite some time. She denies any signs or symptoms of over treatment or under treatment at this time.    Current regimen: levothyroxine 50 mcg 5 days per week and 100 mcg on Tues/Thurs     Hyperlipidemia     Latest Reference Range & Units 06/23/23 13:06   Cholesterol,Tot 100 - 199 mg/dL 151   Triglycerides 0 - 149 mg/dL 301 (H)   HDL >=40 mg/dL 31 !   LDL <100 mg/dL 60     The 10-year ASCVD risk score (Shayan SOLIS, et al., 2019) is: 17.5%    CT cardiac score (2023):  Coronary calcification:  LMA - 0  LCX - 47  LAD - 304  RCA - 1     Total Calcium Score: 352    She reports using lovastatin since her 60's. It was started due to elevated cholesterol values, she denies any history of cardiovascular or cerebrovascular disease.     She reports that her triglycerides have never been as high as they are now. No history of pancreatitis. She has never been on fenofibrate medications.  Elevated CT cardiac score so she is now on statin therapy.    Current regimen: Rosuvastatin 10 mg daily  Previous regimen: Lovastatin 40 mg daily          Current Medications:  Current Outpatient Medications Ordered in Epic   Medication Sig Dispense Refill    Phytosterol Esters (CHOLEST CARE PO) Take 800 mg by mouth 3 times a day. CholestaCare- Propriatery Sterol Blend      coenzyme Q-10 100 MG Cap capsule Take 100 mg by mouth every day.      levothyroxine (SYNTHROID) 50 MCG Tab Take 1 50 mcg 5 days per week and 2 tabs on Tues/Thurs 124 Tablet 3    losartan (COZAAR) 100 MG Tab TAKE ONE TABLET BY MOUTH ONE TIME DAILY 100 Tablet 3    rosuvastatin (CRESTOR) 20 MG Tab Take 1  "Tablet by mouth every evening. 100 Tablet 3    aspirin EC (ECOTRIN) 81 MG Tablet Delayed Response Take 1 Tablet by mouth every day. 100 Tablet 3    Multiple Vitamins-Minerals (PRESERVISION AREDS PO) Take  by mouth.      triamterene-hctz (MAXZIDE-25/DYAZIDE) 37.5-25 MG Tab Take 1 Tablet by mouth every day. Non child proof cap 100 Tablet 3    metoprolol SR (TOPROL XL) 25 MG TABLET SR 24 HR TAKE ONE TABLET BY MOUTH EVERY  Tablet 3    Cholecalciferol (VITAMIN D3) 2000 UNIT Cap Take 1 Capsule by mouth every day. 100 Capsule 3     No current [facility]-ordered facility-administered medications on file.          Objective:   Physical Exam:    Vitals: /60 (BP Location: Right arm, Patient Position: Sitting, BP Cuff Size: Adult)   Pulse 64   Temp 36.3 °C (97.4 °F) (Temporal)   Resp 12   Ht 1.562 m (5' 1.5\")   Wt 55.3 kg (122 lb)   SpO2 95%    BMI: Body mass index is 22.68 kg/m².  Physical Exam  Constitutional:       General: She is not in acute distress.     Appearance: Normal appearance. She is not ill-appearing.   HENT:      Right Ear: External ear normal. There is no impacted cerumen.      Left Ear: External ear normal. There is no impacted cerumen.   Eyes:      General: No scleral icterus.     Conjunctiva/sclera: Conjunctivae normal.   Cardiovascular:      Rate and Rhythm: Normal rate and regular rhythm.      Pulses: Normal pulses.      Heart sounds: No murmur heard.  Pulmonary:      Effort: Pulmonary effort is normal. No respiratory distress.      Breath sounds: No wheezing, rhonchi or rales.   Abdominal:      General: Bowel sounds are normal. There is no distension.      Palpations: Abdomen is soft.      Tenderness: There is no abdominal tenderness.   Musculoskeletal:      Right lower leg: No edema.      Left lower leg: No edema.   Lymphadenopathy:      Cervical: No cervical adenopathy.   Skin:     General: Skin is warm and dry.      Findings: No rash.   Neurological:      Gait: Gait normal.   Psychiatric:  "        Mood and Affect: Mood normal.         Behavior: Behavior normal.         Thought Content: Thought content normal.         Judgment: Judgment normal.               Assessment and Plan:   Neli is a 77 y.o. female with the following:  Problem List Items Addressed This Visit       Acquired hypothyroidism     Chronic stable problem, thyroid levels are at goal, continue current medical therapy with levothyroxine 50 mcg 5 days/week and 100 mcg on Tuesdays and Thursdays.         Benign hypertension     Chronic stable problem.  Blood pressure is at goal on current medical therapy, continue losartan 100 mg daily, triamterene-hydrochlorothiazide 37.5-25 mg daily, and metoprolol succinate 25 mg daily.  Electrolytes and renal function are appropriate.         Elevated alkaline phosphatase level     New problem, mild severity, will check isoenzymes with next routine blood work.         Relevant Orders    ALKALINE PHOSPHATASE ISOENZYMES    Hyperlipidemia     Chronic ongoing problem.  LDL is better with rosuvastatin however triglycerides are still elevated.  She is trying to be very strict with her diet while also making the lifestyle changes sustainable.  She is using Colestid care 800 mg 3 times daily and rosuvastatin 10 mg daily.  She hopes to get off statin therapy moving forward.  She will meet with cardiology next week.         Relevant Orders    Lipid Profile    Prediabetes     Chronic ongoing problem, she had resolved her prediabetes but it has returned.  She is trying to be very mindful of her diet and ingesting various supplements to help with her cholesterol.  We will plan to recheck in 3 months to ensure improvement.         Relevant Orders    HEMOGLOBIN A1C    Thoracic aorta atherosclerosis (HCC)     Problem, incidental finding on CT cardiac score, she is appropriately on rosuvastatin and baby aspirin.               RTC: Return in about 6 months (around 12/27/2023) for get labs on/after 9/23/2023.    I spent  a total of 36 minutes with record review, exam, communication with the patient, communication with other providers, and documentation of this encounter.    PLEASE NOTE: This dictation was created using voice recognition software. I have made every reasonable attempt to correct obvious errors, but I expect that there are errors of grammar and possibly content that I did not discover before finalizing the note.      Fariha Gold, DO  Geriatric and Internal Medicine  [facility]

## 2023-06-27 NOTE — ASSESSMENT & PLAN NOTE
Chronic ongoing problem.  LDL is better with rosuvastatin however triglycerides are still elevated.  She is trying to be very strict with her diet while also making the lifestyle changes sustainable.  She is using Colestid care 800 mg 3 times daily and rosuvastatin 10 mg daily.  She hopes to get off statin therapy moving forward.  She will meet with cardiology next week.

## 2023-06-27 NOTE — ASSESSMENT & PLAN NOTE
Chronic ongoing problem, she had resolved her prediabetes but it has returned.  She is trying to be very mindful of her diet and ingesting various supplements to help with her cholesterol.  We will plan to recheck in 3 months to ensure improvement.

## 2023-06-27 NOTE — ASSESSMENT & PLAN NOTE
Chronic stable problem.  Blood pressure is at goal on current medical therapy, continue losartan 100 mg daily, triamterene-hydrochlorothiazide 37.5-25 mg daily, and metoprolol succinate 25 mg daily.  Electrolytes and renal function are appropriate.

## 2023-06-28 NOTE — TELEPHONE ENCOUNTER
Called patient in regards to records for NP appointment with Dr. BRADY To review most recent lab results, ekg, any cardiac testing or ,if they had been treated by a cardiologist. No answer, left voicemail to call back

## 2023-07-05 ENCOUNTER — OFFICE VISIT (OUTPATIENT)
Dept: CARDIOLOGY | Facility: MEDICAL CENTER | Age: 78
End: 2023-07-05
Attending: INTERNAL MEDICINE
Payer: MEDICARE

## 2023-07-05 VITALS
SYSTOLIC BLOOD PRESSURE: 116 MMHG | HEART RATE: 75 BPM | OXYGEN SATURATION: 95 % | RESPIRATION RATE: 12 BRPM | WEIGHT: 123 LBS | HEIGHT: 62 IN | DIASTOLIC BLOOD PRESSURE: 70 MMHG | BODY MASS INDEX: 22.63 KG/M2

## 2023-07-05 DIAGNOSIS — E78.2 MIXED HYPERLIPIDEMIA: ICD-10-CM

## 2023-07-05 DIAGNOSIS — I70.0 THORACIC AORTA ATHEROSCLEROSIS (HCC): ICD-10-CM

## 2023-07-05 DIAGNOSIS — I10 ESSENTIAL HYPERTENSION: ICD-10-CM

## 2023-07-05 DIAGNOSIS — I10 BENIGN HYPERTENSION: ICD-10-CM

## 2023-07-05 DIAGNOSIS — R93.1 ELEVATED CORONARY ARTERY CALCIUM SCORE: ICD-10-CM

## 2023-07-05 LAB — EKG IMPRESSION: NORMAL

## 2023-07-05 PROCEDURE — 3078F DIAST BP <80 MM HG: CPT | Performed by: INTERNAL MEDICINE

## 2023-07-05 PROCEDURE — 93010 ELECTROCARDIOGRAM REPORT: CPT | Performed by: INTERNAL MEDICINE

## 2023-07-05 PROCEDURE — 93005 ELECTROCARDIOGRAM TRACING: CPT | Performed by: INTERNAL MEDICINE

## 2023-07-05 PROCEDURE — 99213 OFFICE O/P EST LOW 20 MIN: CPT | Performed by: INTERNAL MEDICINE

## 2023-07-05 PROCEDURE — 3074F SYST BP LT 130 MM HG: CPT | Performed by: INTERNAL MEDICINE

## 2023-07-05 PROCEDURE — 99212 OFFICE O/P EST SF 10 MIN: CPT | Performed by: INTERNAL MEDICINE

## 2023-07-05 PROCEDURE — 99204 OFFICE O/P NEW MOD 45 MIN: CPT | Performed by: INTERNAL MEDICINE

## 2023-07-05 RX ORDER — EZETIMIBE 10 MG/1
10 TABLET ORAL DAILY
Qty: 100 TABLET | Refills: 3 | Status: SHIPPED | OUTPATIENT
Start: 2023-07-05 | End: 2024-01-26 | Stop reason: SDUPTHER

## 2023-07-05 RX ORDER — LOVASTATIN 20 MG/1
20 TABLET ORAL NIGHTLY
Qty: 100 TABLET | Refills: 3 | Status: SHIPPED | OUTPATIENT
Start: 2023-07-05 | End: 2024-02-05 | Stop reason: SDUPTHER

## 2023-07-05 RX ORDER — METOPROLOL SUCCINATE 25 MG/1
25 TABLET, EXTENDED RELEASE ORAL
Qty: 100 TABLET | Refills: 3 | Status: SHIPPED | OUTPATIENT
Start: 2023-07-05 | End: 2024-01-25 | Stop reason: SDUPTHER

## 2023-07-05 RX ORDER — CHLORAL HYDRATE 500 MG
2000 CAPSULE ORAL 2 TIMES DAILY
Qty: 400 CAPSULE | Refills: 0 | COMMUNITY
Start: 2023-07-05 | End: 2023-12-05

## 2023-07-05 ASSESSMENT — ENCOUNTER SYMPTOMS
HEARTBURN: 1
CONSTIPATION: 0
LIGHT-HEADEDNESS: 0
PARESTHESIAS: 0
HEADACHES: 0
FALLS: 0
COUGH: 0
DOUBLE VISION: 0
LOSS OF BALANCE: 0
SLEEP DISTURBANCES DUE TO BREATHING: 0
VOMITING: 0
SORE THROAT: 0
FEVER: 0
FLANK PAIN: 0
PND: 0
WEAKNESS: 0
EXCESSIVE DAYTIME SLEEPINESS: 0
NAUSEA: 0
DIAPHORESIS: 0
DIARRHEA: 0
BLOATING: 0
BLURRED VISION: 0
MYALGIAS: 0
PALPITATIONS: 0
SYNCOPE: 0
IRREGULAR HEARTBEAT: 0
DIZZINESS: 0
BACK PAIN: 0
DYSPNEA ON EXERTION: 0
SHORTNESS OF BREATH: 0
DECREASED APPETITE: 0
WHEEZING: 0
NUMBNESS: 0
NIGHT SWEATS: 0
NEAR-SYNCOPE: 0
ORTHOPNEA: 0

## 2023-07-05 ASSESSMENT — FIBROSIS 4 INDEX: FIB4 SCORE: 1.22

## 2023-07-05 NOTE — PROGRESS NOTES
Cardiology Initial Consultation Note    Date of note:    7/5/2023    Primary Care Provider: Fariha Gold D.O.  Referring Provider: Fariha Gold D.O.    Patient Name: Neli Hernandez   YOB: 1945  MRN:              [id number]    Chief Complaint   Patient presents with    Chest Pain    Aortic Atherosclerosis     NP Dx: Thoracic aorta atherosclerosis (HCC)         History of Present Illness: Ms. Neli Hernandez is a 77 y.o. female whose current medical problems include dyslipidemia, elevated coronary calcium score and aortic atherosclerosis who is here for cardiac consultation for chest pain and aortic atherosclerosis.    Patient states that she is referred to cardiology because of family history and elevated coronary calcium score.  Family history of CAD in father's family.  Was changed from lovastatin to crestor 40 mg which was decreased to 20 mg and eventually she stopped it due to side effects.  Is now taking cholest care without side effects.    In terms of physical activity, walks on a regular basis and tries to do 5K steps/day.    Cardiovascular Risk Factors:  1. Smoking status: Never smoker  2. Type II Diabetes Mellitus: no   Lab Results   Component Value Date/Time    HBA1C 5.9 (H) 06/23/2023 01:06 PM    HBA1C 5.6 06/07/2022 01:37 PM     3. Hypertension: Yes  4. Dyslipidemia: Yes   Cholesterol,Tot   Date Value Ref Range Status   06/23/2023 151 100 - 199 mg/dL Final     LDL   Date Value Ref Range Status   06/23/2023 60 <100 mg/dL Final     HDL   Date Value Ref Range Status   06/23/2023 31 (A) >=40 mg/dL Final     Triglycerides   Date Value Ref Range Status   06/23/2023 301 (H) 0 - 149 mg/dL Final     5. Family history of early Coronary Artery Disease in a first degree relative (Male less than 55 years of age; Female less than 65 years of age): CAD in paternal family  6.  Obesity and/or Metabolic Syndrome: No  7. Sedentary lifestyle: No      Review of Systems    Constitutional: Positive for malaise/fatigue. Negative for decreased appetite, diaphoresis, fever and night sweats.   HENT:  Negative for congestion and sore throat.    Eyes:  Negative for blurred vision and double vision.   Cardiovascular:  Negative for chest pain, cyanosis, dyspnea on exertion, irregular heartbeat, leg swelling, near-syncope, orthopnea, palpitations, paroxysmal nocturnal dyspnea and syncope.   Respiratory:  Negative for cough, shortness of breath, sleep disturbances due to breathing and wheezing.    Endocrine: Negative for cold intolerance and heat intolerance.   Musculoskeletal:  Negative for back pain, falls and myalgias.   Gastrointestinal:  Positive for heartburn. Negative for bloating, constipation, diarrhea, nausea and vomiting.   Genitourinary:  Negative for dysuria and flank pain.   Neurological:  Negative for excessive daytime sleepiness, dizziness, headaches, light-headedness, loss of balance, numbness, paresthesias and weakness.       Past Medical History:   Diagnosis Date    Atypical chest pain 1/23/2014    Cataract     Family history of coronary artery disease 1/23/2014    Hyperlipidemia     Hypertension     Hypothyroidism, postop     Pain of both hip joints 1/20/2017    Prediabetes 7/15/2019      Ref. Range 12/13/2021 11:48  Glycohemoglobin Latest Ref Range: 4.0 - 5.6 % 5.3  Estim. Avg Glu Latest Units: mg/dL 105   A1c 6.0 in mid August 2019. No prior A1c checks, she denies prior knowledge of pre-diabetes. She reports stable weight and has a normal BMI. She has had a decompensation of her triglycerides on recent analysis. No blurred vision, polyuria, or polydipsia. She has brought down h         Past Surgical History:   Procedure Laterality Date    APPENDECTOMY      LAMINOTOMY      cervical fusion; disc bulge?    PRIMARY C SECTION      cervix repair and c section    THYROIDECTOMY      TONSILLECTOMY      US-CYST ASPIRATION-BREAST INITIAL Left 1990's         Current Outpatient  Medications   Medication Sig Dispense Refill    lovastatin (MEVACOR) 20 MG Tab Take 1 Tablet by mouth every evening. 100 Tablet 3    ezetimibe (ZETIA) 10 MG Tab Take 1 Tablet by mouth every day. 100 Tablet 3    Omega-3 Fatty Acids (FISH OIL) 1000 MG Cap capsule Take 2 Capsules by mouth 2 times a day. 400 Capsule 0    Phytosterol Esters (CHOLEST CARE PO) Take 800 mg by mouth 3 times a day. CholestaCare- Propriatery Sterol Blend      coenzyme Q-10 100 MG Cap capsule Take 100 mg by mouth every day.      levothyroxine (SYNTHROID) 50 MCG Tab Take 1 50 mcg 5 days per week and 2 tabs on Tues/Thurs 124 Tablet 3    losartan (COZAAR) 100 MG Tab TAKE ONE TABLET BY MOUTH ONE TIME DAILY 100 Tablet 3    aspirin EC (ECOTRIN) 81 MG Tablet Delayed Response Take 1 Tablet by mouth every day. 100 Tablet 3    Multiple Vitamins-Minerals (PRESERVISION AREDS PO) Take  by mouth.      triamterene-hctz (MAXZIDE-25/DYAZIDE) 37.5-25 MG Tab Take 1 Tablet by mouth every day. Non child proof cap 100 Tablet 3    metoprolol SR (TOPROL XL) 25 MG TABLET SR 24 HR TAKE ONE TABLET BY MOUTH EVERY  Tablet 3    Cholecalciferol (VITAMIN D3) 2000 UNIT Cap Take 1 Capsule by mouth every day. 100 Capsule 3     No current facility-administered medications for this visit.         Allergies   Allergen Reactions    Atorvastatin     Penicillins          Family History   Problem Relation Age of Onset    Cancer Maternal Aunt         breast    Stroke Mother         TIA    Heart Disease Father          Social History     Socioeconomic History    Marital status:      Spouse name: Not on file    Number of children: Not on file    Years of education: Not on file    Highest education level: 12th grade   Occupational History    Not on file   Tobacco Use    Smoking status: Never    Smokeless tobacco: Never    Tobacco comments:     continued abstinence   Vaping Use    Vaping Use: Never used   Substance and Sexual Activity    Alcohol use: Yes     Alcohol/week: 0.0  "- 0.6 oz     Comment: \"maybe twice a year\"    Drug use: No    Sexual activity: Not Currently     Partners: Male     Birth control/protection: Post-Menopausal   Other Topics Concern    Not on file   Social History Narrative    Neli was born and raised in [place]. 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 She has a daughter, Deena Kinney (46, [facility]). Hobbies include reading, she has a tower garden in the yard, she enjoys playing online games.     Social Determinants of Health     Financial Resource Strain: Low Risk  (12/8/2020)    Overall Financial Resource Strain (CARDIA)     Difficulty of Paying Living Expenses: Not hard at all   Food Insecurity: No Food Insecurity (12/8/2020)    Hunger Vital Sign     Worried About Running Out of Food in the Last Year: Never true     Ran Out of Food in the Last Year: Never true   Transportation Needs: No Transportation Needs (12/8/2020)    PRAPARE - Transportation     Lack of Transportation (Medical): No     Lack of Transportation (Non-Medical): No   Physical Activity: Sufficiently Active (12/8/2020)    Exercise Vital Sign     Days of Exercise per Week: 6 days     Minutes of Exercise per Session: 30 min   Stress: No Stress Concern Present (12/8/2020)    [place] [employer] - Occupational Stress Questionnaire     Feeling of Stress : Not at all   Social Connections: Socially Isolated (12/8/2020)    Social Connection and Isolation Panel [NHANES]     Frequency of Communication with Friends and Family: More than three times a week     Frequency of Social Gatherings with Friends and Family: Patient refused     Attends Sikh Services: Never     Active Member of Clubs or Organizations: Not on file     Attends Club or Organization Meetings: Never     Marital Status:    Intimate Partner Violence: Not on file   Housing Stability: Unknown (12/8/2020)    Housing Stability Vital Sign     " "Unable to Pay for Housing in the Last Year: No     Number of Places Lived in the Last Year: 1     Unstable Housing in the Last Year: Not on file         Physical Exam:  Ambulatory Vitals  /70 (BP Location: Left arm, Patient Position: Sitting, BP Cuff Size: Adult)   Pulse 75   Resp 12   Ht 1.562 m (5' 1.5\")   Wt 55.8 kg (123 lb)   SpO2 95%    Oxygen Therapy:  Pulse Oximetry: 95 %  BP Readings from Last 4 Encounters:   07/05/23 116/70   06/27/23 112/60   02/23/23 110/70   12/20/22 124/62       Weight/BMI: Body mass index is 22.86 kg/m².  Wt Readings from Last 4 Encounters:   07/05/23 55.8 kg (123 lb)   06/27/23 55.3 kg (122 lb)   02/23/23 57 kg (125 lb 11.2 oz)   12/20/22 56.2 kg (124 lb)         General: Well appearing and in no apparent distress  Eyes: nl conjunctiva, no icteric sclera  ENT: normal external appearance of ears  Neck: no visible JVP,  no carotid bruits  Lungs: normal respiratory effort, CTAB  Heart: RRR, no murmurs, no rubs or gallops,  no edema bilateral lower extremities.   Abdomen: soft, non tender, non distended, no masses  Extremities/MSK: no clubbing, no cyanosis  Neurological: No focal sensory deficits  Psychiatric: Appropriate affect, A/O x 3, intact judgement and insight  Skin: Warm extremities      Lab Data Review:  Lab Results   Component Value Date/Time    CHOLSTRLTOT 151 06/23/2023 01:06 PM    LDL 60 06/23/2023 01:06 PM    HDL 31 (A) 06/23/2023 01:06 PM    TRIGLYCERIDE 301 (H) 06/23/2023 01:06 PM       Lab Results   Component Value Date/Time    SODIUM 137 06/23/2023 01:06 PM    POTASSIUM 4.1 06/23/2023 01:06 PM    CHLORIDE 99 06/23/2023 01:06 PM    CO2 26 06/23/2023 01:06 PM    GLUCOSE 94 06/23/2023 01:06 PM    BUN 28 (H) 06/23/2023 01:06 PM    CREATININE 0.98 06/23/2023 01:06 PM     Lab Results   Component Value Date/Time    ALKPHOSPHAT 105 (H) 06/23/2023 01:06 PM    ASTSGOT 25 06/23/2023 01:06 PM    ALTSGPT 29 06/23/2023 01:06 PM    TBILIRUBIN 0.4 06/23/2023 01:06 PM      Lab " Results   Component Value Date/Time    WBC 9.2 06/23/2023 01:06 PM     Lab Results   Component Value Date/Time    HBA1C 5.9 (H) 06/23/2023 01:06 PM    HBA1C 5.6 06/07/2022 01:37 PM         Cardiac Imaging and Procedures Review:    EKG dated 7/5/2023: My personal interpretation is sinus rhythm    Nuclear Perfusion Imaging (11/16/2005):   No evidence of ischemia or infarction  LVEF 68%      Radiology test Review:  [facility] (2/24/2023):   Coronary calcification:  LMA - 0  LCX - 47  LAD - 304  RCA - 1     Total Calcium Score: 352      Assessment & Plan     1. Thoracic aorta atherosclerosis (HCC)  EKG      2. Essential hypertension        3. Mixed hyperlipidemia  lovastatin (MEVACOR) 20 MG Tab    ezetimibe (ZETIA) 10 MG Tab    Omega-3 Fatty Acids (FISH OIL) 1000 MG Cap capsule      4. Elevated coronary artery calcium score  lovastatin (MEVACOR) 20 MG Tab    ezetimibe (ZETIA) 10 MG Tab            Shared Medical Decision Making:  We reviewed her risk factors and aforementioned test results in detail.  Intolerant to high intensity statins.  Was able to tolerate lovastatin without any side effects.  After discussion, discontinue Crestor and restart lovastatin 20 mg daily.  Also start Zetia 10 mg daily with goal LDL less than 70 which we discussed.    For elevated triglycerides, recommend 4 g fish oil which patient is in agreement with.    Blood pressure well controlled on current regimen.    I have independently interpreted test results and discussed results and management with the patient.    All of patient's excellent questions were answered to the best of my knowledge and to her satisfaction.  It was a pleasure seeing Ms. Neli Hernandez in my clinic today. Return in about 1 year (around 7/5/2024). Patient is aware to call the cardiology clinic with any questions or concerns.      Cristi Villa MD  [facility]  [facility], [address]  [address] [place] [address], [address]  [address]  Phone: [phone]  Fax: [phone]    Please note that this dictation was created using voice recognition software. I have made every reasonable attempt to correct obvious errors, but it is possible there are errors of grammar and possibly content that I did not discover before finalizing the note.

## 2023-10-23 ENCOUNTER — APPOINTMENT (OUTPATIENT)
Dept: MEDICAL GROUP | Facility: PHYSICIAN GROUP | Age: 78
End: 2023-10-23
Payer: MEDICARE

## 2023-11-27 DIAGNOSIS — I10 BENIGN HYPERTENSION: ICD-10-CM

## 2023-11-27 RX ORDER — TRIAMTERENE AND HYDROCHLOROTHIAZIDE 37.5; 25 MG/1; MG/1
1 TABLET ORAL
Qty: 100 TABLET | Refills: 3 | Status: SHIPPED | OUTPATIENT
Start: 2023-11-27 | End: 2024-02-05 | Stop reason: SDUPTHER

## 2023-11-29 ENCOUNTER — HOSPITAL ENCOUNTER (OUTPATIENT)
Dept: LAB | Facility: MEDICAL CENTER | Age: 78
End: 2023-11-29
Attending: INTERNAL MEDICINE
Payer: MEDICARE

## 2023-11-29 DIAGNOSIS — R74.8 ELEVATED ALKALINE PHOSPHATASE LEVEL: ICD-10-CM

## 2023-11-29 DIAGNOSIS — E78.2 MIXED HYPERLIPIDEMIA: ICD-10-CM

## 2023-11-29 DIAGNOSIS — R73.03 PREDIABETES: ICD-10-CM

## 2023-11-29 LAB
EST. AVERAGE GLUCOSE BLD GHB EST-MCNC: 126 MG/DL
HBA1C MFR BLD: 6 % (ref 4–5.6)

## 2023-11-29 PROCEDURE — 84075 ASSAY ALKALINE PHOSPHATASE: CPT

## 2023-11-29 PROCEDURE — 36415 COLL VENOUS BLD VENIPUNCTURE: CPT

## 2023-11-29 PROCEDURE — 83036 HEMOGLOBIN GLYCOSYLATED A1C: CPT

## 2023-11-29 PROCEDURE — 84080 ASSAY ALKALINE PHOSPHATASES: CPT

## 2023-11-29 PROCEDURE — 80061 LIPID PANEL: CPT

## 2023-11-30 LAB
CHOLEST SERPL-MCNC: 161 MG/DL (ref 100–199)
FASTING STATUS PATIENT QL REPORTED: NORMAL
HDLC SERPL-MCNC: 32 MG/DL
LDLC SERPL CALC-MCNC: 68 MG/DL
TRIGL SERPL-MCNC: 307 MG/DL (ref 0–149)

## 2023-12-03 LAB
ALP BONE SERPL-CCNC: 29 U/L (ref 0–55)
ALP ISOS SERPL HS-CCNC: 0 U/L
ALP LIVER SERPL-CCNC: 66 U/L (ref 0–94)
ALP SERPL-CCNC: 95 U/L (ref 40–120)

## 2023-12-05 ENCOUNTER — OFFICE VISIT (OUTPATIENT)
Dept: MEDICAL GROUP | Facility: PHYSICIAN GROUP | Age: 78
End: 2023-12-05
Payer: MEDICARE

## 2023-12-05 VITALS
HEIGHT: 62 IN | HEART RATE: 85 BPM | SYSTOLIC BLOOD PRESSURE: 114 MMHG | OXYGEN SATURATION: 94 % | WEIGHT: 127.3 LBS | DIASTOLIC BLOOD PRESSURE: 64 MMHG | RESPIRATION RATE: 12 BRPM | TEMPERATURE: 98.1 F | BODY MASS INDEX: 23.42 KG/M2

## 2023-12-05 DIAGNOSIS — Z78.9 POLST (PHYSICIAN ORDERS FOR LIFE-SUSTAINING TREATMENT): ICD-10-CM

## 2023-12-05 DIAGNOSIS — E78.2 MIXED HYPERLIPIDEMIA: ICD-10-CM

## 2023-12-05 DIAGNOSIS — E03.9 ACQUIRED HYPOTHYROIDISM: ICD-10-CM

## 2023-12-05 DIAGNOSIS — R73.03 PREDIABETES: ICD-10-CM

## 2023-12-05 PROCEDURE — 99214 OFFICE O/P EST MOD 30 MIN: CPT | Performed by: INTERNAL MEDICINE

## 2023-12-05 PROCEDURE — 3078F DIAST BP <80 MM HG: CPT | Performed by: INTERNAL MEDICINE

## 2023-12-05 PROCEDURE — 3074F SYST BP LT 130 MM HG: CPT | Performed by: INTERNAL MEDICINE

## 2023-12-05 ASSESSMENT — FIBROSIS 4 INDEX: FIB4 SCORE: 1.23

## 2023-12-06 NOTE — ASSESSMENT & PLAN NOTE
She elects to be full code with an attempt at resuscitation.  If it is determined that she is not can make a meaningful return of quality of life then she would want to be transition to hospice.  She would like to die at home if possible.  Her daughter Deena Koo is her primary medical power of  and her friend Concepción Wiggins is her secondary medical power of . Please see POLST scanned into her chart.

## 2023-12-06 NOTE — PROGRESS NOTES
Subjective:   Chief Complaint/History of Present Illness:  Neli Hernandez is a 78 y.o. female established patient who presents today to discuss medical problems as listed below. Neli is unaccompanied for today's visit.    Problem   POLST- FULL CODE    She elects to be full code with an attempt at resuscitation.  If it is determined that she is not can make a meaningful return of quality of life then she would want to be transition to hospice.  She would like to die at home if possible.  Her daughter Deena Koo is her primary medical power of  and her friend Concepción Wiggins is her secondary medical power of .     Prediabetes     Latest Reference Range & Units 11/29/23 09:18   Glycohemoglobin 4.0 - 5.6 % 6.0 (H)     A1c 6.0 in mid August 2019. No prior A1c checks, she denies prior knowledge of pre-diabetes. She reports stable weight and has a normal BMI. She has had a decompensation of her triglycerides on recent analysis. No blurred vision, polyuria, or polydipsia. She has brought down her A1c through dietary changes.     Acquired Hypothyroidism     Latest Reference Range & Units 06/23/23 13:06   TSH 0.380 - 5.330 uIU/mL 2.080   Free T-4 0.93 - 1.70 ng/dL 1.65     Maintained on levothyroxine 50 mcg for many years, well controlled. She previously followed with endocrinology, Dr. Win. She denies any fluctuations or changes to her dose for quite some time. She denies any signs or symptoms of over treatment or under treatment at this time.    Current regimen: levothyroxine 50 mcg 5 days per week and 100 mcg on Tues/Thurs     Mixed Hyperlipidemia     Latest Reference Range & Units 11/29/23 09:18   Cholesterol,Tot 100 - 199 mg/dL 161   Triglycerides 0 - 149 mg/dL 307 (H)   HDL >=40 mg/dL 32 !   LDL <100 mg/dL 68     The 10-year ASCVD risk score (Shayan SOLIS, et al., 2019) is: 19.9%    CT cardiac score (2023):  Coronary calcification:  LMA - 0  LCX - 47  LAD - 304  RCA - 1     Total Calcium  "Score: 352    She reports using lovastatin since her 60's. It was started due to elevated cholesterol values, she denies any history of cardiovascular or cerebrovascular disease.     She reports that her triglycerides have never been as high as they are now. No history of pancreatitis. She has never been on fenofibrate medications.  Elevated CT cardiac score so she is now on statin therapy.    Current regimen: Lovastatin 20 mg nightly plus ezetimibe 10 mg daily plus fish oil 4 g daily  Previous regimen: Lovastatin 40 mg daily          Current Medications:  Current Outpatient Medications Ordered in Epic   Medication Sig Dispense Refill    triamterene-hctz (MAXZIDE-25/DYAZIDE) 37.5-25 MG Tab TAKE ONE TABLET BY MOUTH EVERY  Tablet 3    lovastatin (MEVACOR) 20 MG Tab Take 1 Tablet by mouth every evening. 100 Tablet 3    ezetimibe (ZETIA) 10 MG Tab Take 1 Tablet by mouth every day. 100 Tablet 3    metoprolol SR (TOPROL XL) 25 MG TABLET SR 24 HR Take 1 Tablet by mouth every day. 100 Tablet 3    Phytosterol Esters (CHOLEST CARE PO) Take 800 mg by mouth 3 times a day. CholestaCare- Propriatery Sterol Blend      coenzyme Q-10 100 MG Cap capsule Take 100 mg by mouth every day.      levothyroxine (SYNTHROID) 50 MCG Tab Take 1 50 mcg 5 days per week and 2 tabs on Tues/Thurs 124 Tablet 3    losartan (COZAAR) 100 MG Tab TAKE ONE TABLET BY MOUTH ONE TIME DAILY 100 Tablet 3    aspirin EC (ECOTRIN) 81 MG Tablet Delayed Response Take 1 Tablet by mouth every day. 100 Tablet 3    Cholecalciferol (VITAMIN D3) 2000 UNIT Cap Take 1 Capsule by mouth every day. 100 Capsule 3     No current [facility]-ordered facility-administered medications on file.          Objective:   Physical Exam:    Vitals: /64 (BP Location: Right arm, Patient Position: Sitting, BP Cuff Size: Adult)   Pulse 85   Temp 36.7 °C (98.1 °F) (Temporal)   Resp 12   Ht 1.562 m (5' 1.5\")   Wt 57.7 kg (127 lb 4.8 oz)   SpO2 94%    BMI: Body mass index is 23.66 " kg/m².  Physical Exam  Constitutional:       General: She is not in acute distress.     Appearance: Normal appearance. She is not ill-appearing.   HENT:      Right Ear: External ear normal.      Left Ear: External ear normal.   Eyes:      General: No scleral icterus.     Conjunctiva/sclera: Conjunctivae normal.   Cardiovascular:      Rate and Rhythm: Normal rate and regular rhythm.      Pulses: Normal pulses.      Heart sounds: No murmur heard.  Pulmonary:      Effort: Pulmonary effort is normal. No respiratory distress.      Breath sounds: No wheezing, rhonchi or rales.   Abdominal:      General: Bowel sounds are normal. There is no distension.      Palpations: Abdomen is soft.      Tenderness: There is no abdominal tenderness.   Musculoskeletal:      Right lower leg: No edema.      Left lower leg: No edema.   Lymphadenopathy:      Cervical: No cervical adenopathy.   Skin:     General: Skin is warm and dry.      Findings: No rash.   Neurological:      Gait: Gait normal.   Psychiatric:         Mood and Affect: Mood normal.         Behavior: Behavior normal.         Thought Content: Thought content normal.         Judgment: Judgment normal.            Assessment and Plan:   Neli is a 78 y.o. female with the following:  Problem List Items Addressed This Visit       Acquired hypothyroidism     Chronic stable problem, thyroid well-controlled current medical therapy, no signs or symptoms of overtreatment under treatment, continue levothyroxine 50 mcg 5 days/week and 100 mcg 2 days/week.  Update thyroid panel before follow-up visit.         Relevant Orders    FREE THYROXINE    TSH    VITAMIN B12    VITAMIN D,25 HYDROXY (DEFICIENCY)    Comp Metabolic Panel    CBC WITH DIFFERENTIAL    Mixed hyperlipidemia     Ongoing problem, LDL is now at goal below 70, HDL still running low and triglycerides elevated.  She will continue current medical regiment with lovastatin 20 mg, Zetia 10 mg daily and fish oil 4 g daily.   Discussed that she could try bempedoic acid or nexlizet if triglycerides still remain above goal.         Relevant Orders    Lipid Profile    POLST- FULL CODE     She elects to be full code with an attempt at resuscitation.  If it is determined that she is not can make a meaningful return of quality of life then she would want to be transition to hospice.  She would like to die at home if possible.  Her daughter Deena Koo is her primary medical power of  and her friend Concepción Wiggins is her secondary medical power of . Please see POLST scanned into her chart.         Prediabetes     Chronic stable problem, A1c to continue to be around 6.0, previously 5.9.  She relates this to her daughter's 50th birthday party and Thanksgiving holiday.  She continues with dietary approach to her prediabetes.         Relevant Orders    HEMOGLOBIN A1C          RTC: Return in about 6 months (around 6/5/2024).    I spent a total of 34 minutes with record review, exam, communication with the patient, communication with other providers, and documentation of this encounter.    PLEASE NOTE: This dictation was created using voice recognition software. I have made every reasonable attempt to correct obvious errors, but I expect that there are errors of grammar and possibly content that I did not discover before finalizing the note.      Fariha Gold, DO  Geriatric and Internal Medicine  [facility]

## 2023-12-06 NOTE — ASSESSMENT & PLAN NOTE
Ongoing problem, LDL is now at goal below 70, HDL still running low and triglycerides elevated.  She will continue current medical regiment with lovastatin 20 mg, Zetia 10 mg daily and fish oil 4 g daily.  Discussed that she could try bempedoic acid or nexlizet if triglycerides still remain above goal.

## 2023-12-06 NOTE — ASSESSMENT & PLAN NOTE
Chronic stable problem, thyroid well-controlled current medical therapy, no signs or symptoms of overtreatment under treatment, continue levothyroxine 50 mcg 5 days/week and 100 mcg 2 days/week.  Update thyroid panel before follow-up visit.

## 2023-12-06 NOTE — ASSESSMENT & PLAN NOTE
Chronic stable problem, A1c to continue to be around 6.0, previously 5.9.  She relates this to her daughter's 50th birthday party and Thanksgiving holiday.  She continues with dietary approach to her prediabetes.

## 2024-01-08 PROBLEM — Z78.0 MENOPAUSE: Status: RESOLVED | Noted: 2019-11-27 | Resolved: 2024-01-08

## 2024-01-08 PROBLEM — R93.1 ELEVATED CORONARY ARTERY CALCIUM SCORE: Status: RESOLVED | Noted: 2023-07-05 | Resolved: 2024-01-08

## 2024-01-08 PROBLEM — M79.675 PAIN OF LEFT GREAT TOE: Status: RESOLVED | Noted: 2022-12-20 | Resolved: 2024-01-08

## 2024-01-08 ASSESSMENT — ACTIVITIES OF DAILY LIVING (ADL): BATHING_REQUIRES_ASSISTANCE: 0

## 2024-01-08 ASSESSMENT — ENCOUNTER SYMPTOMS: GENERAL WELL-BEING: GOOD

## 2024-01-08 ASSESSMENT — PATIENT HEALTH QUESTIONNAIRE - PHQ9: CLINICAL INTERPRETATION OF PHQ2 SCORE: 0

## 2024-01-08 NOTE — ASSESSMENT & PLAN NOTE
Chronic, stable. Currently taking levothyroxine 50 mcg daily as prescribed. Denies fatigue, palpitations, hair and skin changes, temperature intolerance, changes in bowel habits, and weight loss or weight gain. Followed by endocrinology, Dr. Win.

## 2024-01-08 NOTE — ASSESSMENT & PLAN NOTE
Chronic, stable. Currently taking 2,000 units of cholecalciferol daily. Followed by primary care provider.

## 2024-01-08 NOTE — ASSESSMENT & PLAN NOTE
Chronic, stable. Currently taking ezetimibe 10 mg daily. She is not exercising regularly. Reports that her goal is 5,000 steps a day. Offered [employer] gym resources. Denies chest pain, claudication, and dizziness.

## 2024-01-08 NOTE — ASSESSMENT & PLAN NOTE
Chronic, stable. Currently taking losartan 100 mg, metoprolol SR 25 mg, and triamterene-hctz 37.5-25 mg daily. Denies chest pain, lightheadedness, palpitations, and lower extremity edema. Followed by cardiology, Dr. Villa.

## 2024-01-08 NOTE — ASSESSMENT & PLAN NOTE
"Chronic, stable. Reviewed CT cardiac scoring from February 2023: \"moderate descending thoracic aortic atherosclerosis.\" Denies chest pain, pain with ambulation, and weakness of extremities. Currently taking aspirin 81 mg daily. Followed by cardiology, Dr. Villa.     "

## 2024-01-08 NOTE — ASSESSMENT & PLAN NOTE
"Chronic, stable. Reviewed CT cardiac scoring from February 2023: \"moderate descending thoracic aortic atherosclerosis.\" Currently taking aspirin 81 mg daily.  "

## 2024-01-08 NOTE — ASSESSMENT & PLAN NOTE
"Chronic, stable. Reviewed DEXA scan from June 2022: \"according to the World Health Organization classification, bone mineral density in this patient is osteopenia lumbar spine with a significant increase in the bone mineral density in comparison to prior study. There is osteopenia proximal left femur.\"  "

## 2024-01-08 NOTE — ASSESSMENT & PLAN NOTE
Chronic, stable. Most recent hemoglobin A1C was 6.0 in November 2023. Followed by her primary care provider.

## 2024-01-10 ENCOUNTER — OFFICE VISIT (OUTPATIENT)
Dept: FAMILY PLANNING/WOMEN'S HEALTH CLINIC | Facility: PHYSICIAN GROUP | Age: 79
End: 2024-01-10
Attending: FAMILY MEDICINE
Payer: MEDICARE

## 2024-01-10 VITALS
WEIGHT: 123 LBS | BODY MASS INDEX: 22.63 KG/M2 | HEIGHT: 62 IN | SYSTOLIC BLOOD PRESSURE: 138 MMHG | DIASTOLIC BLOOD PRESSURE: 76 MMHG

## 2024-01-10 DIAGNOSIS — M85.89 OSTEOPENIA OF MULTIPLE SITES: ICD-10-CM

## 2024-01-10 DIAGNOSIS — E55.9 VITAMIN D DEFICIENCY: ICD-10-CM

## 2024-01-10 DIAGNOSIS — I10 ESSENTIAL HYPERTENSION: ICD-10-CM

## 2024-01-10 DIAGNOSIS — E78.2 MIXED HYPERLIPIDEMIA: ICD-10-CM

## 2024-01-10 DIAGNOSIS — E03.9 ACQUIRED HYPOTHYROIDISM: ICD-10-CM

## 2024-01-10 DIAGNOSIS — I70.0 THORACIC AORTA ATHEROSCLEROSIS (HCC): ICD-10-CM

## 2024-01-10 DIAGNOSIS — R73.03 PREDIABETES: ICD-10-CM

## 2024-01-10 PROBLEM — R19.4 CHANGE IN STOOL HABITS: Status: RESOLVED | Noted: 2022-12-20 | Resolved: 2024-01-10

## 2024-01-10 PROBLEM — F33.0 MAJOR DEPRESSIVE DISORDER, RECURRENT EPISODE, MILD (HCC): Status: RESOLVED | Noted: 2023-02-23 | Resolved: 2024-01-10

## 2024-01-10 PROBLEM — R94.30 NONSPECIFIC ABNORMAL FUNCTION STUDY, CARDIOVASCULAR: Status: RESOLVED | Noted: 2022-12-20 | Resolved: 2024-01-10

## 2024-01-10 PROCEDURE — G0439 PPPS, SUBSEQ VISIT: HCPCS

## 2024-01-10 PROCEDURE — 3075F SYST BP GE 130 - 139MM HG: CPT

## 2024-01-10 PROCEDURE — 1125F AMNT PAIN NOTED PAIN PRSNT: CPT

## 2024-01-10 PROCEDURE — 3078F DIAST BP <80 MM HG: CPT

## 2024-01-10 SDOH — ECONOMIC STABILITY: FOOD INSECURITY: WITHIN THE PAST 12 MONTHS, YOU WORRIED THAT YOUR FOOD WOULD RUN OUT BEFORE YOU GOT MONEY TO BUY MORE.: NEVER TRUE

## 2024-01-10 SDOH — SOCIAL STABILITY: SOCIAL NETWORK: HOW OFTEN DO YOU ATTEND CHURCH OR RELIGIOUS SERVICES?: NEVER

## 2024-01-10 SDOH — SOCIAL STABILITY: SOCIAL NETWORK: ARE YOU MARRIED, WIDOWED, DIVORCED, SEPARATED, NEVER MARRIED, OR LIVING WITH A PARTNER?: DIVORCED

## 2024-01-10 SDOH — ECONOMIC STABILITY: HOUSING INSECURITY: IN THE LAST 12 MONTHS, HOW MANY PLACES HAVE YOU LIVED?: 1

## 2024-01-10 SDOH — HEALTH STABILITY: MENTAL HEALTH: HOW OFTEN DO YOU HAVE A DRINK CONTAINING ALCOHOL?: NEVER

## 2024-01-10 SDOH — ECONOMIC STABILITY: INCOME INSECURITY: IN THE LAST 12 MONTHS, WAS THERE A TIME WHEN YOU WERE NOT ABLE TO PAY THE MORTGAGE OR RENT ON TIME?: NO

## 2024-01-10 SDOH — HEALTH STABILITY: MENTAL HEALTH: HOW OFTEN DO YOU HAVE 6 OR MORE DRINKS ON ONE OCCASION?: NEVER

## 2024-01-10 SDOH — SOCIAL STABILITY: SOCIAL NETWORK
DO YOU BELONG TO ANY CLUBS OR ORGANIZATIONS SUCH AS CHURCH GROUPS UNIONS, FRATERNAL OR ATHLETIC GROUPS, OR SCHOOL GROUPS?: NO

## 2024-01-10 SDOH — SOCIAL STABILITY: SOCIAL NETWORK: HOW OFTEN DO YOU ATTENT MEETINGS OF THE CLUB OR ORGANIZATION YOU BELONG TO?: 1 TO 4 TIMES PER YEAR

## 2024-01-10 SDOH — ECONOMIC STABILITY: INCOME INSECURITY: IN THE PAST 12 MONTHS, HAS THE ELECTRIC, GAS, OIL, OR WATER COMPANY THREATENED TO SHUT OFF SERVICE IN YOUR HOME?: NO

## 2024-01-10 SDOH — ECONOMIC STABILITY: FOOD INSECURITY: WITHIN THE PAST 12 MONTHS, THE FOOD YOU BOUGHT JUST DIDN'T LAST AND YOU DIDN'T HAVE MONEY TO GET MORE.: NEVER TRUE

## 2024-01-10 SDOH — SOCIAL STABILITY: SOCIAL NETWORK

## 2024-01-10 SDOH — ECONOMIC STABILITY: INCOME INSECURITY: HOW HARD IS IT FOR YOU TO PAY FOR THE VERY BASICS LIKE FOOD, HOUSING, MEDICAL CARE, AND HEATING?: NOT HARD AT ALL

## 2024-01-10 SDOH — ECONOMIC STABILITY: TRANSPORTATION INSECURITY
IN THE PAST 12 MONTHS, HAS LACK OF TRANSPORTATION KEPT YOU FROM MEETINGS, WORK, OR FROM GETTING THINGS NEEDED FOR DAILY LIVING?: NO

## 2024-01-10 SDOH — HEALTH STABILITY: MENTAL HEALTH: HOW MANY STANDARD DRINKS CONTAINING ALCOHOL DO YOU HAVE ON A TYPICAL DAY?: PATIENT DOES NOT DRINK

## 2024-01-10 SDOH — SOCIAL STABILITY: SOCIAL NETWORK
IN A TYPICAL WEEK, HOW MANY TIMES DO YOU TALK ON THE PHONE WITH FAMILY, FRIENDS, OR NEIGHBORS?: MORE THAN THREE TIMES A WEEK

## 2024-01-10 ASSESSMENT — FIBROSIS 4 INDEX: FIB4 SCORE: 1.23

## 2024-01-10 ASSESSMENT — LIFESTYLE VARIABLES
AUDIT-C TOTAL SCORE: 0
SKIP TO QUESTIONS 9-10: 1

## 2024-01-10 ASSESSMENT — PAIN SCALES - GENERAL: PAINLEVEL: 2=MINIMAL-SLIGHT

## 2024-01-10 NOTE — PROGRESS NOTES
Comprehensive Health Assessment Program     Neli Hernandez is a 78 y.o. here for her comprehensive health assessment.    Patient Active Problem List    Diagnosis Date Noted    Thoracic aorta atherosclerosis (HCC) 06/27/2023    Elevated alkaline phosphatase level 06/27/2023    BMI 23.0-23.9, adult 12/20/2022    Chest wall asymmetry 07/19/2021    Prediabetes 07/15/2019    Essential hypertension 07/20/2017    Osteopenia of multiple sites 11/23/2015    Cataract 08/31/2015    Acquired hypothyroidism 04/06/2015    Mixed hyperlipidemia 01/23/2014    Vitamin D deficiency 04/11/2011       Current Outpatient Medications   Medication Sig Dispense Refill    triamterene-hctz (MAXZIDE-25/DYAZIDE) 37.5-25 MG Tab TAKE ONE TABLET BY MOUTH EVERY  Tablet 3    lovastatin (MEVACOR) 20 MG Tab Take 1 Tablet by mouth every evening. 100 Tablet 3    ezetimibe (ZETIA) 10 MG Tab Take 1 Tablet by mouth every day. 100 Tablet 3    metoprolol SR (TOPROL XL) 25 MG TABLET SR 24 HR Take 1 Tablet by mouth every day. 100 Tablet 3    Phytosterol Esters (CHOLEST CARE PO) Take 800 mg by mouth 3 times a day. CholestaCare- Propriatery Sterol Blend      coenzyme Q-10 100 MG Cap capsule Take 100 mg by mouth every day.      levothyroxine (SYNTHROID) 50 MCG Tab Take 1 50 mcg 5 days per week and 2 tabs on Tues/Thurs 124 Tablet 3    losartan (COZAAR) 100 MG Tab TAKE ONE TABLET BY MOUTH ONE TIME DAILY 100 Tablet 3    aspirin EC (ECOTRIN) 81 MG Tablet Delayed Response Take 1 Tablet by mouth every day. 100 Tablet 3    Cholecalciferol (VITAMIN D3) 2000 UNIT Cap Take 1 Capsule by mouth every day. 100 Capsule 3     No current facility-administered medications for this visit.          Current supplements as per medication list.     Allergies:   Penicillins  Social History     Tobacco Use    Smoking status: Never    Smokeless tobacco: Never    Tobacco comments:     continued abstinence   Vaping Use    Vaping Use: Never used   Substance Use Topics     "Alcohol use: Yes     Alcohol/week: 0.0 - 0.6 oz     Comment: \"maybe twice a year\"    Drug use: No     Family History   Problem Relation Age of Onset    Cancer Maternal Aunt         breast    Stroke Mother         TIA    Heart Disease Father      Neli  has a past medical history of Atypical chest pain (01/23/2014), Cataract, Change in stool habits (12/20/2022), Elevated coronary artery calcium score (07/05/2023), Family history of coronary artery disease (01/23/2014), H/O partial thyroidectomy (01/23/2014), Hyperlipidemia, Hypertension, Hypothyroidism, postop, Major depressive disorder, recurrent episode, mild (HCC) (02/23/2023), Menopause (11/27/2019), Nonspecific abnormal function study, cardiovascular (12/20/2022), Pain of both hip joints (01/20/2017), Pain of left great toe (12/20/2022), and Prediabetes (07/15/2019).    She has no past medical history of Encounter for long-term (current) use of other medications.   Past Surgical History:   Procedure Laterality Date    APPENDECTOMY      LAMINOTOMY      cervical fusion; disc bulge?    PRIMARY C SECTION      cervix repair and c section    THYROIDECTOMY      TONSILLECTOMY      US-CYST ASPIRATION-BREAST INITIAL Left 1990's       Screening:  In the last six months have you experienced any leakage of urine? No    Depression Screening  Little interest or pleasure in doing things?  0 - not at all  Feeling down, depressed , or hopeless? 0 - not at all  Patient Health Questionnaire Score: 0     If depressive symptoms identified deferred to follow up visit unless specifically addressed in assessment and plan.    Interpretation of PHQ-9 Total Score   Score Severity   1-4 No Depression   5-9 Mild Depression   10-14 Moderate Depression   15-19 Moderately Severe Depression   20-27 Severe Depression    Screening for Cognitive Impairment  Do you or any of your friends or family members have any concern about your memory? No  Three Minute Recall (Banana, Sunrise, Chair) 3/3  "   Eric clock face with all 12 numbers and set the hands to show 20 past 8.  Yes    Cognitive concerns identified deferred for follow up unless specifically addressed in assessment and plan.    Fall Risk Assessment  Has the patient had two or more falls in the last year or any fall with injury in the last year?  No    Safety Assessment  Do you always wear your seatbelt?  Yes  Any changes to home needed to function safely? No  Difficulty hearing.  No  Patient counseled about all safety risks that were identified.    Functional Assessment ADLs  Are there any barriers preventing you from cooking for yourself or meeting nutritional needs?  No.    Are there any barriers preventing you from driving safely or obtaining transportation?  No.    Are there any barriers preventing you from using a telephone or calling for help?  No    Are there any barriers preventing you from shopping?  No.    Are there any barriers preventing you from taking care of your own finances?  No    Are there any barriers preventing you from managing your medications?  No    Are there any barriers preventing you from showering, bathing or dressing yourself? No    Are there any barriers preventing you from doing housework or laundry? No  Are there any barriers preventing you from using the toilet?No  Are you currently engaging in any exercise or physical activity?  No. weather    Self-Assessment of Health  What is your perception of your health? Good  Do you sleep more than six hours a night? No  In the past 7 days, how much did pain keep you from doing your normal work? None  Do you spend quality time with family or friends (virtually or in person)? Yes  Do you usually eat a heart healthy diet that constists of a variety of fruits, vegetables, whole grains and fiber? Yes  Do you eat foods high in fat and/or Fast Food more than three times per week? No    Advance Care Planning  Do you have an Advance Directive, Living Will, Durable Power of ,  or POLST? Yes  Advance Directive       is on file      Health Maintenance Summary            Bone Density Scan (Every 2 Years) Next due on 6/6/2024 06/06/2022  DS-BONE DENSITY STUDY (DEXA)    02/11/2020  DS-BONE DENSITY STUDY (DEXA)    10/29/2012  DS-BONE DENSITY STUDY (DEXA)              Annual Wellness Visit (Every 366 Days) Next due on 1/10/2025      01/10/2024  Done    12/20/2022  Level of Service: MO ANNUAL WELLNESS VISIT-INCLUDES PPPS SUBSEQUE*    12/16/2021  Level of Service: MO ANNUAL WELLNESS VISIT-INCLUDES PPPS SUBSEQUE*              IMM DTaP/Tdap/Td Vaccine (2 - Td or Tdap) Next due on 3/27/2029      03/27/2019  Imm Admin: Tdap Vaccine    08/11/2010  Imm Admin: TD Vaccine              Pneumococcal Vaccine: 65+ Years (Series Information) Completed      10/30/2015  Imm Admin: Pneumococcal Conjugate Vaccine (Prevnar/PCV-13)    08/31/2015  Imm Admin: Pneumococcal polysaccharide vaccine (PPSV-23)    03/26/2012  Imm Admin: Pneumococcal Conjugate Vaccine (Prevnar/PCV-13)    03/26/2012  Imm Admin: Pneumococcal polysaccharide vaccine (PPSV-23)              Hepatitis C Screening  Addressed      10/01/2019  Reason not specified              Zoster (Shingles) Vaccines (Series Information) Completed      08/17/2020  Imm Admin: Zoster Vaccine Recombinant (RZV) (SHINGRIX)    06/17/2020  Imm Admin: Zoster Vaccine Recombinant (RZV) (SHINGRIX)              Influenza Vaccine (Series Information) Completed      10/19/2023  Imm Admin: Influenza Vaccine Adult HD    09/19/2022  Imm Admin: Influenza, Unspecified - HISTORICAL DATA    10/07/2021  Imm Admin: Influenza Vaccine Adult HD    09/14/2020  Imm Admin: Influenza (IM) Preservative Free - HISTORICAL DATA    10/24/2019  Imm Admin: Influenza Vaccine Adult HD    Only the first 5 history entries have been loaded, but more history exists.              COVID-19 Vaccine (Series Information) Completed      10/19/2023  Imm Admin: Comirnaty (Covid-19 Vaccine, Mrna, [id number]  Formula)    09/19/2022  Imm Admin: PFIZER BIVALENT SARS-COV-2 VACCINE (12+)    04/06/2022  Imm Admin: PFIZER COOMBS CAP SARS-COV-2 VACCINATION (12+)    09/27/2021  Imm Admin: PFIZER PURPLE CAP SARS-COV-2 VACCINATION (12+)    02/13/2021  Imm Admin: PFIZER PURPLE CAP SARS-COV-2 VACCINATION (12+)    Only the first 5 history entries have been loaded, but more history exists.              Hepatitis A Vaccine (Hep A) (Series Information) Aged Out      No completion history exists for this topic.              HPV Vaccines (Series Information) Aged Out      No completion history exists for this topic.              Polio Vaccine (Inactivated Polio) (Series Information) Aged Out      No completion history exists for this topic.              Meningococcal Immunization (Series Information) Aged Out      No completion history exists for this topic.              Discontinued - Mammogram  Discontinued        Frequency changed to Never automatically (Topic No Longer Applies)    11/05/2021  MA-SCREENING MAMMO BILAT W/TOMOSYNTHESIS W/CAD    10/01/2020  MA-SCREENING MAMMO BILAT W/TOMOSYNTHESIS W/CAD    08/16/2019  MA-SCREENING MAMMO BILAT W/TOMOSYNTHESIS W/CAD    07/25/2018  MA-MAMMO SCREENING BILAT W/JUNIOR W/CAD    Only the first 5 history entries have been loaded, but more history exists.              Discontinued - Colorectal Cancer Screening  Discontinued        Frequency changed to Never automatically (Topic No Longer Applies)    06/21/2021  REFERRAL TO GI FOR COLONOSCOPY    06/21/2021  REFERRAL TO GI FOR COLONOSCOPY    03/29/2016  REFERRAL TO GI FOR COLONOSCOPY              Discontinued - Hepatitis B Vaccine (Hep B)  Discontinued      No completion history exists for this topic.                    Patient Care Team:  Fariha Gold D.O. as PCP - General (Internal Medicine)      Financial Resource Strain: Low Risk  (1/10/2024)    Overall Financial Resource Strain (CARDIA)     Difficulty of Paying Living Expenses: Not hard at  "all      Transportation Needs: No Transportation Needs (1/10/2024)    PRAPARE - Transportation     Lack of Transportation (Medical): No     Lack of Transportation (Non-Medical): No      Food Insecurity: No Food Insecurity (1/10/2024)    Hunger Vital Sign     Worried About Running Out of Food in the Last Year: Never true     Ran Out of Food in the Last Year: Never true        Encounter Vitals  Blood Pressure : 138/76  Weight: 55.8 kg (123 lb)  Height: 157.5 cm (5' 2\")  BMI (Calculated): 22.5  Pain Score: 2=Minimal-Slight     Alert, oriented in no acute distress.  Eye contact is good, speech goal directed, affect calm.    Assessment and Plan. The following treatment and monitoring plan is recommended:    Thoracic aorta atherosclerosis (HCC)  Chronic, stable. Reviewed CT cardiac scoring from February 2023: \"moderate descending thoracic aortic atherosclerosis.\" Currently taking aspirin 81 mg daily. Denies chest pain, pain with ambulation, and weakness of extremities. Followed by cardiology, Dr. Villa.     Essential hypertension  Chronic, stable. Currently taking losartan 100 mg, metoprolol SR 25 mg, and triamterene-hctz 37.5-25 mg daily. Denies chest pain, lightheadedness, palpitations, and lower extremity edema. Followed by cardiology, Dr. Villa.     Mixed hyperlipidemia  Chronic, stable. Currently taking lovastatin 20 mg and ezetimibe 10 mg daily. Reports that her goal is to walk 5,000 steps a day. Offered [facility] gym resources. Denies chest pain, claudication, and dizziness.    Acquired hypothyroidism  Chronic, stable. Currently taking levothyroxine 50 mcg daily as prescribed. Denies fatigue, palpitations, hair and skin changes, temperature intolerance, changes in bowel habits, and weight loss or weight gain. Followed by endocrinology, Dr. Win.     Osteopenia of multiple sites  Chronic, stable. Reviewed DEXA scan from June 2022: \"according to the World Health Organization classification, bone mineral " "density in this patient is osteopenia lumbar spine with a significant increase in the bone mineral density in comparison to prior study. There is osteopenia proximal left femur.\"    Prediabetes  Chronic, stable. No current medication. Most recent hemoglobin A1C was 6.0 in November 2023. Followed by primary care provider.    Vitamin D deficiency  Chronic, stable. Currently taking 2,000 units of cholecalciferol daily. Followed by primary care provider.         Services suggested: No services needed at this time  Health Care Screening: Age-appropriate preventive services recommended by USPTF and ACIP covered by Medicare were discussed today. Services ordered if indicated and agreed upon by the patient.  Referrals offered: Community-based lifestyle interventions to reduce health risks and promote self-management and wellness, fall prevention, nutrition, physical activity, tobacco-use cessation, weight loss, and mental health services as per orders if indicated.    Discussion today about general wellness and lifestyle habits:    Prevent falls and reduce trip hazards; Cautioned about securing or removing rugs.  Have a working fire alarm and carbon monoxide detector.  Engage in regular physical activity and social activities.    Follow-up: Return for appointment with Primary Care Provider as needed.           "

## 2024-01-25 DIAGNOSIS — I10 BENIGN HYPERTENSION: ICD-10-CM

## 2024-01-25 PROCEDURE — RXMED WILLOW AMBULATORY MEDICATION CHARGE: Performed by: INTERNAL MEDICINE

## 2024-01-25 RX ORDER — METOPROLOL SUCCINATE 25 MG/1
25 TABLET, EXTENDED RELEASE ORAL
Qty: 100 TABLET | Refills: 3 | Status: SHIPPED | OUTPATIENT
Start: 2024-01-25

## 2024-01-25 NOTE — TELEPHONE ENCOUNTER
Received request via: Pharmacy    Was the patient seen in the last year in this department? Yes    Does the patient have an active prescription (recently filled or refills available) for medication(s) requested? No    Pharmacy Name: [facility]    Does the patient have snf Plus and need 100 day supply (blood pressure, diabetes and cholesterol meds only)? Yes, quantity updated to 100 days

## 2024-01-26 ENCOUNTER — PHARMACY VISIT (OUTPATIENT)
Dept: PHARMACY | Facility: MEDICAL CENTER | Age: 79
End: 2024-01-26
Payer: COMMERCIAL

## 2024-01-26 DIAGNOSIS — R93.1 ELEVATED CORONARY ARTERY CALCIUM SCORE: ICD-10-CM

## 2024-01-26 DIAGNOSIS — E78.2 MIXED HYPERLIPIDEMIA: ICD-10-CM

## 2024-01-26 RX ORDER — EZETIMIBE 10 MG/1
10 TABLET ORAL DAILY
Qty: 100 TABLET | Refills: 3 | Status: SHIPPED | OUTPATIENT
Start: 2024-01-26 | End: 2024-02-05 | Stop reason: SDUPTHER

## 2024-01-26 NOTE — TELEPHONE ENCOUNTER
Received request via: Patient    Was the patient seen in the last year in this department? Yes    Does the patient have an active prescription (recently filled or refills available) for medication(s) requested? No    Pharmacy Name: [facility]    Does the patient have longterm Plus and need 100 day supply (blood pressure, diabetes and cholesterol meds only)? Yes, quantity updated to 100 days

## 2024-02-05 DIAGNOSIS — R93.1 ELEVATED CORONARY ARTERY CALCIUM SCORE: ICD-10-CM

## 2024-02-05 DIAGNOSIS — E78.2 MIXED HYPERLIPIDEMIA: ICD-10-CM

## 2024-02-05 DIAGNOSIS — I10 BENIGN HYPERTENSION: ICD-10-CM

## 2024-02-05 DIAGNOSIS — E03.9 ACQUIRED HYPOTHYROIDISM: ICD-10-CM

## 2024-02-05 RX ORDER — TRIAMTERENE AND HYDROCHLOROTHIAZIDE 37.5; 25 MG/1; MG/1
1 TABLET ORAL
Qty: 100 TABLET | Refills: 3 | Status: SHIPPED | OUTPATIENT
Start: 2024-02-05

## 2024-02-05 RX ORDER — LOSARTAN POTASSIUM 100 MG/1
100 TABLET ORAL DAILY
Qty: 100 TABLET | Refills: 3 | Status: SHIPPED | OUTPATIENT
Start: 2024-02-05

## 2024-02-05 RX ORDER — EZETIMIBE 10 MG/1
10 TABLET ORAL DAILY
Qty: 100 TABLET | Refills: 3 | Status: CANCELLED | OUTPATIENT
Start: 2024-02-05

## 2024-02-05 RX ORDER — LOVASTATIN 20 MG/1
20 TABLET ORAL NIGHTLY
Qty: 100 TABLET | Refills: 1 | Status: SHIPPED | OUTPATIENT
Start: 2024-02-05

## 2024-02-05 RX ORDER — LEVOTHYROXINE SODIUM 0.05 MG/1
TABLET ORAL
Qty: 124 TABLET | Refills: 3 | Status: SHIPPED | OUTPATIENT
Start: 2024-02-05

## 2024-02-05 RX ORDER — EZETIMIBE 10 MG/1
10 TABLET ORAL DAILY
Qty: 100 TABLET | Refills: 3 | Status: SHIPPED | OUTPATIENT
Start: 2024-02-05

## 2024-02-05 NOTE — TELEPHONE ENCOUNTER
Is the patient due for a refill? Yes    Was the patient seen the past year? Yes    Date of last office visit: 07/05/2023    Does the patient have an upcoming appointment?  Yes   If yes, When? 03/20/2024    Provider to refill:AME    Does the patients insurance require a 100 day supply?  Yes

## 2024-02-07 PROCEDURE — RXMED WILLOW AMBULATORY MEDICATION CHARGE: Performed by: INTERNAL MEDICINE

## 2024-02-08 PROCEDURE — RXMED WILLOW AMBULATORY MEDICATION CHARGE: Performed by: INTERNAL MEDICINE

## 2024-02-09 PROCEDURE — RXMED WILLOW AMBULATORY MEDICATION CHARGE: Performed by: INTERNAL MEDICINE

## 2024-02-12 ENCOUNTER — PHARMACY VISIT (OUTPATIENT)
Dept: PHARMACY | Facility: MEDICAL CENTER | Age: 79
End: 2024-02-12
Payer: COMMERCIAL

## 2024-02-12 PROCEDURE — RXMED WILLOW AMBULATORY MEDICATION CHARGE: Performed by: INTERNAL MEDICINE

## 2024-02-14 ENCOUNTER — PHARMACY VISIT (OUTPATIENT)
Dept: PHARMACY | Facility: MEDICAL CENTER | Age: 79
End: 2024-02-14
Payer: COMMERCIAL

## 2024-03-20 ENCOUNTER — OFFICE VISIT (OUTPATIENT)
Dept: CARDIOLOGY | Facility: MEDICAL CENTER | Age: 79
End: 2024-03-20
Attending: INTERNAL MEDICINE
Payer: MEDICARE

## 2024-03-20 VITALS
BODY MASS INDEX: 23 KG/M2 | RESPIRATION RATE: 14 BRPM | SYSTOLIC BLOOD PRESSURE: 122 MMHG | WEIGHT: 125 LBS | HEART RATE: 78 BPM | DIASTOLIC BLOOD PRESSURE: 82 MMHG | OXYGEN SATURATION: 94 % | HEIGHT: 62 IN

## 2024-03-20 DIAGNOSIS — R93.1 AGATSTON CAC SCORE 200-399: ICD-10-CM

## 2024-03-20 DIAGNOSIS — I10 ESSENTIAL HYPERTENSION: ICD-10-CM

## 2024-03-20 DIAGNOSIS — E78.2 MIXED HYPERLIPIDEMIA: ICD-10-CM

## 2024-03-20 PROCEDURE — 3074F SYST BP LT 130 MM HG: CPT | Performed by: INTERNAL MEDICINE

## 2024-03-20 PROCEDURE — 3079F DIAST BP 80-89 MM HG: CPT | Performed by: INTERNAL MEDICINE

## 2024-03-20 PROCEDURE — 99212 OFFICE O/P EST SF 10 MIN: CPT | Performed by: INTERNAL MEDICINE

## 2024-03-20 PROCEDURE — 99213 OFFICE O/P EST LOW 20 MIN: CPT | Performed by: INTERNAL MEDICINE

## 2024-03-20 PROCEDURE — 99204 OFFICE O/P NEW MOD 45 MIN: CPT | Performed by: INTERNAL MEDICINE

## 2024-03-20 ASSESSMENT — ENCOUNTER SYMPTOMS
WEAKNESS: 0
RESPIRATORY NEGATIVE: 1
WEIGHT LOSS: 0
DEPRESSION: 0
NERVOUS/ANXIOUS: 0
PSYCHIATRIC NEGATIVE: 1
FEVER: 0
BRUISES/BLEEDS EASILY: 0
GASTROINTESTINAL NEGATIVE: 1
NAUSEA: 0
FOCAL WEAKNESS: 0
DOUBLE VISION: 0
DIZZINESS: 0
MUSCULOSKELETAL NEGATIVE: 1
SHORTNESS OF BREATH: 0
NEUROLOGICAL NEGATIVE: 1
VOMITING: 0
PALPITATIONS: 0
ABDOMINAL PAIN: 0
BLURRED VISION: 0
EYES NEGATIVE: 1
COUGH: 0
CONSTITUTIONAL NEGATIVE: 1
CLAUDICATION: 0
HEADACHES: 0
CHILLS: 0
MYALGIAS: 0

## 2024-03-20 ASSESSMENT — FIBROSIS 4 INDEX: FIB4 SCORE: 1.23

## 2024-03-20 NOTE — PROGRESS NOTES
Chief Complaint   Patient presents with    Follow-Up     F/V Dx: Thoracic aorta atherosclerosis (HCC)    Hypertension     F/V Dx: Essential hypertension    Hyperlipidemia     F/V Dx: Mixed hyperlipidemia       Subjective     Neli Hernandez is a 78 y.o. female who presents today for new patient establishment for positive CT coronary calcium study, dyslipidemia with family history of coronary artery disease.    Since the patient's last visit on 07/05/23 with Cristi Huynh who has since left our practice, she has been experiencing chest pain. She describes her chest pain to be mild pressure sensation of her mid chest without radiation, lasting 30 seconds. She denies associated shortness of breath, palpitations, diaphoresis, nausea and vomiting. She denies aggravating or alleviating factors. She used to walk, however, has been inactive after COVID. She admits to consuming too much carbohydrates.     Past Medical History:   Diagnosis Date    Atypical chest pain 01/23/2014    CAD (coronary artery disease)     Cataract     Change in stool habits 12/20/2022    She reports her BM's have slowly changed over recent time. She will have soft stools 3-4 times a day and then over a period of days it will transition into diarrhea and then reset. This cycles about every 7-10 days. No changes in eating habits. Thyroid has been well controlled. No history of Hpylori, SIBO, or Celiac disease. No concerns on colonoscopy in June 2021. No associated weight loss.    Elevated coronary artery calcium score 07/05/2023    Family history of coronary artery disease 01/23/2014    H/O partial thyroidectomy 01/23/2014    Resection due to tumor, found to be benign on pathology. Performed in her 40's. She has remained on thyroid replacement since that time without issue.    Hyperlipidemia     Hypertension     Hypothyroidism, postop     Major depressive disorder, recurrent episode, mild (HCC) 02/23/2023    Depression Screening (2/2023)      Little interest or pleasure in doing things?  1 - several days   Feeling down, depressed , or hopeless? 1 - several days   Trouble falling or staying asleep, or sleeping too much?  2 - more than half the days   Feeling tired or having little energy?  2 - more than half the days   Poor appetite or overeating?  0 - not at all   Feeling bad about yourself - or that y    Menopause 11/27/2019    History of menopause and osteopenia with outdated bone density examination.    Nonspecific abnormal function study, cardiovascular 12/20/2022    Pain of both hip joints 01/20/2017    Pain of left great toe 12/20/2022    She reports left toe pain with associated erythema and swelling last week, no warmth noted. Occurred at base of first metatarsal on the left foot, improved after a few days. No recent uric acid level. Eats a healthy diet with limited animal products.    Prediabetes 07/15/2019      Ref. Range 12/13/2021 11:48  Glycohemoglobin Latest Ref Range: 4.0 - 5.6 % 5.3  Estim. Avg Glu Latest Units: mg/dL 105   A1c 6.0 in mid August 2019. No prior A1c checks, she denies prior knowledge of pre-diabetes. She reports stable weight and has a normal BMI. She has had a decompensation of her triglycerides on recent analysis. No blurred vision, polyuria, or polydipsia. She has brought down h     Past Surgical History:   Procedure Laterality Date    APPENDECTOMY      LAMINOTOMY      cervical fusion; disc bulge?    PRIMARY C SECTION      cervix repair and c section    THYROIDECTOMY      TONSILLECTOMY      US-CYST ASPIRATION-BREAST INITIAL Left 1990's     Family History   Problem Relation Age of Onset    Cancer Maternal Aunt         breast    Stroke Mother         TIA    Heart Disease Father      Social History     Socioeconomic History    Marital status:      Spouse name: Not on file    Number of children: Not on file    Years of education: Not on file    Highest education level: 12th grade   Occupational History    Not on file  "  Tobacco Use    Smoking status: Never    Smokeless tobacco: Never    Tobacco comments:     continued abstinence   Vaping Use    Vaping Use: Never used   Substance and Sexual Activity    Alcohol use: Yes     Alcohol/week: 0.0 - 0.6 oz     Comment: \"maybe twice a year\"    Drug use: No    Sexual activity: Not Currently     Partners: Male     Birth control/protection: Post-Menopausal   Other Topics Concern    Not on file   Social History Narrative    Neli was born and raised in [place]. 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 She has a daughter, Deena Kinney (46, [facility]). Hobbies include reading, she has a tower garden in the yard, she enjoys playing online games.     Social Determinants of Health     Financial Resource Strain: Low Risk  (1/10/2024)    Overall Financial Resource Strain (CARDIA)     Difficulty of Paying Living Expenses: Not hard at all   Food Insecurity: No Food Insecurity (1/10/2024)    Hunger Vital Sign     Worried About Running Out of Food in the Last Year: Never true     Ran Out of Food in the Last Year: Never true   Transportation Needs: No Transportation Needs (1/10/2024)    PRAPARE - Transportation     Lack of Transportation (Medical): No     Lack of Transportation (Non-Medical): No   Physical Activity: Sufficiently Active (12/8/2020)    Exercise Vital Sign     Days of Exercise per Week: 6 days     Minutes of Exercise per Session: 30 min   Stress: No Stress Concern Present (1/10/2024)    [place] [place] [employer] - Occupational Stress Questionnaire     Feeling of Stress : Not at all   Social Connections: Moderately Isolated (1/10/2024)    Social Connection and Isolation Panel [NHANES]     Frequency of Communication with Friends and Family: More than three times a week     Frequency of Social Gatherings with Friends and Family: Not on file     Attends [employer] Services: Never     Active Member of Clubs or " Organizations: No     Attends Club or Organization Meetings: 1 to 4 times per year     Marital Status:    Intimate Partner Violence: Not on file   Housing Stability: Low Risk  (1/10/2024)    Housing Stability Vital Sign     Unable to Pay for Housing in the Last Year: No     Number of Places Lived in the Last Year: 1     Unstable Housing in the Last Year: No     Allergies   Allergen Reactions    Penicillins      (Medications reviewed.)  Outpatient Encounter Medications as of 3/20/2024   Medication Sig Dispense Refill    lovastatin (MEVACOR) 20 MG Tab Take 1 Tablet by mouth every evening. 100 Tablet 1    levothyroxine (SYNTHROID) 50 MCG Tab Take 1 tablet(50mcg) by mouth 5 days per week and 2 tabs(100mcg) on Tues/Thurs 124 Tablet 3    triamterene-hctz (MAXZIDE-25/DYAZIDE) 37.5-25 MG Tab TAKE ONE TABLET BY MOUTH EVERY  Tablet 3    losartan (COZAAR) 100 MG Tab TAKE ONE TABLET BY MOUTH ONE TIME DAILY 100 Tablet 3    ezetimibe (ZETIA) 10 MG Tab Take 1 Tablet by mouth every day. 100 Tablet 3    metoprolol SR (TOPROL XL) 25 MG TABLET SR 24 HR Take 1 Tablet by mouth every day. 100 Tablet 3    Phytosterol Esters (CHOLEST CARE PO) Take 800 mg by mouth 3 times a day. CholestaCare- Propriatery Sterol Blend      coenzyme Q-10 100 MG Cap capsule Take 100 mg by mouth every day.      aspirin EC (ECOTRIN) 81 MG Tablet Delayed Response Take 1 Tablet by mouth every day. 100 Tablet 3    Cholecalciferol (VITAMIN D3) 2000 UNIT Cap Take 1 Capsule by mouth every day. 100 Capsule 3     No facility-administered encounter medications on file as of 3/20/2024.     Review of Systems   Constitutional: Negative.  Negative for chills, fever, malaise/fatigue and weight loss.   HENT: Negative.  Negative for hearing loss.    Eyes: Negative.  Negative for blurred vision and double vision.   Respiratory: Negative.  Negative for cough and shortness of breath.    Cardiovascular:  Positive for chest pain. Negative for palpitations,  "claudication and leg swelling.   Gastrointestinal: Negative.  Negative for abdominal pain, nausea and vomiting.   Genitourinary: Negative.  Negative for dysuria and urgency.   Musculoskeletal: Negative.  Negative for joint pain and myalgias.   Skin: Negative.  Negative for itching and rash.   Neurological: Negative.  Negative for dizziness, focal weakness, weakness and headaches.   Endo/Heme/Allergies: Negative.  Does not bruise/bleed easily.   Psychiatric/Behavioral: Negative.  Negative for depression. The patient is not nervous/anxious.               Objective     /82 (BP Location: Left arm, Patient Position: Sitting, BP Cuff Size: Adult)   Pulse 78   Resp 14   Ht 1.575 m (5' 2\")   Wt 56.7 kg (125 lb)   SpO2 94%   BMI 22.86 kg/m²     Physical Exam  Constitutional:       Appearance: Normal appearance. She is well-developed and normal weight.   HENT:      Head: Normocephalic and atraumatic.   Neck:      Vascular: No JVD.   Cardiovascular:      Rate and Rhythm: Normal rate and regular rhythm.      Heart sounds: Normal heart sounds.   Pulmonary:      Effort: Pulmonary effort is normal.      Breath sounds: Normal breath sounds.   Abdominal:      General: Bowel sounds are normal.      Palpations: Abdomen is soft.      Comments: No hepatosplenomegaly.   Musculoskeletal:         General: Normal range of motion.   Lymphadenopathy:      Cervical: No cervical adenopathy.   Skin:     General: Skin is warm and dry.   Neurological:      Mental Status: She is alert and oriented to person, place, and time.            CARDIAC STUDIES/PROCEDURES:    CT CARDIAC SCORING (02/24/23)  Coronary calcification:  LMA - 0  LCX - 47  LAD - 304  RCA - 1  Total Calcium Score: 352  Percentile: Calcium score is worse than the the 75th percentile for the patient's age and sex.  Other findings:  Heart: Normal size. Normal size ascending aorta, 30 mm. There is moderate descending thoracic aortic atherosclerosis.  IMPRESSION:  Calcium " Score of 100-399 AND >75th percentile:  (study result reviewed)     EKG performed on (07/05/23) was reviewed: EKG personally interpreted shows sinus rhythm.     Laboratory results of (11/29/23) were reviewed. Cholesterol profile of 161/307/32/68 mg/dL noted.    MYOCARDIAL PERFUSION STUDY CONCLUSIONS (11/06/05)  1. NORMAL SPECT-MYOCARDIAL PERFUSION SCAN.   2. LEFT VENTRICULAR EJECTION FRACTION IS 68%.   (study result reviewed)     Assessment & Plan     1. Agatston CAC score 200-399        2. Essential hypertension        3. Mixed hyperlipidemia            Medical Decision Making: Today's Assessment/Status/Plan:        Positive CT coronary calcium study: She is clinically doing well. We will continue with current medical therapy including metoprolol,losartan, lovastatin. We did discuss the options of performing cardiac studies, however, she does not wish to schedule this procedure at this time.   Hypertension: Blood pressure is well controlled. We will continue with hydrochlorothiazide, beta blockade therapy and angiotensin receptor blockade.  Hyperlipidemia: She is doing well on statin therapy without myalgia symptoms. She will reduce her carbohydrate intake. She has labs pending by her primary care physician in 3 months.  Greater than 45 minutes of time was spent to review all above information; of which more than 50% of the time was face to face reviewing thee patient's medical issues, medication evaluation, study result review, lab results review prior records, studies.    We will follow up in 3 months.    CC Sean Mars

## 2024-04-28 PROCEDURE — RXMED WILLOW AMBULATORY MEDICATION CHARGE: Performed by: INTERNAL MEDICINE

## 2024-05-01 ENCOUNTER — PHARMACY VISIT (OUTPATIENT)
Dept: PHARMACY | Facility: MEDICAL CENTER | Age: 79
End: 2024-05-01
Payer: COMMERCIAL

## 2024-05-15 PROCEDURE — RXMED WILLOW AMBULATORY MEDICATION CHARGE: Performed by: INTERNAL MEDICINE

## 2024-05-17 ENCOUNTER — PHARMACY VISIT (OUTPATIENT)
Dept: PHARMACY | Facility: MEDICAL CENTER | Age: 79
End: 2024-05-17
Payer: COMMERCIAL

## 2024-05-22 ENCOUNTER — HOSPITAL ENCOUNTER (OUTPATIENT)
Dept: RADIOLOGY | Facility: MEDICAL CENTER | Age: 79
End: 2024-05-22
Attending: NURSE PRACTITIONER
Payer: MEDICARE

## 2024-05-22 DIAGNOSIS — R19.8 ALTERNATING CONSTIPATION AND DIARRHEA: ICD-10-CM

## 2024-06-06 ENCOUNTER — HOSPITAL ENCOUNTER (OUTPATIENT)
Dept: LAB | Facility: MEDICAL CENTER | Age: 79
End: 2024-06-06
Attending: INTERNAL MEDICINE
Payer: MEDICARE

## 2024-06-06 DIAGNOSIS — E78.2 MIXED HYPERLIPIDEMIA: ICD-10-CM

## 2024-06-06 DIAGNOSIS — E03.9 ACQUIRED HYPOTHYROIDISM: ICD-10-CM

## 2024-06-06 DIAGNOSIS — R73.03 PREDIABETES: ICD-10-CM

## 2024-06-06 LAB
25(OH)D3 SERPL-MCNC: 44 NG/ML (ref 30–100)
BASOPHILS # BLD AUTO: 1.3 % (ref 0–1.8)
BASOPHILS # BLD: 0.1 K/UL (ref 0–0.12)
EOSINOPHIL # BLD AUTO: 0.27 K/UL (ref 0–0.51)
EOSINOPHIL NFR BLD: 3.6 % (ref 0–6.9)
ERYTHROCYTE [DISTWIDTH] IN BLOOD BY AUTOMATED COUNT: 44.3 FL (ref 35.9–50)
EST. AVERAGE GLUCOSE BLD GHB EST-MCNC: 131 MG/DL
HBA1C MFR BLD: 6.2 % (ref 4–5.6)
HCT VFR BLD AUTO: 44.7 % (ref 37–47)
HGB BLD-MCNC: 15.4 G/DL (ref 12–16)
IMM GRANULOCYTES # BLD AUTO: 0.04 K/UL (ref 0–0.11)
IMM GRANULOCYTES NFR BLD AUTO: 0.5 % (ref 0–0.9)
LYMPHOCYTES # BLD AUTO: 2.43 K/UL (ref 1–4.8)
LYMPHOCYTES NFR BLD: 32.7 % (ref 22–41)
MCH RBC QN AUTO: 31.6 PG (ref 27–33)
MCHC RBC AUTO-ENTMCNC: 34.5 G/DL (ref 32.2–35.5)
MCV RBC AUTO: 91.6 FL (ref 81.4–97.8)
MONOCYTES # BLD AUTO: 0.63 K/UL (ref 0–0.85)
MONOCYTES NFR BLD AUTO: 8.5 % (ref 0–13.4)
NEUTROPHILS # BLD AUTO: 3.97 K/UL (ref 1.82–7.42)
NEUTROPHILS NFR BLD: 53.4 % (ref 44–72)
NRBC # BLD AUTO: 0 K/UL
NRBC BLD-RTO: 0 /100 WBC (ref 0–0.2)
PLATELET # BLD AUTO: 293 K/UL (ref 164–446)
PMV BLD AUTO: 11.5 FL (ref 9–12.9)
RBC # BLD AUTO: 4.88 M/UL (ref 4.2–5.4)
T4 FREE SERPL-MCNC: 1.82 NG/DL (ref 0.93–1.7)
TSH SERPL DL<=0.005 MIU/L-ACNC: 1.88 UIU/ML (ref 0.38–5.33)
VIT B12 SERPL-MCNC: 869 PG/ML (ref 211–911)
WBC # BLD AUTO: 7.4 K/UL (ref 4.8–10.8)

## 2024-06-06 PROCEDURE — 84439 ASSAY OF FREE THYROXINE: CPT

## 2024-06-06 PROCEDURE — 85025 COMPLETE CBC W/AUTO DIFF WBC: CPT

## 2024-06-06 PROCEDURE — 82306 VITAMIN D 25 HYDROXY: CPT

## 2024-06-06 PROCEDURE — 82607 VITAMIN B-12: CPT

## 2024-06-06 PROCEDURE — 84443 ASSAY THYROID STIM HORMONE: CPT

## 2024-06-06 PROCEDURE — 83036 HEMOGLOBIN GLYCOSYLATED A1C: CPT

## 2024-06-06 PROCEDURE — 80061 LIPID PANEL: CPT

## 2024-06-06 PROCEDURE — 80053 COMPREHEN METABOLIC PANEL: CPT

## 2024-06-06 PROCEDURE — 36415 COLL VENOUS BLD VENIPUNCTURE: CPT

## 2024-06-07 LAB
ALBUMIN SERPL BCP-MCNC: 4.3 G/DL (ref 3.2–4.9)
ALBUMIN/GLOB SERPL: 1.3 G/DL
ALP SERPL-CCNC: 95 U/L (ref 30–99)
ALT SERPL-CCNC: 55 U/L (ref 2–50)
ANION GAP SERPL CALC-SCNC: 14 MMOL/L (ref 7–16)
AST SERPL-CCNC: 37 U/L (ref 12–45)
BILIRUB SERPL-MCNC: 0.5 MG/DL (ref 0.1–1.5)
BUN SERPL-MCNC: 24 MG/DL (ref 8–22)
CALCIUM ALBUM COR SERPL-MCNC: 10.3 MG/DL (ref 8.5–10.5)
CALCIUM SERPL-MCNC: 10.5 MG/DL (ref 8.5–10.5)
CHLORIDE SERPL-SCNC: 102 MMOL/L (ref 96–112)
CHOLEST SERPL-MCNC: 161 MG/DL (ref 100–199)
CO2 SERPL-SCNC: 23 MMOL/L (ref 20–33)
CREAT SERPL-MCNC: 0.95 MG/DL (ref 0.5–1.4)
FASTING STATUS PATIENT QL REPORTED: NORMAL
GFR SERPLBLD CREATININE-BSD FMLA CKD-EPI: 61 ML/MIN/1.73 M 2
GLOBULIN SER CALC-MCNC: 3.2 G/DL (ref 1.9–3.5)
GLUCOSE SERPL-MCNC: 122 MG/DL (ref 65–99)
HDLC SERPL-MCNC: 32 MG/DL
LDLC SERPL CALC-MCNC: 58 MG/DL
POTASSIUM SERPL-SCNC: 4.2 MMOL/L (ref 3.6–5.5)
PROT SERPL-MCNC: 7.5 G/DL (ref 6–8.2)
SODIUM SERPL-SCNC: 139 MMOL/L (ref 135–145)
TRIGL SERPL-MCNC: 357 MG/DL (ref 0–149)

## 2024-06-10 ENCOUNTER — OFFICE VISIT (OUTPATIENT)
Dept: MEDICAL GROUP | Facility: PHYSICIAN GROUP | Age: 79
End: 2024-06-10
Payer: MEDICARE

## 2024-06-10 VITALS
RESPIRATION RATE: 12 BRPM | WEIGHT: 127.3 LBS | OXYGEN SATURATION: 93 % | TEMPERATURE: 97.4 F | HEIGHT: 62 IN | BODY MASS INDEX: 23.42 KG/M2 | SYSTOLIC BLOOD PRESSURE: 124 MMHG | DIASTOLIC BLOOD PRESSURE: 60 MMHG | HEART RATE: 99 BPM

## 2024-06-10 DIAGNOSIS — R73.03 PREDIABETES: ICD-10-CM

## 2024-06-10 DIAGNOSIS — E78.2 MIXED HYPERLIPIDEMIA: ICD-10-CM

## 2024-06-10 DIAGNOSIS — I10 ESSENTIAL HYPERTENSION: ICD-10-CM

## 2024-06-10 DIAGNOSIS — M41.26 OTHER IDIOPATHIC SCOLIOSIS, LUMBAR REGION: ICD-10-CM

## 2024-06-10 DIAGNOSIS — M85.89 OSTEOPENIA OF MULTIPLE SITES: ICD-10-CM

## 2024-06-10 DIAGNOSIS — E03.9 ACQUIRED HYPOTHYROIDISM: ICD-10-CM

## 2024-06-10 PROCEDURE — 99214 OFFICE O/P EST MOD 30 MIN: CPT | Performed by: INTERNAL MEDICINE

## 2024-06-10 PROCEDURE — 3078F DIAST BP <80 MM HG: CPT | Performed by: INTERNAL MEDICINE

## 2024-06-10 PROCEDURE — 3074F SYST BP LT 130 MM HG: CPT | Performed by: INTERNAL MEDICINE

## 2024-06-10 ASSESSMENT — FIBROSIS 4 INDEX: FIB4 SCORE: 1.31

## 2024-06-10 NOTE — ASSESSMENT & PLAN NOTE
Chronic stable problem, blood pressure is at goal on current regimen, continue losartan 100 mg daily, triamterene-hydrochlorothiazide 37.5-25 mg daily, and metoprolol succinate 25 mg daily.  She will follow-up with cardiology next month.

## 2024-06-10 NOTE — ASSESSMENT & PLAN NOTE
Chronic ongoing issue, LDL is at goal but HDL still low and triglycerides have further increased.  Will follow-up with Dr. Velasco of cardiology next month.  She is open to medication adjustments as recommended.

## 2024-06-10 NOTE — PROGRESS NOTES
Subjective:   Chief Complaint/History of Present Illness:  Neli Hernandez is a 78 y.o. female established patient who presents today to discuss medical problems as listed below. Neli is unaccompanied for today's visit.    Problem   Other Idiopathic Scoliosis, Lumbar Region    New finding of mild degenerative scoliosis on abdominal xray imaging. History of prior cervical spine fusion. No current pain. Has a back brace to wear if needed. Interested in PT evaluation to help with good back practices and maintaining her functionality.      Prediabetes     Latest Reference Range & Units 06/06/24 10:09   Glycohemoglobin 4.0 - 5.6 % 6.2 (H)   Estim. Avg Glu mg/dL 131     A1c 6.0 in mid August 2019. No prior A1c checks, she denies prior knowledge of pre-diabetes. She reports stable weight and has a normal BMI. She has had a decompensation of her triglycerides on recent analysis. No blurred vision, polyuria, or polydipsia. She has brought down her A1c through dietary changes.     Essential Hypertension    She has a history of hypertension since her late 50's.    Her blood pressure tends to run higher when she is at the doctor due to white coat hypertension. Her heart rate at home tends to run in the 60's and she has a wrist BP cuff but does not usually check it.      She had decompensation of her blood pressure in early summer 2021.  Her blood pressure will run as high as the 170s systolic but often not below the 140s systolic.  She also has some associated palpitations with heart rate up to the low 100s. Additional pharmacotherapy was added with good result.      BP in clinic ranging 110-124/60-70    Current regimen: losartan 100 mg daily, triamterene-hctz 37.5-25 mg daily and metoprolol succinate 25 mg daily     Osteopenia of Multiple Sites    Bone Density (6/2022):  lumbar spine T score of -1.1 (-1.7 in 2020)   proximal left femur T score of -1.7 (-1.6 in 2020)    When compared with the most recent study dated  2/11/2020, there has been a 7.9% increase in the bone mineral density of the lumbar spine and a 2.1% decrease in the bone mineral density of the proximal left femur.     10-year Probability of Fracture:  Major Osteoporotic     17.2%  Hip     3.9%    Osteopenia noted back in 2012, she is on calcium and vitamin D since that time. Prior history of wrist fracture.     Acquired Hypothyroidism     Latest Reference Range & Units 06/06/24 10:09   TSH 0.380 - 5.330 uIU/mL 1.880   Free T-4 0.93 - 1.70 ng/dL 1.82 (H)     Maintained on levothyroxine 50 mcg for many years, well controlled. She previously followed with endocrinology, Dr. Win. She denies any fluctuations or changes to her dose for quite some time. She denies any signs or symptoms of over treatment or under treatment at this time.    Current regimen: levothyroxine 50 mcg 5 days per week and 100 mcg on Tues/Thurs     Mixed Hyperlipidemia     Latest Reference Range & Units 06/06/24 10:09   Cholesterol,Tot 100 - 199 mg/dL 161   Triglycerides 0 - 149 mg/dL 357 (H)   HDL >=40 mg/dL 32 !   LDL <100 mg/dL 58     The 10-year ASCVD risk score (Shayan SOLIS, et al., 2019) is: 23.1%    CT cardiac score (2023):  Coronary calcification:  LMA - 0  LCX - 47  LAD - 304  RCA - 1     Total Calcium Score: 352    She reports using lovastatin since her 60's. It was started due to elevated cholesterol values, she denies any history of cardiovascular or cerebrovascular disease.     She reports that her triglycerides have never been as high as they are now. No history of pancreatitis. She has never been on fenofibrate medications.  Elevated CT cardiac score so she is now on statin therapy.    Current regimen: Lovastatin 20 mg nightly plus ezetimibe 10 mg daily plus fish oil 4 g daily  Previous regimen: Lovastatin 40 mg daily          Current Medications:  Current Outpatient Medications Ordered in Epic   Medication Sig Dispense Refill    lovastatin (MEVACOR) 20 MG Tab Take 1 Tablet by  "mouth every evening. 100 Tablet 1    levothyroxine (SYNTHROID) 50 MCG Tab Take 1 tablet(50mcg) by mouth 5 days per week and 2 tabs(100mcg) on Tues/Thurs 124 Tablet 3    triamterene-hctz (MAXZIDE-25/DYAZIDE) 37.5-25 MG Tab TAKE ONE TABLET BY MOUTH EVERY  Tablet 3    losartan (COZAAR) 100 MG Tab TAKE ONE TABLET BY MOUTH ONE TIME DAILY 100 Tablet 3    ezetimibe (ZETIA) 10 MG Tab Take 1 Tablet by mouth every day. 100 Tablet 3    metoprolol SR (TOPROL XL) 25 MG TABLET SR 24 HR Take 1 Tablet by mouth every day. 100 Tablet 3    Phytosterol Esters (CHOLEST CARE PO) Take 800 mg by mouth 3 times a day. CholestaCare- Propriatery Sterol Blend      coenzyme Q-10 100 MG Cap capsule Take 100 mg by mouth every day.      aspirin EC (ECOTRIN) 81 MG Tablet Delayed Response Take 1 Tablet by mouth every day. 100 Tablet 3    Cholecalciferol (VITAMIN D3) 2000 UNIT Cap Take 1 Capsule by mouth every day. 100 Capsule 3     No current [facility]-ordered facility-administered medications on file.          Objective:   Physical Exam:    Vitals: /60 (BP Location: Right arm, Patient Position: Sitting, BP Cuff Size: Adult)   Pulse 99   Temp 36.3 °C (97.4 °F) (Temporal)   Resp 12   Ht 1.562 m (5' 1.5\")   Wt 57.7 kg (127 lb 4.8 oz)   SpO2 93%    BMI: Body mass index is 23.66 kg/m².  Physical Exam  Constitutional:       General: She is not in acute distress.     Appearance: Normal appearance. She is not ill-appearing.   HENT:      Right Ear: Ear canal and external ear normal. There is no impacted cerumen.      Left Ear: Ear canal and external ear normal. There is no impacted cerumen.   Eyes:      General: No scleral icterus.     Conjunctiva/sclera: Conjunctivae normal.   Neck:      Vascular: No carotid bruit.   Cardiovascular:      Rate and Rhythm: Normal rate and regular rhythm.      Pulses: Normal pulses.   Pulmonary:      Effort: Pulmonary effort is normal. No respiratory distress.      Breath sounds: No wheezing or rhonchi. "   Abdominal:      Palpations: Abdomen is soft.      Tenderness: There is no abdominal tenderness.   Musculoskeletal:      Right lower leg: No edema.      Left lower leg: No edema.   Lymphadenopathy:      Cervical: No cervical adenopathy.   Skin:     General: Skin is warm and dry.      Findings: No rash.   Psychiatric:         Mood and Affect: Mood normal.         Behavior: Behavior normal.         Thought Content: Thought content normal.         Judgment: Judgment normal.               Assessment and Plan:   Neli is a 78 y.o. female with the following:  Problem List Items Addressed This Visit       Acquired hypothyroidism     Chronic and ongoing issue, at goal, mild elevation of free T4 but no overt symptoms of overtreatment at this time.  If ongoing with follow-up lab work to include consider dose adjustment with decreasing levothyroxine to 50 mcg 7 days per.  Will continue with levothyroxine 50 mcg 5 days/week and 100 mcg 2 days/week.         Essential hypertension     Chronic stable problem, blood pressure is at goal on current regimen, continue losartan 100 mg daily, triamterene-hydrochlorothiazide 37.5-25 mg daily, and metoprolol succinate 25 mg daily.  She will follow-up with cardiology next month.         Mixed hyperlipidemia     Chronic ongoing issue, LDL is at goal but HDL still low and triglycerides have further increased.  Will follow-up with Dr. Velasco of cardiology next month.  She is open to medication adjustments as recommended.         Osteopenia of multiple sites     Chronic ongoing issue, due for bone density, order provided today.  Continue calcium and vitamin D at this time.         Relevant Orders    DS-BONE DENSITY STUDY (DEXA)    Other idiopathic scoliosis, lumbar region     New problem identified incidentally on abdominal xray imaging, will refer to PT for advice on good back practices and recommended exercises for range of motion and flexibility. Could consider physiatry in the future or  orthopedics depending symptom evolution.          Relevant Orders    Referral to Physical Therapy    Prediabetes     Chronic ongoing problem, A1c continues to run in the low 6 range, she tries to be consistent with healthy eating.               RTC: Return in about 6 months (around 12/10/2024).    I spent a total of 32 minutes with record review, exam, communication with the patient, communication with other providers, and documentation of this encounter.    PLEASE NOTE: This dictation was created using voice recognition software. I have made every reasonable attempt to correct obvious errors, but I expect that there are errors of grammar and possibly content that I did not discover before finalizing the note.      Fariha Gold, DO  Geriatric and Internal Medicine  [facility]      3 = A little assistance

## 2024-06-10 NOTE — ASSESSMENT & PLAN NOTE
Chronic ongoing issue, due for bone density, order provided today.  Continue calcium and vitamin D at this time.

## 2024-06-10 NOTE — ASSESSMENT & PLAN NOTE
New problem identified incidentally on abdominal xray imaging, will refer to PT for advice on good back practices and recommended exercises for range of motion and flexibility. Could consider physiatry in the future or orthopedics depending symptom evolution.    INTERVAL HPI/OVERNIGHT EVENTS: I feel okay .   Vital Signs Last 24 Hrs  T(C): 36.6 (13 Nov 2019 13:26), Max: 37.1 (13 Nov 2019 05:23)  T(F): 97.9 (13 Nov 2019 13:26), Max: 98.7 (13 Nov 2019 05:23)  HR: 68 (13 Nov 2019 13:26) (61 - 100)  BP: 133/85 (13 Nov 2019 13:26) (130/98 - 164/98)  BP(mean): 124 (12 Nov 2019 20:30) (107 - 124)  RR: 18 (13 Nov 2019 13:26) (17 - 24)  SpO2: 96% (13 Nov 2019 13:26) (93% - 97%)  I&O's Summary    MEDICATIONS  (STANDING):  apixaban 10 milliGRAM(s) Oral every 12 hours  calcium carbonate 1250 mG  + Vitamin D (OsCal 500 + D) 1 Tablet(s) Oral daily  metoprolol tartrate 25 milliGRAM(s) Oral two times a day  multivitamin 1 Tablet(s) Oral daily  simvastatin 20 milliGRAM(s) Oral at bedtime  sodium chloride 0.9%. 1000 milliLiter(s) (75 mL/Hr) IV Continuous <Continuous>    MEDICATIONS  (PRN):  acetaminophen   Tablet .. 650 milliGRAM(s) Oral every 6 hours PRN Mild Pain (1 - 3)  melatonin 3 milliGRAM(s) Oral at bedtime PRN Insomnia  traMADol 25 milliGRAM(s) Oral every 6 hours PRN moderate to severe pain (4-10)    LABS:                        12.0   5.25  )-----------( 210      ( 13 Nov 2019 05:20 )             37.6     11-13    145  |  108<H>  |  32<H>  ----------------------------<  88  4.1   |  23  |  0.57    Ca    9.3      13 Nov 2019 05:20  Phos  3.9     11-13  Mg     2.2     11-13    TPro  5.7<L>  /  Alb  3.3  /  TBili  1.2  /  DBili  x   /  AST  22  /  ALT  14  /  AlkPhos  262<H>  11-13    PTT - ( 13 Nov 2019 05:20 )  PTT:29.3 SEC    CAPILLARY BLOOD GLUCOSE              REVIEW OF SYSTEMS:  CONSTITUTIONAL: No fever, weight loss, or fatigue  EYES: No eye pain, visual disturbances, or discharge  ENMT:  No difficulty hearing, tinnitus, vertigo; No sinus or throat pain  NECK: No pain or stiffness  RESPIRATORY: No cough, wheezing, chills or hemoptysis; No shortness of breath  CARDIOVASCULAR: No chest pain, palpitations, dizziness, or leg swelling  GASTROINTESTINAL: No abdominal or epigastric pain. No nausea, vomiting, or hematemesis; No diarrhea or constipation. No melena or hematochezia.  GENITOURINARY: No dysuria, frequency, hematuria, or incontinence  NEUROLOGICAL: No headaches, memory loss, loss of strength, numbness, or tremors    Consultant(s) Notes Reviewed:  [x ] YES  [ ] NO    PHYSICAL EXAM:  GENERAL: NAD, well-groomed, well-developed,not in any distress ,  HEAD:  Atraumatic, Normocephalic  EYES: EOMI, PERRLA, conjunctiva and sclera clear  ENMT: No tonsillar erythema, exudates, or enlargement; Moist mucous membranes, Good dentition, No lesions  NECK: Supple, No JVD, Normal thyroid  NERVOUS SYSTEM:  Alert & Oriented X3, No focal deficit   CHEST/LUNG: Good air entry bilateral with no  rales, rhonchi, wheezing, or rubs  HEART: Regular rate and rhythm; No murmurs, rubs, or gallops  ABDOMEN: Soft, Nontender, Nondistended; Bowel sounds present  EXTREMITIES:  2+ Peripheral Pulses, No clubbing, cyanosis, or edema  SKIN: No rashes or lesions    Care Discussed with Consultants/Other Providers [ x] YES  [ ] NO

## 2024-06-10 NOTE — ASSESSMENT & PLAN NOTE
Chronic and ongoing issue, at goal, mild elevation of free T4 but no overt symptoms of overtreatment at this time.  If ongoing with follow-up lab work to include consider dose adjustment with decreasing levothyroxine to 50 mcg 7 days per.  Will continue with levothyroxine 50 mcg 5 days/week and 100 mcg 2 days/week.

## 2024-06-10 NOTE — ASSESSMENT & PLAN NOTE
Chronic ongoing problem, A1c continues to run in the low 6 range, she tries to be consistent with healthy eating.

## 2024-06-13 ENCOUNTER — APPOINTMENT (OUTPATIENT)
Dept: RADIOLOGY | Facility: MEDICAL CENTER | Age: 79
End: 2024-06-13
Attending: INTERNAL MEDICINE
Payer: MEDICARE

## 2024-07-01 ENCOUNTER — APPOINTMENT (OUTPATIENT)
Dept: RADIOLOGY | Facility: MEDICAL CENTER | Age: 79
End: 2024-07-01
Attending: INTERNAL MEDICINE
Payer: MEDICARE

## 2024-07-01 DIAGNOSIS — M85.89 OSTEOPENIA OF MULTIPLE SITES: ICD-10-CM

## 2024-07-01 PROCEDURE — 77080 DXA BONE DENSITY AXIAL: CPT

## 2024-07-02 PROCEDURE — RXMED WILLOW AMBULATORY MEDICATION CHARGE: Performed by: INTERNAL MEDICINE

## 2024-07-05 ENCOUNTER — PHARMACY VISIT (OUTPATIENT)
Dept: PHARMACY | Facility: MEDICAL CENTER | Age: 79
End: 2024-07-05
Payer: COMMERCIAL

## 2024-07-23 ENCOUNTER — APPOINTMENT (OUTPATIENT)
Dept: CARDIOLOGY | Facility: MEDICAL CENTER | Age: 79
End: 2024-07-23
Attending: INTERNAL MEDICINE
Payer: MEDICARE

## 2024-08-01 DIAGNOSIS — E78.2 MIXED HYPERLIPIDEMIA: ICD-10-CM

## 2024-08-01 DIAGNOSIS — R93.1 ELEVATED CORONARY ARTERY CALCIUM SCORE: ICD-10-CM

## 2024-08-01 PROCEDURE — RXMED WILLOW AMBULATORY MEDICATION CHARGE: Performed by: INTERNAL MEDICINE

## 2024-08-01 RX ORDER — LOVASTATIN 20 MG
20 TABLET ORAL NIGHTLY
Qty: 100 TABLET | Refills: 1 | Status: SHIPPED | OUTPATIENT
Start: 2024-08-01

## 2024-08-01 NOTE — TELEPHONE ENCOUNTER
Pt has had OV within the 12 month protocol and lipid panel is current. 6 month supply sent to pharmacy.   Lab Results   Component Value Date/Time    CHOLSTRLTOT 161 06/06/2024 10:09 AM    LDL 58 06/06/2024 10:09 AM    HDL 32 (A) 06/06/2024 10:09 AM    TRIGLYCERIDE 357 (H) 06/06/2024 10:09 AM       Lab Results   Component Value Date/Time    SODIUM 139 06/06/2024 10:09 AM    POTASSIUM 4.2 06/06/2024 10:09 AM    CHLORIDE 102 06/06/2024 10:09 AM    CO2 23 06/06/2024 10:09 AM    GLUCOSE 122 (H) 06/06/2024 10:09 AM    BUN 24 (H) 06/06/2024 10:09 AM    CREATININE 0.95 06/06/2024 10:09 AM     Lab Results   Component Value Date/Time    ALKPHOSPHAT 95 06/06/2024 10:09 AM    ASTSGOT 37 06/06/2024 10:09 AM    ALTSGPT 55 (H) 06/06/2024 10:09 AM    TBILIRUBIN 0.5 06/06/2024 10:09 AM

## 2024-08-02 ENCOUNTER — PHARMACY VISIT (OUTPATIENT)
Dept: PHARMACY | Facility: MEDICAL CENTER | Age: 79
End: 2024-08-02
Payer: COMMERCIAL

## 2024-08-06 ENCOUNTER — PHARMACY VISIT (OUTPATIENT)
Dept: PHARMACY | Facility: MEDICAL CENTER | Age: 79
End: 2024-08-06
Payer: COMMERCIAL

## 2024-08-06 PROCEDURE — RXMED WILLOW AMBULATORY MEDICATION CHARGE: Performed by: INTERNAL MEDICINE

## 2024-08-14 ENCOUNTER — PHYSICAL THERAPY (OUTPATIENT)
Dept: PHYSICAL THERAPY | Facility: REHABILITATION | Age: 79
End: 2024-08-14
Attending: INTERNAL MEDICINE
Payer: MEDICARE

## 2024-08-14 DIAGNOSIS — M41.26 OTHER IDIOPATHIC SCOLIOSIS, LUMBAR REGION: ICD-10-CM

## 2024-08-14 PROCEDURE — 97110 THERAPEUTIC EXERCISES: CPT

## 2024-08-14 PROCEDURE — 97161 PT EVAL LOW COMPLEX 20 MIN: CPT

## 2024-08-14 SDOH — ECONOMIC STABILITY: GENERAL: QUALITY OF LIFE: GOOD

## 2024-08-14 ASSESSMENT — ENCOUNTER SYMPTOMS
PAIN SCALE: 0
QUALITY: ACHING
ALLEVIATING FACTORS: REST
PAIN SCALE AT LOWEST: 0
EXACERBATED BY: ACTIVITY
EXACERBATED BY: PROLONGED STANDING
PAIN LOCATION: MID THORACIC SPINE
PAIN SCALE AT HIGHEST: 3
EXACERBATED BY: BENDING
PAIN TIMING: OCCASIONAL
QUALITY: DULL ACHE

## 2024-08-14 NOTE — OP THERAPY EVALUATION
Outpatient Physical Therapy  INITIAL EVALUATION    [facility] [place]  [address]  [place] [address]  Phone:  [phone]  Fax:  [phone]    Date of Evaluation: 2024    Patient: Neli Hernandez  YOB: 1945  MRN: [id number]     Referring Provider: OSVALDO Mckeon [address] [place] [address]   Referring Diagnosis Other idiopathic scoliosis, lumbar region [M41.26]     Time Calculation  Start time: 1415  Stop time: 1500 Time Calculation (min): 45 minutes         Chief Complaint: Back Problem    Visit Diagnoses     ICD-10-CM   1. Other idiopathic scoliosis, lumbar region  M41.26       Date of onset of impairment: 2024    Subjective:   History of Present Illness:     Date of onset:  2024    Mechanism of injury:  She had a scan to check for impaction and it was revealed that she has scoliosis. She had a recent bone density scan and it was revealed again; she spoke to her physician about it and they agreed to try physical therapy since she has been having some back pain. She notices back pain with bending, lifting heavy objects, and occasionally with prolonged standing. She has seen a chiropractor in the past and was adjusted in her spine and then felt numbness at her knees and odd feelings; she had an out of body experience and could look down on her body. As this was happening she was transported to the hospital. She recovered and didn't quite know what happened but has not had a problem since.   Quality of life:  Good  Prior level of function:  Independent. likes to walk outside, she also uses a vibration plate for exercise and bone density.  Pain:     Current pain ratin    At best pain ratin    At worst pain rating:  3    Location:  Mid thoracic spine    Quality:  Aching and dull ache    Pain timing:  Occasional    Relieving factors:  Rest    Aggravating factors:  Bending, activity and prolonged standing  (carrying objects, lifting heavy objects)  Patient Goals:     Patient goals for therapy:  Decreased pain    Other patient goals:  She wants to be as healthy as possible and prevent any future issues due to scoliosis      Past Medical History:   Diagnosis Date    Atypical chest pain 01/23/2014    CAD (coronary artery disease)     Cataract     Change in stool habits 12/20/2022    She reports her BM's have slowly changed over recent time. She will have soft stools 3-4 times a day and then over a period of days it will transition into diarrhea and then reset. This cycles about every 7-10 days. No changes in eating habits. Thyroid has been well controlled. No history of Hpylori, SIBO, or Celiac disease. No concerns on colonoscopy in June 2021. No associated weight loss.    Elevated coronary artery calcium score 07/05/2023    Family history of coronary artery disease 01/23/2014    H/O partial thyroidectomy 01/23/2014    Resection due to tumor, found to be benign on pathology. Performed in her 40's. She has remained on thyroid replacement since that time without issue.    Hyperlipidemia     Hypertension     Hypothyroidism, postop     Major depressive disorder, recurrent episode, mild (HCC) 02/23/2023    Depression Screening (2/2023)     Little interest or pleasure in doing things?  1 - several days   Feeling down, depressed , or hopeless? 1 - several days   Trouble falling or staying asleep, or sleeping too much?  2 - more than half the days   Feeling tired or having little energy?  2 - more than half the days   Poor appetite or overeating?  0 - not at all   Feeling bad about yourself - or that y    Menopause 11/27/2019    History of menopause and osteopenia with outdated bone density examination.    Nonspecific abnormal function study, cardiovascular 12/20/2022    Pain of both hip joints 01/20/2017    Pain of left great toe 12/20/2022    She reports left toe pain with associated erythema and swelling last week, no warmth  "noted. Occurred at base of first metatarsal on the left foot, improved after a few days. No recent uric acid level. Eats a healthy diet with limited animal products.    Prediabetes 07/15/2019      Ref. Range 12/13/2021 11:48  Glycohemoglobin Latest Ref Range: 4.0 - 5.6 % 5.3  Estim. Avg Glu Latest Units: mg/dL 105   A1c 6.0 in mid August 2019. No prior A1c checks, she denies prior knowledge of pre-diabetes. She reports stable weight and has a normal BMI. She has had a decompensation of her triglycerides on recent analysis. No blurred vision, polyuria, or polydipsia. She has brought down h     Past Surgical History:   Procedure Laterality Date    APPENDECTOMY      LAMINOTOMY      cervical fusion; disc bulge?    PRIMARY C SECTION      cervix repair and c section    THYROIDECTOMY      TONSILLECTOMY      US-CYST ASPIRATION-BREAST INITIAL Left 1990's     Social History     Tobacco Use    Smoking status: Never    Smokeless tobacco: Never    Tobacco comments:     continued abstinence   Substance Use Topics    Alcohol use: Yes     Alcohol/week: 0.0 - 0.6 oz     Comment: \"maybe twice a year\"     Family and Occupational History     Socioeconomic History    Marital status:      Spouse name: Not on file    Number of children: Not on file    Years of education: Not on file    Highest education level: 12th grade   Occupational History    Not on file       Objective     Observations   Central spine     Positive for thoracic kyphosis.    Postural Observations  Seated posture: fair  Standing posture: fair      Hip Screen   Hip range of motion within functional limits.  Hip strength within functional limits    Neurological Testing     Myotome testing   Lumbar (left)   All left lumbar myotomes within normal limits    Lumbar (right)   All right lumbar myotomes within normal limits    Dermatome testing   Lumbar (left)   All left lumbar dermatomes intact    Lumbar (right)   All right lumbar dermatomes intact    Palpation   Left " "  Tenderness of the lumbar paraspinals.     Right   Tenderness of the lumbar paraspinals.     Additional Palpation Details  Thoracic paraspinals slightly tender, trigger point at L thoracic paraspinals     Active Range of Motion     Lumbar   Flexion: decreased  Extension: decreased  Left lateral flexion: within functional limits  Right lateral flexion: within functional limits  Left rotation: within functional limits  Right rotation: within functional limits    Additional Active Range of Motion Details  Thoracic extension decreased by 25%    Joint Play   Spine     Central PA Sargeant        T10: hypomobile       T11: hypomobile      Additional hip joint play details: Central PA's at T8-T12 hypomobile, slight c/o discomfort as per Pt.        Strength:      Abdominals   Left: 4  Right: 4    Back   Flexion: 4  Extension: 4  Lateral bend left: 4  Lateral bend right: 4  Rotation left: 4  Rotation right: 4    Lower extremities   Normal left lower extremity strength  Normal right lower extremity strength    Additional Strength Details  B UE strength WFL    Tests       Lumbar spine (left)      Negative slump.   Lumbar spine (right)     Negative slump.     Left Hip   SLR: Negative.     Right Hip   SLR: Negative.     Lennie LumbarTest     Standing repeated motions:   Repeat flexion in standing     Symptoms during testing: increases (with repeated flexion she has pain in thoracic spine in the area of T8-T12)    General Comments     Spine Comments   Scoliotic curve: R lumbar slight L thoracic curve, very mild, slight gibbus noted at L ribs        Therapeutic Exercises (CPT 51101):     1. scapular retraction, 15x 5 sec holds    2. wall angels, 10x    3. standing postural reset, hands resting gently on head, VC for elongating spine pushing into hands, standing \"taller\"      Therapeutic Exercise Summary: Pt. Given HEP handout with above exercises      Time-based treatments/modalities:    Physical Therapy Timed Treatment " Charges  Therapeutic exercise minutes (CPT 55090): 10 minutes      Assessment, Response and Plan:   Impairments: activity intolerance    Assessment details:  Pt. Is a 78 y.o.f. who presents to physical therapy with c/o lumbar and thoracic spine pain and discomfort. She has recently been diagnosed with mild scoliosis. Scoliotic curve appears to be mild R lumbar curve with slight thoracic curve to L. She has c/o pain and discomfort at T8-T12. She also exhibits forward head posture with excessive thoracic kyphosis. She is limited by pain and discomfort in the thoracic and sometimes lumbar spine with bending, prolonged standing, carrying and lifting objects, and performing ADLs throughout the day. She would benefit from physical therapy in order to initiate a postural strengthening and stability program in order to improve functional mobility and activity tolerance and slow the progression of her scoliotic curves.   Barriers to therapy:  None  Prognosis: good    Goals:   Short Term Goals:   -Pt. will be able to demonstrate improved postural awareness without verbal cueing  -Pt. Will be able to perform bending when cooking and cleaning her kitchen with pain rated 1/10 or less in her spine    Short term goal time span:  1-2 weeks      Long Term Goals:    -Pt. Will be able to  a 15lb object from floor to waist level with pain rated 1/10 or less in spine to facilitate ADLs around the house and yard  -Pt. Will be able to carry a 10lb object for 50 ft without pain  -Pt. Will be able to demonstrate and perform her HEP independently   Long term goal time span:  4-6 weeks    Plan:   Therapy options:  Physical therapy treatment to continue  Planned therapy interventions:  Hot or Cold Pack Therapy (CPT 31000), Manual Therapy (CPT 96991), Mechanical Traction (CPT 23512), Neuromuscular Re-education (CPT 63863), Therapeutic Exercise (CPT 95876), Therapeutic Activities (CPT 77536) and Self Care ADL Training (CPT  94573)  Frequency:  2x week  Duration in weeks:  6  Discussed with:  Patient      Functional Assessment Used  Oswestry Disability Assessment Total Score: 0     Referring provider co-signature:  I have reviewed this plan of care and my co-signature certifies the need for services.    Certification Period: 08/14/2024 to  10/02/24    Physician Signature: ________________________________ Date: ______________

## 2024-08-20 ENCOUNTER — PHYSICAL THERAPY (OUTPATIENT)
Dept: PHYSICAL THERAPY | Facility: REHABILITATION | Age: 79
End: 2024-08-20
Attending: INTERNAL MEDICINE
Payer: MEDICARE

## 2024-08-20 DIAGNOSIS — M41.26 OTHER IDIOPATHIC SCOLIOSIS, LUMBAR REGION: ICD-10-CM

## 2024-08-20 PROCEDURE — 97110 THERAPEUTIC EXERCISES: CPT

## 2024-08-21 NOTE — OP THERAPY DAILY TREATMENT
Outpatient Physical Therapy  DAILY TREATMENT     [facility] [place]  [address], [address]  [place] [address]  Phone:  [phone]  Fax:  [phone]    Date: 08/20/2024    Patient: Neli Hernandez  YOB: 1945  MRN: [id number]     Time Calculation    Start time: 1500  Stop time: 1545 Time Calculation (min): 45 minutes         Chief Complaint: Back Problem    Visit #: 2    SUBJECTIVE:  Patient reports no real change in symptoms.     OBJECTIVE:  Current objective measures:           Therapeutic Exercises (CPT 11270):     1. Nu step, x5min    2. UBE, x4min    3. Ex program as below      Therapeutic Exercise Summary: Access Code: [email or web address]  URL: [email or web address]  Date: 08/21/2024  Prepared by: Mikal Lewisova    Exercises  - Seated Scapular Retraction  - 1 x daily - 7 x weekly - 2 sets - 10 reps  - Wall [place]  - 1 x daily - 7 x weekly - 2 sets - 10 reps  - Tra  - 1 x daily - 7 x weekly - 2 sets - 10 reps  - Seated Multifidi Isometric  - 1 x daily - 7 x weekly - 2 sets - 10 reps  - Shoulder Extension with Resistance  - 1-3 x daily - 7 x weekly - 2 sets - 10 reps  - Standing Shoulder Row  - 1-3 x daily - 7 x weekly - 2 sets - 10 reps      Time-based treatments/modalities:    Physical Therapy Timed Treatment Charges  Therapeutic exercise minutes (CPT 67143): 40 minutes      Pain rating (1-10) before treatment:  2  Pain rating (1-10) after treatment:  2    ASSESSMENT:   Response to treatment: Patient responded fair to therapy with cues needed for form. Patient to continue with postural strength training.     PLAN/RECOMMENDATIONS:   Plan for treatment: therapy treatment to continue next visit.  Planned interventions for next visit: continue with current treatment.

## 2024-08-22 ENCOUNTER — PHYSICAL THERAPY (OUTPATIENT)
Dept: PHYSICAL THERAPY | Facility: REHABILITATION | Age: 79
End: 2024-08-22
Attending: INTERNAL MEDICINE
Payer: MEDICARE

## 2024-08-22 DIAGNOSIS — M41.26 OTHER IDIOPATHIC SCOLIOSIS, LUMBAR REGION: ICD-10-CM

## 2024-08-22 PROCEDURE — 97110 THERAPEUTIC EXERCISES: CPT

## 2024-08-23 NOTE — OP THERAPY DAILY TREATMENT
Outpatient Physical Therapy  DAILY TREATMENT     [facility] [place]  [address]  [facility] [address]  Phone:  [phone]  Fax:  [phone]    Date: 08/22/2024    Patient: Neli Hernandez  YOB: 1945  MRN: [id number]     Time Calculation    Start time: 1500  Stop time: 1545 Time Calculation (min): 45 minutes         Chief Complaint: Back Problem    Visit #: 3    SUBJECTIVE:  Patient reports that she feels a little better. Patient states her exercises were not an issue.     OBJECTIVE:  Current objective measures:           Therapeutic Exercises (CPT 89644):     1. Nu step, x5min    2. UBE, x4min    3. Ex program as below    4. add anibal white      Therapeutic Exercise Summary: Access Code: [email or web address]  URL: [email or web address]  Date: 08/21/2024  Prepared by: Mikal Aguirre    Exercises  - Seated Scapular Retraction  - 1 x daily - 7 x weekly - 2 sets - 10 reps  - Wall [place]  - 1 x daily - 7 x weekly - 2 sets - 10 reps  - Tra  - 1 x daily - 7 x weekly - 2 sets - 10 reps  - Seated Multifidi Isometric  - 1 x daily - 7 x weekly - 2 sets - 10 reps  - Shoulder Extension with Resistance  - 1-3 x daily - 7 x weekly - 2 sets - 10 reps  - Standing Shoulder Row  - 1-3 x daily - 7 x weekly - 2 sets - 10 reps      Time-based treatments/modalities:    Physical Therapy Timed Treatment Charges  Therapeutic exercise minutes (CPT 63150): 40 minutes      Pain rating (1-10) before treatment:  1  Pain rating (1-10) after treatment:  1    ASSESSMENT:   Response to treatment: Patient responded well to therapy with no increase in symptoms. Patient to continue current program and advance slowly with core control activity.     PLAN/RECOMMENDATIONS:   Plan for treatment: therapy treatment to continue next visit.  Planned interventions for next visit: continue with current treatment.

## 2024-08-27 ENCOUNTER — PHYSICAL THERAPY (OUTPATIENT)
Dept: PHYSICAL THERAPY | Facility: REHABILITATION | Age: 79
End: 2024-08-27
Attending: INTERNAL MEDICINE
Payer: MEDICARE

## 2024-08-27 DIAGNOSIS — M41.26 OTHER IDIOPATHIC SCOLIOSIS, LUMBAR REGION: ICD-10-CM

## 2024-08-27 PROCEDURE — 97110 THERAPEUTIC EXERCISES: CPT

## 2024-08-27 NOTE — OP THERAPY DAILY TREATMENT
Outpatient Physical Therapy  DAILY TREATMENT     [facility] [place]  [address]  [place] [address]  Phone:  [phone]  Fax:  [phone]    Date: 08/27/2024    Patient: Neli Hernandez  YOB: 1945  MRN: [id number]     Time Calculation    Start time: 1455  Stop time: 1530 Time Calculation (min): 35 minutes         Chief Complaint: Scoliosis    Visit #: 4    SUBJECTIVE:  Back is feeling better.  Pain associated with bending has not got better or worse (still very low ~1/10 pain).  Stops prior to pain increasing.  States she is more conscientious of her posture and maintaining abdominal bracing as needed.      OBJECTIVE:        Therapeutic Exercises (CPT 08999):     1. Nu step, level 2, x10 min, >65 SPM, total 771 steps    2. wall angels, x10    3. Ws against wall, x10    4. clams hooklying, alt R/L with med band, x15    5. TA march, x20    6. DKC ball roll in supine wtih TA bracing, X10    7. STS, x5 reg, x5 staggered stance B    8. standing rows with heavy theratubing, x20    9. scap retraction against wall, x20    10. hooklying SAQ, X15    11. seated pelvic tilt, x10, difficulty with lumbo pelvic movement rather than hip hinge and spine flexion      Therapeutic Exercise Summary: Access Code: [email or web address]  URL: [email or web address]  Date: 08/21/2024  Prepared by: Mikal Aguirre    Exercises  - Seated Scapular Retraction  - 1 x daily - 7 x weekly - 2 sets - 10 reps  - Wall [place]  - 1 x daily - 7 x weekly - 2 sets - 10 reps  - Tra  - 1 x daily - 7 x weekly - 2 sets - 10 reps  - Seated Multifidi Isometric  - 1 x daily - 7 x weekly - 2 sets - 10 reps  - Shoulder Extension with Resistance  - 1-3 x daily - 7 x weekly - 2 sets - 10 reps  - Standing Shoulder Row  - 1-3 x daily - 7 x weekly - 2 sets - 10 reps      Time-based treatments/modalities:    Physical Therapy Timed Treatment Charges  Therapeutic exercise minutes (CPT 56540): 35  minutes        ASSESSMENT:   Response to treatment: Pt performed today's interventions without symptom provocation.  Pt will benefit from continued PT to progress core stabilization and lumbopelvic AROM.    PLAN/RECOMMENDATIONS:   Plan for treatment: therapy treatment to continue next visit.  Planned interventions for next visit: continue with current treatment.

## 2024-08-29 ENCOUNTER — PHYSICAL THERAPY (OUTPATIENT)
Dept: PHYSICAL THERAPY | Facility: REHABILITATION | Age: 79
End: 2024-08-29
Attending: INTERNAL MEDICINE
Payer: MEDICARE

## 2024-08-29 DIAGNOSIS — M41.26 OTHER IDIOPATHIC SCOLIOSIS, LUMBAR REGION: ICD-10-CM

## 2024-08-29 PROCEDURE — 97110 THERAPEUTIC EXERCISES: CPT

## 2024-08-29 NOTE — OP THERAPY DAILY TREATMENT
Outpatient Physical Therapy  DAILY TREATMENT     [facility] [place]  [address]  [place] [address]  Phone:  [phone]  Fax:  [phone]    Date: 08/29/2024    Patient: Neli Hernandez  YOB: 1945  MRN: [id number]     Time Calculation    Start time: 1500  Stop time: 1545 Time Calculation (min): 45 minutes         Chief Complaint: Back Problem    Visit #: 5    SUBJECTIVE:  She has been performing her exercises at home more often. She has been paying attention to her posture when sitting on the couch watching TV; she can tell her posture is starting to get better.     OBJECTIVE:        Therapeutic Exercises (CPT 87485):     1. Nu step, level 2, x10 min L3, >65 SPM, total 747 steps    2. wall angels, x10    3. Ws against wall, x10    4. clams hooklying, alt R/L with med band, x15, 15x B    5. TA march, x20    6. DKC ball roll in supine wtih TA bracing, X10    7. STS, x5 reg, x5 staggered stance B    8. standing rows with heavy theratubing, x20    9. scap retraction against wall, x20    10. hooklying SAQ, X15    11. seated pelvic tilt, x10, difficulty with lumbo pelvic movement rather than hip hinge and spine flexion      Therapeutic Exercise Summary: Access Code: [email or web address]  URL: [email or web address]  Date: 08/21/2024  Prepared by: Mikal Aguirre    Exercises  - Seated Scapular Retraction  - 1 x daily - 7 x weekly - 2 sets - 10 reps  - Wall [place]  - 1 x daily - 7 x weekly - 2 sets - 10 reps  - Tra  - 1 x daily - 7 x weekly - 2 sets - 10 reps  - Seated Multifidi Isometric  - 1 x daily - 7 x weekly - 2 sets - 10 reps  - Shoulder Extension with Resistance  - 1-3 x daily - 7 x weekly - 2 sets - 10 reps  - Standing Shoulder Row  - 1-3 x daily - 7 x weekly - 2 sets - 10 reps      Time-based treatments/modalities:    Physical Therapy Timed Treatment Charges  Therapeutic exercise minutes (CPT 33999): 45 minutes        ASSESSMENT:   Response to treatment: She  was able to perform all exercises and stabilization activities without increased pain or discomfort. She would benefit from continued stabilization exercises.     PLAN/RECOMMENDATIONS:   Plan for treatment: therapy treatment to continue next visit.  Planned interventions for next visit: continue with current treatment.

## 2024-08-29 NOTE — OP THERAPY DAILY TREATMENT
"  Outpatient Physical Therapy  DAILY TREATMENT     [facility] [place]  [address]  [facility] [address]  Phone:  [phone]  Fax:  [phone]    Date: 08/29/2024    Patient: Neli Hernandez  YOB: 1945  MRN: [id number]     Time Calculation                   Chief Complaint: No chief complaint on file.    Visit #: 5    SUBJECTIVE:  ***    OBJECTIVE:  Current objective measures: ***        Exercises/Treatment  Time-based treatments/modalities:           Pain rating (1-10) before treatment:  {PAIN NUMBERS_1-10:45556}  Pain rating (1-10) after treatment:  {PAIN NUMBERS_1-10:63210}    ASSESSMENT:   Response to treatment: ***    PLAN/RECOMMENDATIONS:   Plan for treatment: {AMB OP THERAPY - THERAPY PLAN:[id number]::\"therapy treatment to continue next visit\"}.  Planned interventions for next visit: {PT PLANNED THERAPY INTERVENTIONS:[id number]::\"continue with current treatment\"}.         "

## 2024-09-03 ENCOUNTER — APPOINTMENT (OUTPATIENT)
Dept: PHYSICAL THERAPY | Facility: REHABILITATION | Age: 79
End: 2024-09-03
Attending: INTERNAL MEDICINE
Payer: MEDICARE

## 2024-09-05 ENCOUNTER — PHYSICAL THERAPY (OUTPATIENT)
Dept: PHYSICAL THERAPY | Facility: REHABILITATION | Age: 79
End: 2024-09-05
Attending: INTERNAL MEDICINE
Payer: MEDICARE

## 2024-09-05 DIAGNOSIS — M41.26 OTHER IDIOPATHIC SCOLIOSIS, LUMBAR REGION: ICD-10-CM

## 2024-09-05 PROCEDURE — 97110 THERAPEUTIC EXERCISES: CPT

## 2024-09-05 NOTE — OP THERAPY DAILY TREATMENT
Outpatient Physical Therapy  DAILY TREATMENT     [facility] [place]  [address]  [facility] [address]  Phone:  [phone]  Fax:  [phone]    Date: 09/05/2024    Patient: Neli Hernandez  YOB: 1945  MRN: [id number]     Time Calculation    Start time: 1330  Stop time: 1415 Time Calculation (min): 45 minutes         Chief Complaint: Back Problem    Visit #: 6    SUBJECTIVE:  Patient reports that her symptoms are still about the same. Notes that her pain is maybe a little less very frequent.     OBJECTIVE:  Current objective measures:           Therapeutic Exercises (CPT 96496):       Therapeutic Exercise Summary: Access Code: [email or web address]  URL: [email or web address]  Date: 09/05/2024  Prepared by: Mikal Aguirre    Exercises  - Seated Scapular Retraction  - 1 x daily - 7 x weekly - 2 sets - 10 reps  - Wall [place]  - 1 x daily - 7 x weekly - 2 sets - 10 reps  - Tra  - 1 x daily - 7 x weekly - 2 sets - 10 reps  - Seated Multifidi Isometric  - 1 x daily - 7 x weekly - 2 sets - 10 reps  - Shoulder Extension with Resistance  - 1-3 x daily - 7 x weekly - 2 sets - 10 reps  - Standing Shoulder Row  - 1-3 x daily - 7 x weekly - 2 sets - 10 reps  - Supine Hamstring Stretch  - 3 x daily - 7 x weekly - 1 sets - 3 reps - 15sec hold  - Modified [place] Stretch  - 3 x daily - 7 x weekly - 1 sets - 3 reps - 15sec hold  - Quadruped [place]  - 1 x daily - 7 x weekly - 2 sets - 10 reps  - Supine Single Arm Pectoralis Stretch  - 3 x daily - 7 x weekly - 1 sets - 3 reps - 15sec hold      Time-based treatments/modalities:    Physical Therapy Timed Treatment Charges  Therapeutic exercise minutes (CPT 58033): 40 minutes      Pain rating (1-10) before treatment:  2  Pain rating (1-10) after treatment:  2    ASSESSMENT:   Response to treatment: Patient is responding fair to therapy. Notes no significant changes in pain. Does state a decrease in frequency.     PLAN/RECOMMENDATIONS:    Plan for treatment: therapy treatment to continue next visit.  Planned interventions for next visit: continue with current treatment.

## 2024-09-10 ENCOUNTER — PHYSICAL THERAPY (OUTPATIENT)
Dept: PHYSICAL THERAPY | Facility: REHABILITATION | Age: 79
End: 2024-09-10
Attending: INTERNAL MEDICINE
Payer: MEDICARE

## 2024-09-10 DIAGNOSIS — M41.26 OTHER IDIOPATHIC SCOLIOSIS, LUMBAR REGION: ICD-10-CM

## 2024-09-10 PROCEDURE — 97110 THERAPEUTIC EXERCISES: CPT

## 2024-09-10 NOTE — OP THERAPY DAILY TREATMENT
Outpatient Physical Therapy  DAILY TREATMENT     [facility] [place]  [address] [address]  [place] [place] [address]  Phone:  [phone]  Fax:  [phone]    Date: 09/10/2024    Patient: Neli Hernandez  YOB: 1945  MRN: [id number]     Time Calculation    Start time: 1545  Stop time: 1630 Time Calculation (min): 45 minutes         Chief Complaint: Back Problem    Visit #: 7    SUBJECTIVE:  Patient notes an overall decrease in symptoms. Notes only soreness for 5min.     OBJECTIVE:  Current objective measures:           Therapeutic Exercises (CPT 54921):       Therapeutic Exercise Summary: Access Code: [email or web address]  URL: [email or web address]  Date: 09/05/2024  Prepared by: Mikal Aguirre    Exercises  - Seated Scapular Retraction  - 1 x daily - 7 x weekly - 2 sets - 10 reps  - Wall Kline  - 1 x daily - 7 x weekly - 2 sets - 10 reps  - Tra  - 1 x daily - 7 x weekly - 2 sets - 10 reps  - Seated Multifidi Isometric  - 1 x daily - 7 x weekly - 2 sets - 10 reps  - Shoulder Extension with Resistance  - 1-3 x daily - 7 x weekly - 2 sets - 10 reps  - Standing Shoulder Row  - 1-3 x daily - 7 x weekly - 2 sets - 10 reps  - Supine Hamstring Stretch  - 3 x daily - 7 x weekly - 1 sets - 3 reps - 15sec hold  - Modified Derrell Stretch  - 3 x daily - 7 x weekly - 1 sets - 3 reps - 15sec hold  - Quadruped Cat Cow  - 1 x daily - 7 x weekly - 2 sets - 10 reps  - Supine Single Arm Pectoralis Stretch  - 3 x daily - 7 x weekly - 1 sets - 3 reps - 15sec hold      Time-based treatments/modalities:    Physical Therapy Timed Treatment Charges  Therapeutic exercise minutes (CPT 41588): 40 minutes      Pain rating (1-10) before treatment:  0  Pain rating (1-10) after treatment:  0    ASSESSMENT:   Response to treatment: Patient to now trial a full HEP for 2 weeks. Plan to discharge in 1 visit or if patient does not feel she needs a follow up.     PLAN/RECOMMENDATIONS:   Plan for treatment: therapy  treatment to continue next visit.  Planned interventions for next visit: continue with current treatment.

## 2024-09-12 ENCOUNTER — APPOINTMENT (OUTPATIENT)
Dept: PHYSICAL THERAPY | Facility: REHABILITATION | Age: 79
End: 2024-09-12
Attending: INTERNAL MEDICINE
Payer: MEDICARE

## 2024-09-16 PROCEDURE — RXMED WILLOW AMBULATORY MEDICATION CHARGE: Performed by: INTERNAL MEDICINE

## 2024-09-19 ENCOUNTER — PHARMACY VISIT (OUTPATIENT)
Dept: PHARMACY | Facility: MEDICAL CENTER | Age: 79
End: 2024-09-19
Payer: COMMERCIAL

## 2024-09-20 ENCOUNTER — APPOINTMENT (OUTPATIENT)
Dept: PHYSICAL THERAPY | Facility: REHABILITATION | Age: 79
End: 2024-09-20
Attending: INTERNAL MEDICINE
Payer: MEDICARE

## 2024-09-30 ENCOUNTER — PHARMACY VISIT (OUTPATIENT)
Dept: PHARMACY | Facility: MEDICAL CENTER | Age: 79
End: 2024-09-30
Payer: COMMERCIAL

## 2024-09-30 PROCEDURE — RXMED WILLOW AMBULATORY MEDICATION CHARGE: Performed by: INTERNAL MEDICINE

## 2024-10-07 ENCOUNTER — OFFICE VISIT (OUTPATIENT)
Dept: CARDIOLOGY | Facility: MEDICAL CENTER | Age: 79
End: 2024-10-07
Attending: INTERNAL MEDICINE
Payer: MEDICARE

## 2024-10-07 VITALS
DIASTOLIC BLOOD PRESSURE: 70 MMHG | OXYGEN SATURATION: 94 % | HEIGHT: 62 IN | HEART RATE: 87 BPM | WEIGHT: 126 LBS | SYSTOLIC BLOOD PRESSURE: 104 MMHG | BODY MASS INDEX: 23.19 KG/M2

## 2024-10-07 DIAGNOSIS — E78.2 MIXED HYPERLIPIDEMIA: ICD-10-CM

## 2024-10-07 DIAGNOSIS — I10 ESSENTIAL HYPERTENSION: ICD-10-CM

## 2024-10-07 DIAGNOSIS — R93.1 AGATSTON CAC SCORE 200-399: ICD-10-CM

## 2024-10-07 PROCEDURE — 99214 OFFICE O/P EST MOD 30 MIN: CPT | Performed by: INTERNAL MEDICINE

## 2024-10-07 PROCEDURE — 99213 OFFICE O/P EST LOW 20 MIN: CPT | Performed by: INTERNAL MEDICINE

## 2024-10-07 PROCEDURE — 99212 OFFICE O/P EST SF 10 MIN: CPT | Performed by: INTERNAL MEDICINE

## 2024-10-07 PROCEDURE — 3074F SYST BP LT 130 MM HG: CPT | Performed by: INTERNAL MEDICINE

## 2024-10-07 PROCEDURE — 3078F DIAST BP <80 MM HG: CPT | Performed by: INTERNAL MEDICINE

## 2024-10-07 ASSESSMENT — ENCOUNTER SYMPTOMS
DOUBLE VISION: 0
WEAKNESS: 0
SHORTNESS OF BREATH: 0
HEADACHES: 0
BRUISES/BLEEDS EASILY: 0
NERVOUS/ANXIOUS: 0
CLAUDICATION: 0
ABDOMINAL PAIN: 0
PALPITATIONS: 0
NEUROLOGICAL NEGATIVE: 1
RESPIRATORY NEGATIVE: 1
COUGH: 0
GASTROINTESTINAL NEGATIVE: 1
VOMITING: 0
NAUSEA: 0
FEVER: 0
MUSCULOSKELETAL NEGATIVE: 1
DIZZINESS: 0
PSYCHIATRIC NEGATIVE: 1
DEPRESSION: 0
BLURRED VISION: 0
EYES NEGATIVE: 1
WEIGHT LOSS: 0
FOCAL WEAKNESS: 0
CHILLS: 0
MYALGIAS: 0
CARDIOVASCULAR NEGATIVE: 1

## 2024-10-07 ASSESSMENT — FIBROSIS 4 INDEX: FIB4 SCORE: 1.33

## 2024-10-10 ENCOUNTER — PHYSICAL THERAPY (OUTPATIENT)
Dept: PHYSICAL THERAPY | Facility: REHABILITATION | Age: 79
End: 2024-10-10
Attending: INTERNAL MEDICINE
Payer: MEDICARE

## 2024-10-10 DIAGNOSIS — M41.26 OTHER IDIOPATHIC SCOLIOSIS, LUMBAR REGION: ICD-10-CM

## 2024-10-10 PROCEDURE — 97110 THERAPEUTIC EXERCISES: CPT

## 2024-11-23 PROCEDURE — RXMED WILLOW AMBULATORY MEDICATION CHARGE: Performed by: INTERNAL MEDICINE

## 2024-11-25 ENCOUNTER — TELEPHONE (OUTPATIENT)
Dept: MEDICAL GROUP | Facility: PHYSICIAN GROUP | Age: 79
End: 2024-11-25
Payer: MEDICARE

## 2024-12-01 ENCOUNTER — PHARMACY VISIT (OUTPATIENT)
Dept: PHARMACY | Facility: MEDICAL CENTER | Age: 79
End: 2024-12-01
Payer: COMMERCIAL

## 2024-12-01 DIAGNOSIS — R73.03 PREDIABETES: ICD-10-CM

## 2024-12-01 DIAGNOSIS — I10 ESSENTIAL HYPERTENSION: ICD-10-CM

## 2024-12-01 DIAGNOSIS — E03.9 ACQUIRED HYPOTHYROIDISM: ICD-10-CM

## 2024-12-01 DIAGNOSIS — E55.9 VITAMIN D DEFICIENCY: ICD-10-CM

## 2024-12-01 DIAGNOSIS — E78.2 MIXED HYPERLIPIDEMIA: ICD-10-CM

## 2024-12-05 ENCOUNTER — HOSPITAL ENCOUNTER (OUTPATIENT)
Dept: LAB | Facility: MEDICAL CENTER | Age: 79
End: 2024-12-05
Attending: INTERNAL MEDICINE
Payer: MEDICARE

## 2024-12-05 DIAGNOSIS — E55.9 VITAMIN D DEFICIENCY: ICD-10-CM

## 2024-12-05 DIAGNOSIS — I10 ESSENTIAL HYPERTENSION: ICD-10-CM

## 2024-12-05 DIAGNOSIS — R73.03 PREDIABETES: ICD-10-CM

## 2024-12-05 DIAGNOSIS — E78.2 MIXED HYPERLIPIDEMIA: ICD-10-CM

## 2024-12-05 DIAGNOSIS — E03.9 ACQUIRED HYPOTHYROIDISM: ICD-10-CM

## 2024-12-05 LAB
BASOPHILS # BLD AUTO: 1.1 % (ref 0–1.8)
BASOPHILS # BLD: 0.09 K/UL (ref 0–0.12)
EOSINOPHIL # BLD AUTO: 0.23 K/UL (ref 0–0.51)
EOSINOPHIL NFR BLD: 2.7 % (ref 0–6.9)
ERYTHROCYTE [DISTWIDTH] IN BLOOD BY AUTOMATED COUNT: 45.5 FL (ref 35.9–50)
HCT VFR BLD AUTO: 45.5 % (ref 37–47)
HGB BLD-MCNC: 15.4 G/DL (ref 12–16)
IMM GRANULOCYTES # BLD AUTO: 0.06 K/UL (ref 0–0.11)
IMM GRANULOCYTES NFR BLD AUTO: 0.7 % (ref 0–0.9)
LYMPHOCYTES # BLD AUTO: 2.16 K/UL (ref 1–4.8)
LYMPHOCYTES NFR BLD: 25.7 % (ref 22–41)
MCH RBC QN AUTO: 31.2 PG (ref 27–33)
MCHC RBC AUTO-ENTMCNC: 33.8 G/DL (ref 32.2–35.5)
MCV RBC AUTO: 92.3 FL (ref 81.4–97.8)
MONOCYTES # BLD AUTO: 0.72 K/UL (ref 0–0.85)
MONOCYTES NFR BLD AUTO: 8.6 % (ref 0–13.4)
NEUTROPHILS # BLD AUTO: 5.14 K/UL (ref 1.82–7.42)
NEUTROPHILS NFR BLD: 61.2 % (ref 44–72)
NRBC # BLD AUTO: 0 K/UL
NRBC BLD-RTO: 0 /100 WBC (ref 0–0.2)
PLATELET # BLD AUTO: 268 K/UL (ref 164–446)
PMV BLD AUTO: 10.5 FL (ref 9–12.9)
RBC # BLD AUTO: 4.93 M/UL (ref 4.2–5.4)
WBC # BLD AUTO: 8.4 K/UL (ref 4.8–10.8)

## 2024-12-05 PROCEDURE — 85025 COMPLETE CBC W/AUTO DIFF WBC: CPT

## 2024-12-05 PROCEDURE — 84443 ASSAY THYROID STIM HORMONE: CPT

## 2024-12-05 PROCEDURE — 80053 COMPREHEN METABOLIC PANEL: CPT

## 2024-12-05 PROCEDURE — 80061 LIPID PANEL: CPT

## 2024-12-05 PROCEDURE — 82607 VITAMIN B-12: CPT

## 2024-12-05 PROCEDURE — 84439 ASSAY OF FREE THYROXINE: CPT

## 2024-12-05 PROCEDURE — 83036 HEMOGLOBIN GLYCOSYLATED A1C: CPT

## 2024-12-05 PROCEDURE — 82306 VITAMIN D 25 HYDROXY: CPT

## 2024-12-05 PROCEDURE — 36415 COLL VENOUS BLD VENIPUNCTURE: CPT

## 2024-12-06 LAB
25(OH)D3 SERPL-MCNC: 37 NG/ML (ref 30–100)
ALBUMIN SERPL BCP-MCNC: 4.2 G/DL (ref 3.2–4.9)
ALBUMIN/GLOB SERPL: 1.3 G/DL
ALP SERPL-CCNC: 100 U/L (ref 30–99)
ALT SERPL-CCNC: 43 U/L (ref 2–50)
ANION GAP SERPL CALC-SCNC: 14 MMOL/L (ref 7–16)
AST SERPL-CCNC: 38 U/L (ref 12–45)
BILIRUB SERPL-MCNC: 0.4 MG/DL (ref 0.1–1.5)
BUN SERPL-MCNC: 22 MG/DL (ref 8–22)
CALCIUM ALBUM COR SERPL-MCNC: 9.5 MG/DL (ref 8.5–10.5)
CALCIUM SERPL-MCNC: 9.7 MG/DL (ref 8.5–10.5)
CHLORIDE SERPL-SCNC: 102 MMOL/L (ref 96–112)
CHOLEST SERPL-MCNC: 171 MG/DL (ref 100–199)
CO2 SERPL-SCNC: 21 MMOL/L (ref 20–33)
CREAT SERPL-MCNC: 0.92 MG/DL (ref 0.5–1.4)
FASTING STATUS PATIENT QL REPORTED: NORMAL
GFR SERPLBLD CREATININE-BSD FMLA CKD-EPI: 64 ML/MIN/1.73 M 2
GLOBULIN SER CALC-MCNC: 3.2 G/DL (ref 1.9–3.5)
GLUCOSE SERPL-MCNC: 100 MG/DL (ref 65–99)
HDLC SERPL-MCNC: 35 MG/DL
LDLC SERPL CALC-MCNC: 61 MG/DL
POTASSIUM SERPL-SCNC: 4.5 MMOL/L (ref 3.6–5.5)
PROT SERPL-MCNC: 7.4 G/DL (ref 6–8.2)
SODIUM SERPL-SCNC: 137 MMOL/L (ref 135–145)
T4 FREE SERPL-MCNC: 1.57 NG/DL (ref 0.93–1.7)
TRIGL SERPL-MCNC: 374 MG/DL (ref 0–149)
TSH SERPL-ACNC: 1.64 UIU/ML (ref 0.35–5.5)
VIT B12 SERPL-MCNC: 766 PG/ML (ref 211–911)

## 2024-12-09 LAB
EST. AVERAGE GLUCOSE BLD GHB EST-MCNC: 143 MG/DL
HBA1C MFR BLD: 6.6 % (ref 4–5.6)

## 2024-12-10 ENCOUNTER — OFFICE VISIT (OUTPATIENT)
Dept: MEDICAL GROUP | Facility: PHYSICIAN GROUP | Age: 79
End: 2024-12-10
Payer: MEDICARE

## 2024-12-10 VITALS
SYSTOLIC BLOOD PRESSURE: 102 MMHG | DIASTOLIC BLOOD PRESSURE: 60 MMHG | WEIGHT: 128.3 LBS | RESPIRATION RATE: 16 BRPM | HEIGHT: 62 IN | BODY MASS INDEX: 23.61 KG/M2 | TEMPERATURE: 97.1 F | OXYGEN SATURATION: 98 % | HEART RATE: 76 BPM

## 2024-12-10 DIAGNOSIS — E78.2 MIXED HYPERLIPIDEMIA: ICD-10-CM

## 2024-12-10 DIAGNOSIS — M85.89 OSTEOPENIA OF MULTIPLE SITES: ICD-10-CM

## 2024-12-10 DIAGNOSIS — E03.9 ACQUIRED HYPOTHYROIDISM: ICD-10-CM

## 2024-12-10 DIAGNOSIS — R73.09 ELEVATED HEMOGLOBIN A1C: ICD-10-CM

## 2024-12-10 PROCEDURE — 3074F SYST BP LT 130 MM HG: CPT | Performed by: INTERNAL MEDICINE

## 2024-12-10 PROCEDURE — 99214 OFFICE O/P EST MOD 30 MIN: CPT | Performed by: INTERNAL MEDICINE

## 2024-12-10 PROCEDURE — 3078F DIAST BP <80 MM HG: CPT | Performed by: INTERNAL MEDICINE

## 2024-12-10 RX ORDER — CLINDAMYCIN HYDROCHLORIDE 300 MG/1
CAPSULE ORAL
COMMUNITY
Start: 2024-09-20 | End: 2024-12-10

## 2024-12-10 ASSESSMENT — FIBROSIS 4 INDEX: FIB4 SCORE: 1.69

## 2024-12-10 NOTE — PROGRESS NOTES
Subjective:   Chief Complaint/History of Present Illness:  Neli Hernandez is a 79 y.o. female established patient who presents today to discuss medical problems as listed below. Neli is unaccompanied for today's visit.       Problem   Elevated Hemoglobin A1c    A1c is 6.6,  first time every above 6.5. she relates this to recent dietary indiscretion at the holidays. Plans to change her diet accordingly. Has been prediabetic for much of her 70's, no prior pharmacotherapy. Family history of diabetes. Normal BMI.     Osteopenia of Multiple Sites    Bone Density (7/2024):  lumbar spine T score of -1.2 (-1.1 in 2022, -1.7 in 2020)   proximal left femur T score of -1.6 (-1.7 in 2022, -1.6 in 2020)    When compared with the most recent study dated 6/6/2022, there has been a 1.9% decrease in the bone mineral density of the lumbar spine and a 2.8% increase in the bone mineral density of the left femur.     IMPRESSION:  1.  According to the World Health Organization classification, bone mineral density of this patient is osteopenic in the lumbar spine and left proximal femur.     10-year Probability of Fracture:  Major Osteoporotic     18.3%  Hip     3.9%    Osteopenia noted back in 2012, she is on calcium and vitamin D since that time. Prior history of wrist fracture.     Acquired Hypothyroidism     Latest Reference Range & Units 06/06/24 10:09 12/05/24 13:30   Free T-4 0.93 - 1.70 ng/dL 1.82 (H) 1.57   TSH 0.350 - 5.500 uIU/mL 1.880 1.640     Maintained on levothyroxine for many years, well controlled. She previously followed with endocrinology, Dr. Win. She denies any fluctuations or changes to her dose for quite some time. She denies any signs or symptoms of over treatment or under treatment at this time.    Current regimen: levothyroxine 50 mcg 5 days per week and 100 mcg on Tues/Thurs     Mixed Hyperlipidemia     Latest Reference Range & Units 06/06/24 10:09 12/05/24 13:30   Cholesterol,Tot 100 - 199 mg/dL  161 171   Triglycerides 0 - 149 mg/dL 357 (H) 374 (H)   HDL >=40 mg/dL 32 ! 35 !   LDL <100 mg/dL 58 61     The 10-year ASCVD risk score (Shayan SOLIS, et al., 2019) is: 18%    CT cardiac score (2023):  Coronary calcification:  LMA - 0  LCX - 47  LAD - 304  RCA - 1     Total Calcium Score: 352    She reports using lovastatin since her 60's. It was started due to elevated cholesterol values, she denies any history of cardiovascular or cerebrovascular disease.     She reports that her triglycerides have never been as high as they are now. No history of pancreatitis. She has never been on fenofibrate medications.  Elevated CT cardiac score so she is now on statin therapy.    Current regimen: Lovastatin 20 mg nightly plus ezetimibe 10 mg daily plus fish oil 4 g daily  Previous regimen: Lovastatin 40 mg daily          Current Medications:  Current Outpatient Medications Ordered in Epic   Medication Sig Dispense Refill    lovastatin (MEVACOR) 20 MG Tab Take 1 Tablet by mouth every evening. 100 Tablet 1    levothyroxine (SYNTHROID) 50 MCG Tab Take 1 tablet(50mcg) by mouth 5 days per week and 2 tabs(100mcg) on Tues/Thurs 124 Tablet 3    triamterene-hctz (MAXZIDE-25/DYAZIDE) 37.5-25 MG Tab TAKE ONE TABLET BY MOUTH EVERY  Tablet 3    losartan (COZAAR) 100 MG Tab TAKE ONE TABLET BY MOUTH ONE TIME DAILY 100 Tablet 3    ezetimibe (ZETIA) 10 MG Tab Take 1 Tablet by mouth every day. 100 Tablet 3    metoprolol SR (TOPROL XL) 25 MG TABLET SR 24 HR Take 1 Tablet by mouth every day. 100 Tablet 3    Phytosterol Esters (CHOLEST CARE PO) Take 800 mg by mouth 3 times a day. CholestaCare- Propriatery Sterol Blend      coenzyme Q-10 100 MG Cap capsule Take 100 mg by mouth every day.      aspirin EC (ECOTRIN) 81 MG Tablet Delayed Response Take 1 Tablet by mouth every day. 100 Tablet 3    Cholecalciferol (VITAMIN D3) 2000 UNIT Cap Take 1 Capsule by mouth every day. 100 Capsule 3     No current [facility]-ordered facility-administered  "medications on file.          Objective:   Physical Exam:    Vitals: /60 (BP Location: Left arm, Patient Position: Sitting, BP Cuff Size: Adult)   Pulse 76   Temp 36.2 °C (97.1 °F) (Temporal)   Resp 16   Ht 1.562 m (5' 1.5\")   Wt 58.2 kg (128 lb 4.8 oz)   SpO2 98%    BMI: Body mass index is 23.85 kg/m².  Physical Exam  Constitutional:       General: She is not in acute distress.     Appearance: Normal appearance. She is not ill-appearing.   HENT:      Right Ear: External ear normal. There is no impacted cerumen.      Left Ear: External ear normal.   Eyes:      General: No scleral icterus.     Conjunctiva/sclera: Conjunctivae normal.   Cardiovascular:      Rate and Rhythm: Normal rate and regular rhythm.      Pulses: Normal pulses.      Heart sounds: No murmur heard.  Pulmonary:      Effort: Pulmonary effort is normal. No respiratory distress.      Breath sounds: No wheezing or rhonchi.   Abdominal:      General: Bowel sounds are normal. There is no distension.      Palpations: Abdomen is soft.      Tenderness: There is no abdominal tenderness.   Musculoskeletal:      Right lower leg: No edema.      Left lower leg: No edema.   Lymphadenopathy:      Cervical: No cervical adenopathy.   Skin:     General: Skin is warm and dry.      Findings: No rash.   Neurological:      Gait: Gait normal.   Psychiatric:         Mood and Affect: Mood normal.         Behavior: Behavior normal.         Thought Content: Thought content normal.         Judgment: Judgment normal.               Assessment and Plan:   Neli is a 79 y.o. female with the following:  Problem List Items Addressed This Visit       Acquired hypothyroidism     Chronic and improved problem, free T4 back to normal level, continue usual regimen of levothyroxine 50 mcg 5 days per week and 100 mcg 2 days per week.          Relevant Orders    TSH WITH REFLEX TO FT4    Elevated hemoglobin A1c     New problem, related to recent dietary indiscretion. She enjoys " breads and rice. Plans to reduce her portion size and try to limit frequency of carbohydrates. Recheck in 3 months. No indication to start pharmacotherapy at this time. If A1c remains >6.5 on recheck then will plan to add diabetes to her problem list. Already appropriately on statin, ARB, and aspirin.         Relevant Orders    HEMOGLOBIN A1C    Mixed hyperlipidemia     Chronic ongoing issue, she attributes higher readings to recent holiday celebrations. She will be more diligent with her diet and recheck in 3 months before she follows up with Dr. Velasco of cardiology. Continue current regimen with lovastatin 20 mg nightly, ezetimibe 10 mg daily, and fish oil.          Relevant Orders    Comp Metabolic Panel    Lipid Profile    Osteopenia of multiple sites     Chronic ongoing problem, elevated FRAX with normal Tscores. Would like to hold off on prescription grade treatment at this time which I think is reasonable. Continue calcium, vitamin D, and regular weight bearing exercise. Recheck again in 2026.               RTC: Return in about 6 months (around 6/10/2025).    I spent a total of 32 minutes with record review, exam, communication with the patient, communication with other providers, and documentation of this encounter.      PLEASE NOTE: This dictation was created using voice recognition software. I have made every reasonable attempt to correct obvious errors, but I expect that there are errors of grammar and possibly content that I did not discover before finalizing the note.      Fariha Gold, DO  Geriatric and Internal Medicine  [facility]

## 2024-12-10 NOTE — ASSESSMENT & PLAN NOTE
Chronic ongoing problem, elevated FRAX with normal Tscores. Would like to hold off on prescription grade treatment at this time which I think is reasonable. Continue calcium, vitamin D, and regular weight bearing exercise. Recheck again in 2026.

## 2024-12-10 NOTE — ASSESSMENT & PLAN NOTE
Chronic and improved problem, free T4 back to normal level, continue usual regimen of levothyroxine 50 mcg 5 days per week and 100 mcg 2 days per week.

## 2024-12-10 NOTE — ASSESSMENT & PLAN NOTE
New problem, related to recent dietary indiscretion. She enjoys breads and rice. Plans to reduce her portion size and try to limit frequency of carbohydrates. Recheck in 3 months. No indication to start pharmacotherapy at this time. If A1c remains >6.5 on recheck then will plan to add diabetes to her problem list. Already appropriately on statin, ARB, and aspirin.

## 2024-12-10 NOTE — ASSESSMENT & PLAN NOTE
Chronic ongoing issue, she attributes higher readings to recent holiday celebrations. She will be more diligent with her diet and recheck in 3 months before she follows up with Dr. Velasco of cardiology. Continue current regimen with lovastatin 20 mg nightly, ezetimibe 10 mg daily, and fish oil.

## 2025-01-14 PROCEDURE — RXMED WILLOW AMBULATORY MEDICATION CHARGE: Performed by: INTERNAL MEDICINE

## 2025-01-17 ENCOUNTER — PHARMACY VISIT (OUTPATIENT)
Dept: PHARMACY | Facility: MEDICAL CENTER | Age: 80
End: 2025-01-17
Payer: COMMERCIAL

## 2025-02-17 DIAGNOSIS — E03.9 ACQUIRED HYPOTHYROIDISM: ICD-10-CM

## 2025-02-18 PROCEDURE — RXMED WILLOW AMBULATORY MEDICATION CHARGE: Performed by: INTERNAL MEDICINE

## 2025-02-18 RX ORDER — LEVOTHYROXINE SODIUM 50 UG/1
TABLET ORAL
Qty: 124 TABLET | Refills: 3 | Status: SHIPPED | OUTPATIENT
Start: 2025-02-18

## 2025-02-18 NOTE — TELEPHONE ENCOUNTER
Received request via: Pharmacy    Was the patient seen in the last year in this department? Yes    Does the patient have an active prescription (recently filled or refills available) for medication(s) requested? No    Does the patient have [facility] and need 100-day supply? (This applies to ALL medications) Yes, quantity updated to 100 days

## 2025-02-19 ENCOUNTER — PHARMACY VISIT (OUTPATIENT)
Dept: PHARMACY | Facility: MEDICAL CENTER | Age: 80
End: 2025-02-19
Payer: COMMERCIAL

## 2025-04-08 ENCOUNTER — HOSPITAL ENCOUNTER (OUTPATIENT)
Dept: LAB | Facility: MEDICAL CENTER | Age: 80
End: 2025-04-08
Attending: INTERNAL MEDICINE
Payer: MEDICARE

## 2025-04-08 DIAGNOSIS — E78.2 MIXED HYPERLIPIDEMIA: ICD-10-CM

## 2025-04-08 DIAGNOSIS — E03.9 ACQUIRED HYPOTHYROIDISM: ICD-10-CM

## 2025-04-08 DIAGNOSIS — R73.09 ELEVATED HEMOGLOBIN A1C: ICD-10-CM

## 2025-04-08 LAB
ALBUMIN SERPL BCP-MCNC: 4.5 G/DL (ref 3.2–4.9)
ALBUMIN/GLOB SERPL: 1.4 G/DL
ALP SERPL-CCNC: 110 U/L (ref 30–99)
ALT SERPL-CCNC: 68 U/L (ref 2–50)
ANION GAP SERPL CALC-SCNC: 16 MMOL/L (ref 7–16)
AST SERPL-CCNC: 50 U/L (ref 12–45)
BILIRUB SERPL-MCNC: 0.6 MG/DL (ref 0.1–1.5)
BUN SERPL-MCNC: 18 MG/DL (ref 8–22)
CALCIUM ALBUM COR SERPL-MCNC: 9.8 MG/DL (ref 8.5–10.5)
CALCIUM SERPL-MCNC: 10.2 MG/DL (ref 8.5–10.5)
CHLORIDE SERPL-SCNC: 101 MMOL/L (ref 96–112)
CHOLEST SERPL-MCNC: 168 MG/DL (ref 100–199)
CO2 SERPL-SCNC: 23 MMOL/L (ref 20–33)
CREAT SERPL-MCNC: 0.88 MG/DL (ref 0.5–1.4)
EST. AVERAGE GLUCOSE BLD GHB EST-MCNC: 140 MG/DL
GFR SERPLBLD CREATININE-BSD FMLA CKD-EPI: 67 ML/MIN/1.73 M 2
GLOBULIN SER CALC-MCNC: 3.3 G/DL (ref 1.9–3.5)
GLUCOSE SERPL-MCNC: 98 MG/DL (ref 65–99)
HBA1C MFR BLD: 6.5 % (ref 4–5.6)
HDLC SERPL-MCNC: 35 MG/DL
LDLC SERPL CALC-MCNC: 55 MG/DL
POTASSIUM SERPL-SCNC: 4.3 MMOL/L (ref 3.6–5.5)
PROT SERPL-MCNC: 7.8 G/DL (ref 6–8.2)
SODIUM SERPL-SCNC: 140 MMOL/L (ref 135–145)
TRIGL SERPL-MCNC: 388 MG/DL (ref 0–149)
TSH SERPL DL<=0.005 MIU/L-ACNC: 2.35 UIU/ML (ref 0.38–5.33)

## 2025-04-08 PROCEDURE — 83036 HEMOGLOBIN GLYCOSYLATED A1C: CPT

## 2025-04-08 PROCEDURE — 80053 COMPREHEN METABOLIC PANEL: CPT

## 2025-04-08 PROCEDURE — 84443 ASSAY THYROID STIM HORMONE: CPT

## 2025-04-08 PROCEDURE — 80061 LIPID PANEL: CPT

## 2025-04-08 PROCEDURE — 36415 COLL VENOUS BLD VENIPUNCTURE: CPT

## 2025-04-11 ENCOUNTER — OFFICE VISIT (OUTPATIENT)
Dept: CARDIOLOGY | Facility: MEDICAL CENTER | Age: 80
End: 2025-04-11
Attending: INTERNAL MEDICINE
Payer: MEDICARE

## 2025-04-11 ENCOUNTER — PHARMACY VISIT (OUTPATIENT)
Dept: PHARMACY | Facility: MEDICAL CENTER | Age: 80
End: 2025-04-11
Payer: COMMERCIAL

## 2025-04-11 VITALS
RESPIRATION RATE: 16 BRPM | BODY MASS INDEX: 23.55 KG/M2 | WEIGHT: 128 LBS | HEART RATE: 78 BPM | SYSTOLIC BLOOD PRESSURE: 104 MMHG | OXYGEN SATURATION: 96 % | HEIGHT: 62 IN | DIASTOLIC BLOOD PRESSURE: 62 MMHG

## 2025-04-11 DIAGNOSIS — E78.2 MIXED HYPERLIPIDEMIA: ICD-10-CM

## 2025-04-11 DIAGNOSIS — R93.1 AGATSTON CAC SCORE 200-399: ICD-10-CM

## 2025-04-11 DIAGNOSIS — I10 ESSENTIAL HYPERTENSION: ICD-10-CM

## 2025-04-11 PROCEDURE — 99213 OFFICE O/P EST LOW 20 MIN: CPT | Performed by: INTERNAL MEDICINE

## 2025-04-11 PROCEDURE — 3074F SYST BP LT 130 MM HG: CPT | Performed by: INTERNAL MEDICINE

## 2025-04-11 PROCEDURE — 99214 OFFICE O/P EST MOD 30 MIN: CPT | Performed by: INTERNAL MEDICINE

## 2025-04-11 PROCEDURE — 99212 OFFICE O/P EST SF 10 MIN: CPT | Performed by: INTERNAL MEDICINE

## 2025-04-11 PROCEDURE — RXMED WILLOW AMBULATORY MEDICATION CHARGE: Performed by: INTERNAL MEDICINE

## 2025-04-11 PROCEDURE — 3078F DIAST BP <80 MM HG: CPT | Performed by: INTERNAL MEDICINE

## 2025-04-11 RX ORDER — CHLORAL HYDRATE 500 MG
1000 CAPSULE ORAL
COMMUNITY

## 2025-04-11 RX ORDER — FENOFIBRATE 145 MG/1
145 TABLET, FILM COATED ORAL DAILY
Qty: 100 TABLET | Refills: 3 | Status: SHIPPED | OUTPATIENT
Start: 2025-04-11

## 2025-04-11 ASSESSMENT — ENCOUNTER SYMPTOMS
FEVER: 0
COUGH: 0
WEIGHT LOSS: 0
CONSTITUTIONAL NEGATIVE: 1
PALPITATIONS: 0
NERVOUS/ANXIOUS: 0
VOMITING: 0
HEADACHES: 0
CARDIOVASCULAR NEGATIVE: 1
WEAKNESS: 0
DEPRESSION: 0
MYALGIAS: 0
NAUSEA: 0
ABDOMINAL PAIN: 0
MUSCULOSKELETAL NEGATIVE: 1
CHILLS: 0
EYES NEGATIVE: 1
BLURRED VISION: 0
GASTROINTESTINAL NEGATIVE: 1
NEUROLOGICAL NEGATIVE: 1
SHORTNESS OF BREATH: 0
FOCAL WEAKNESS: 0
DOUBLE VISION: 0
BRUISES/BLEEDS EASILY: 0
DIZZINESS: 0
CLAUDICATION: 0
RESPIRATORY NEGATIVE: 1
PSYCHIATRIC NEGATIVE: 1

## 2025-04-11 ASSESSMENT — FIBROSIS 4 INDEX: FIB4 SCORE: 1.76

## 2025-04-11 NOTE — PROGRESS NOTES
Chief Complaint   Patient presents with    Follow-Up     FV DX:Thoracic aorta atherosclerosis       Subjective     Neli Hernandez is a 79 y.o. female who presents today for follow up of positive CT coronary calcium study, hypertension and hyperlipidemia.    Since the patient's last visit on 10/07/24, she has been doing well clinically from cardiac standpoint. She denies fatigue, chest pain, shortness of breath, palpitations, lower extremity edema, dizziness or syncope. She has been inactive.     Past Medical History:   Diagnosis Date    Atypical chest pain 01/23/2014    CAD (coronary artery disease)     Cataract     Change in stool habits 12/20/2022    She reports her BM's have slowly changed over recent time. She will have soft stools 3-4 times a day and then over a period of days it will transition into diarrhea and then reset. This cycles about every 7-10 days. No changes in eating habits. Thyroid has been well controlled. No history of Hpylori, SIBO, or Celiac disease. No concerns on colonoscopy in June 2021. No associated weight loss.    Elevated coronary artery calcium score 07/05/2023    Family history of coronary artery disease 01/23/2014    H/O partial thyroidectomy 01/23/2014    Resection due to tumor, found to be benign on pathology. Performed in her 40's. She has remained on thyroid replacement since that time without issue.    Hyperlipidemia     Hypertension     Hypothyroidism, postop     Major depressive disorder, recurrent episode, mild (HCC) 02/23/2023    Depression Screening (2/2023)     Little interest or pleasure in doing things?  1 - several days   Feeling down, depressed , or hopeless? 1 - several days   Trouble falling or staying asleep, or sleeping too much?  2 - more than half the days   Feeling tired or having little energy?  2 - more than half the days   Poor appetite or overeating?  0 - not at all   Feeling bad about yourself - or that y    Menopause 11/27/2019    History of menopause  "and osteopenia with outdated bone density examination.    Nonspecific abnormal function study, cardiovascular 12/20/2022    Pain of both hip joints 01/20/2017    Pain of left great toe 12/20/2022    She reports left toe pain with associated erythema and swelling last week, no warmth noted. Occurred at base of first metatarsal on the left foot, improved after a few days. No recent uric acid level. Eats a healthy diet with limited animal products.    Prediabetes 07/15/2019      Ref. Range 12/13/2021 11:48  Glycohemoglobin Latest Ref Range: 4.0 - 5.6 % 5.3  Estim. Avg Glu Latest Units: mg/dL 105   A1c 6.0 in mid August 2019. No prior A1c checks, she denies prior knowledge of pre-diabetes. She reports stable weight and has a normal BMI. She has had a decompensation of her triglycerides on recent analysis. No blurred vision, polyuria, or polydipsia. She has brought down h     Past Surgical History:   Procedure Laterality Date    APPENDECTOMY      LAMINOTOMY      cervical fusion; disc bulge?    PRIMARY C SECTION      cervix repair and c section    THYROIDECTOMY      TONSILLECTOMY      US-CYST ASPIRATION-BREAST INITIAL Left 1990's     Family History   Problem Relation Age of Onset    Cancer Maternal Aunt         breast    Stroke Mother         TIA    Heart Disease Father      Social History     Socioeconomic History    Marital status:      Spouse name: Not on file    Number of children: Not on file    Years of education: Not on file    Highest education level: 12th grade   Occupational History    Not on file   Tobacco Use    Smoking status: Never    Smokeless tobacco: Never    Tobacco comments:     continued abstinence   Vaping Use    Vaping status: Never Used   Substance and Sexual Activity    Alcohol use: Yes     Alcohol/week: 0.0 - 0.6 oz     Comment: \"maybe twice a year\"    Drug use: No    Sexual activity: Not Currently     Partners: Male     Birth control/protection: Post-Menopausal   Other Topics Concern    " Not on file   Social History Narrative    Neli was born and raised in [place]. 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 She has a daughter, Deena Kinney (46, [facility]). Hobbies include reading, she has a tower garden in the yard, she enjoys playing online games.     Social Drivers of Health     Financial Resource Strain: Low Risk  (1/10/2024)    Overall Financial Resource Strain (CARDIA)     Difficulty of Paying Living Expenses: Not hard at all   Food Insecurity: No Food Insecurity (1/10/2024)    Hunger Vital Sign     Worried About Running Out of Food in the Last Year: Never true     Ran Out of Food in the Last Year: Never true   Transportation Needs: No Transportation Needs (1/10/2024)    PRAPARE - Transportation     Lack of Transportation (Medical): No     Lack of Transportation (Non-Medical): No   Physical Activity: Sufficiently Active (12/8/2020)    Exercise Vital Sign     Days of Exercise per Week: 6 days     Minutes of Exercise per Session: 30 min   Stress: No Stress Concern Present (1/10/2024)    [place] [employer] - Occupational Stress Questionnaire     Feeling of Stress : Not at all   Social Connections: Moderately Isolated (1/10/2024)    Social Connection and Isolation Panel [NHANES]     Frequency of Communication with Friends and Family: More than three times a week     Frequency of Social Gatherings with Friends and Family: Not on file     Attends Advent Services: Never     Active Member of Clubs or Organizations: No     Attends Club or Organization Meetings: 1 to 4 times per year     Marital Status:    Intimate Partner Violence: Not on file   Housing Stability: Low Risk  (1/10/2024)    Housing Stability Vital Sign     Unable to Pay for Housing in the Last Year: No     Number of Places Lived in the Last Year: 1     Unstable Housing in the Last Year: No     Allergies   Allergen Reactions     Penicillins    (Medications reviewed.)   Outpatient Encounter Medications as of 4/11/2025   Medication Sig Dispense Refill    Omega-3 Fatty Acids (FISH OIL) 1000 MG Cap capsule Take 1,000 mg by mouth 3 times a day with meals.      Multiple Vitamins-Minerals (PRESERVISION AREDS PO) Take  by mouth.      fenofibrate (TRICOR) 145 MG Tab Take 1 Tablet by mouth every day. 100 Tablet 3    levothyroxine (SYNTHROID) 50 MCG Tab Take 1 tablet(50mcg) by mouth 5 days per week and 2 tabs(100mcg) on Tues/Thurs 124 Tablet 3    lovastatin (MEVACOR) 20 MG Tab Take 1 Tablet by mouth every evening. 100 Tablet 1    triamterene-hctz (MAXZIDE-25/DYAZIDE) 37.5-25 MG Tab TAKE ONE TABLET BY MOUTH EVERY  Tablet 3    losartan (COZAAR) 100 MG Tab TAKE ONE TABLET BY MOUTH ONE TIME DAILY 100 Tablet 3    ezetimibe (ZETIA) 10 MG Tab Take 1 Tablet by mouth every day. 100 Tablet 3    metoprolol SR (TOPROL XL) 25 MG TABLET SR 24 HR Take 1 Tablet by mouth every day. 100 Tablet 3    Phytosterol Esters (CHOLEST CARE PO) Take 800 mg by mouth 3 times a day. CholestaCare- Propriatery Sterol Blend      coenzyme Q-10 100 MG Cap capsule Take 100 mg by mouth every day.      aspirin EC (ECOTRIN) 81 MG Tablet Delayed Response Take 1 Tablet by mouth every day. 100 Tablet 3    Cholecalciferol (VITAMIN D3) 2000 UNIT Cap Take 1 Capsule by mouth every day. 100 Capsule 3     No facility-administered encounter medications on file as of 4/11/2025.     Review of Systems   Constitutional: Negative.  Negative for chills, fever, malaise/fatigue and weight loss.   HENT: Negative.  Negative for hearing loss.    Eyes: Negative.  Negative for blurred vision and double vision.   Respiratory: Negative.  Negative for cough and shortness of breath.    Cardiovascular: Negative.  Negative for chest pain, palpitations, claudication and leg swelling.   Gastrointestinal: Negative.  Negative for abdominal pain, nausea and vomiting.   Genitourinary: Negative.  Negative for dysuria  "and urgency.   Musculoskeletal: Negative.  Negative for joint pain and myalgias.   Skin: Negative.  Negative for itching and rash.   Neurological: Negative.  Negative for dizziness, focal weakness, weakness and headaches.   Endo/Heme/Allergies: Negative.  Does not bruise/bleed easily.   Psychiatric/Behavioral: Negative.  Negative for depression. The patient is not nervous/anxious.               Objective     /62 (BP Location: Left arm, Patient Position: Sitting, BP Cuff Size: Adult)   Pulse 78   Resp 16   Ht 1.562 m (5' 1.5\")   Wt 58.1 kg (128 lb)   SpO2 96%   BMI 23.79 kg/m²     Physical Exam  Constitutional:       Appearance: Normal appearance. She is well-developed and normal weight.   HENT:      Head: Normocephalic and atraumatic.   Neck:      Vascular: No JVD.   Cardiovascular:      Rate and Rhythm: Normal rate and regular rhythm.      Heart sounds: Normal heart sounds.   Pulmonary:      Effort: Pulmonary effort is normal.      Breath sounds: Normal breath sounds.   Abdominal:      General: Bowel sounds are normal.      Palpations: Abdomen is soft.      Comments: No hepatosplenomegaly.   Musculoskeletal:         General: Normal range of motion.   Lymphadenopathy:      Cervical: No cervical adenopathy.   Skin:     General: Skin is warm and dry.   Neurological:      Mental Status: She is alert and oriented to person, place, and time.            CARDIAC STUDIES/PROCEDURES:     CT CARDIAC SCORING (02/24/23)  Coronary calcification:  LMA - 0  LCX - 47  LAD - 304  RCA - 1  Total Calcium Score: 352  Percentile: Calcium score is worse than the the 75th percentile for the patient's age and sex.  Other findings:  Heart: Normal size. Normal size ascending aorta, 30 mm. There is moderate descending thoracic aortic atherosclerosis.  IMPRESSION:  Calcium Score of 100-399 AND >75th percentile:     EKG performed on (07/05/23) EKG shows sinus rhythm.     Laboratory results of (04/08/25) were reviewed. Cholesterol " profile of 168/388/3/55 mg/dL noted.  Laboratory results of (06/06/24) Cholesterol profile of 161/357/32/58 mg/dL noted.  Laboratory results of (11/29/23) Cholesterol profile of 161/307/32/68 mg/dL noted.     MYOCARDIAL PERFUSION STUDY CONCLUSIONS (11/06/05)  1. NORMAL SPECT-MYOCARDIAL PERFUSION SCAN.   2. LEFT VENTRICULAR EJECTION FRACTION IS 68%.     Assessment & Plan     1. Agatston CAC score 200-399        2. Essential hypertension        3. Mixed hyperlipidemia  fenofibrate (TRICOR) 145 MG Tab    Comp Metabolic Panel    Lipid Profile          Medical Decision Making: Today's Assessment/Status/Plan:        Positive CT coronary calcium study: She is a 79 year old female with positive CT coronary calcium study, hypertension and hyperlipidemia. She is clinically doing well from coronary standpoint. We will continue with current medical therapy including metoprolol, losartan, lovastatin.  Hypertension: Currently, the average blood pressure is well controlled. We will continue with triamterene-hydrochlorothiazide, beta blockade therapy and angiotensin receptor blockade.  Hyperlipidemia: She is doing well on statin therapy without myalgia symptoms. Her triglyceride levels has been high. We will start fenofibrate. We will repeat labs including fasting lipid profile in 3 months.   Health maintenance: We have discussed the options of performing cardiac studies, however, she has not wished to scheduled any procedures at this time.     We will follow up in 3 months.    CC Sean Mars

## 2025-04-14 ENCOUNTER — TELEPHONE (OUTPATIENT)
Dept: CARDIOLOGY | Facility: MEDICAL CENTER | Age: 80
End: 2025-04-14
Payer: MEDICARE

## 2025-04-14 DIAGNOSIS — E78.2 MIXED HYPERLIPIDEMIA: ICD-10-CM

## 2025-04-14 NOTE — TELEPHONE ENCOUNTER
DEYVI Carrera: Neli Hernandez     Topic/issue: Patient has a question about her medications and stopping one of them, she was started on the fenofibrate (TRICOR) 145 MG Tab- she would like to know if she needs to stop that or stop the lovastatin due to drug interaction please call     Callback Number: [phone] Ok to leave vm     Thank You   Viri VILLALTA

## 2025-04-15 ENCOUNTER — RESULTS FOLLOW-UP (OUTPATIENT)
Dept: MEDICAL GROUP | Facility: PHYSICIAN GROUP | Age: 80
End: 2025-04-15

## 2025-04-15 ENCOUNTER — PHARMACY VISIT (OUTPATIENT)
Dept: PHARMACY | Facility: MEDICAL CENTER | Age: 80
End: 2025-04-15
Payer: COMMERCIAL

## 2025-04-15 DIAGNOSIS — R74.01 TRANSAMINITIS: ICD-10-CM

## 2025-04-15 PROCEDURE — RXMED WILLOW AMBULATORY MEDICATION CHARGE: Performed by: INTERNAL MEDICINE

## 2025-04-15 RX ORDER — ROSUVASTATIN CALCIUM 10 MG/1
10 TABLET, COATED ORAL EVERY EVENING
Qty: 100 TABLET | Refills: 3 | Status: SHIPPED | OUTPATIENT
Start: 2025-04-15 | End: 2026-05-20

## 2025-04-15 NOTE — TELEPHONE ENCOUNTER
Phone Number Called: [phone]     Call outcome: Spoke to patient regarding message below.    Message: Called to discuss. Pt appreciative of call      Yoav Velasco M.D.  You3 hours ago (6:42 AM)     Small increased risk of myalgia with fenofibrate and lovastatin. OK to change lovastatin to rosuvastatin 10 mg QHS. Repeat labs in 3 months. Thank you.  OIM

## 2025-05-21 DIAGNOSIS — I10 BENIGN HYPERTENSION: ICD-10-CM

## 2025-05-21 PROCEDURE — RXMED WILLOW AMBULATORY MEDICATION CHARGE: Performed by: INTERNAL MEDICINE

## 2025-05-21 RX ORDER — LOSARTAN POTASSIUM 100 MG/1
100 TABLET ORAL DAILY
Qty: 100 TABLET | Refills: 3 | Status: SHIPPED | OUTPATIENT
Start: 2025-05-21

## 2025-05-21 RX ORDER — METOPROLOL SUCCINATE 25 MG/1
25 TABLET, EXTENDED RELEASE ORAL
Qty: 100 TABLET | Refills: 3 | Status: SHIPPED | OUTPATIENT
Start: 2025-05-21

## 2025-05-21 NOTE — TELEPHONE ENCOUNTER
Received request via: Pharmacy    Was the patient seen in the last year in this department? Yes    Does the patient have an active prescription (recently filled or refills available) for medication(s) requested? No    Pharmacy Name: [facility]     Does the patient have long-term Plus and need 100-day supply? (This applies to ALL medications) Yes, quantity updated to 100 days

## 2025-05-22 ENCOUNTER — PHARMACY VISIT (OUTPATIENT)
Dept: PHARMACY | Facility: MEDICAL CENTER | Age: 80
End: 2025-05-22
Payer: COMMERCIAL

## 2025-06-03 ENCOUNTER — HOSPITAL ENCOUNTER (OUTPATIENT)
Dept: LAB | Facility: MEDICAL CENTER | Age: 80
End: 2025-06-03
Attending: INTERNAL MEDICINE
Payer: MEDICARE

## 2025-06-03 DIAGNOSIS — R74.01 TRANSAMINITIS: ICD-10-CM

## 2025-06-03 LAB
ALBUMIN SERPL BCP-MCNC: 4.4 G/DL (ref 3.2–4.9)
ALBUMIN/GLOB SERPL: 1.4 G/DL
ALP SERPL-CCNC: 53 U/L (ref 30–99)
ALT SERPL-CCNC: 50 U/L (ref 2–50)
ANION GAP SERPL CALC-SCNC: 13 MMOL/L (ref 7–16)
AST SERPL-CCNC: 51 U/L (ref 12–45)
BILIRUB SERPL-MCNC: 0.4 MG/DL (ref 0.1–1.5)
BUN SERPL-MCNC: 25 MG/DL (ref 8–22)
CALCIUM ALBUM COR SERPL-MCNC: 9.7 MG/DL (ref 8.5–10.5)
CALCIUM SERPL-MCNC: 10 MG/DL (ref 8.5–10.5)
CHLORIDE SERPL-SCNC: 101 MMOL/L (ref 96–112)
CO2 SERPL-SCNC: 23 MMOL/L (ref 20–33)
CREAT SERPL-MCNC: 1.02 MG/DL (ref 0.5–1.4)
GFR SERPLBLD CREATININE-BSD FMLA CKD-EPI: 56 ML/MIN/1.73 M 2
GGT SERPL-CCNC: 21 U/L (ref 7–34)
GLOBULIN SER CALC-MCNC: 3.2 G/DL (ref 1.9–3.5)
GLUCOSE SERPL-MCNC: 103 MG/DL (ref 65–99)
POTASSIUM SERPL-SCNC: 3.6 MMOL/L (ref 3.6–5.5)
PROT SERPL-MCNC: 7.6 G/DL (ref 6–8.2)
SODIUM SERPL-SCNC: 137 MMOL/L (ref 135–145)

## 2025-06-03 PROCEDURE — 36415 COLL VENOUS BLD VENIPUNCTURE: CPT

## 2025-06-03 PROCEDURE — 82977 ASSAY OF GGT: CPT

## 2025-06-03 PROCEDURE — 80053 COMPREHEN METABOLIC PANEL: CPT

## 2025-06-04 ENCOUNTER — RESULTS FOLLOW-UP (OUTPATIENT)
Dept: MEDICAL GROUP | Facility: PHYSICIAN GROUP | Age: 80
End: 2025-06-04

## 2025-06-05 ENCOUNTER — HOSPITAL ENCOUNTER (OUTPATIENT)
Dept: LAB | Facility: MEDICAL CENTER | Age: 80
End: 2025-06-05
Attending: INTERNAL MEDICINE
Payer: MEDICARE

## 2025-06-05 DIAGNOSIS — R74.01 TRANSAMINITIS: Primary | ICD-10-CM

## 2025-06-05 DIAGNOSIS — R74.01 TRANSAMINITIS: ICD-10-CM

## 2025-06-05 PROCEDURE — 80053 COMPREHEN METABOLIC PANEL: CPT

## 2025-06-05 PROCEDURE — 36415 COLL VENOUS BLD VENIPUNCTURE: CPT

## 2025-06-06 LAB
ALBUMIN SERPL BCP-MCNC: 4.4 G/DL (ref 3.2–4.9)
ALBUMIN/GLOB SERPL: 1.3 G/DL
ALP SERPL-CCNC: 54 U/L (ref 30–99)
ALT SERPL-CCNC: 57 U/L (ref 2–50)
ANION GAP SERPL CALC-SCNC: 14 MMOL/L (ref 7–16)
AST SERPL-CCNC: 52 U/L (ref 12–45)
BILIRUB SERPL-MCNC: 0.4 MG/DL (ref 0.1–1.5)
BUN SERPL-MCNC: 30 MG/DL (ref 8–22)
CALCIUM ALBUM COR SERPL-MCNC: 9.7 MG/DL (ref 8.5–10.5)
CALCIUM SERPL-MCNC: 10 MG/DL (ref 8.5–10.5)
CHLORIDE SERPL-SCNC: 105 MMOL/L (ref 96–112)
CO2 SERPL-SCNC: 22 MMOL/L (ref 20–33)
CREAT SERPL-MCNC: 1.09 MG/DL (ref 0.5–1.4)
GFR SERPLBLD CREATININE-BSD FMLA CKD-EPI: 52 ML/MIN/1.73 M 2
GLOBULIN SER CALC-MCNC: 3.3 G/DL (ref 1.9–3.5)
GLUCOSE SERPL-MCNC: 111 MG/DL (ref 65–99)
POTASSIUM SERPL-SCNC: 4.3 MMOL/L (ref 3.6–5.5)
PROT SERPL-MCNC: 7.7 G/DL (ref 6–8.2)
SODIUM SERPL-SCNC: 141 MMOL/L (ref 135–145)

## 2025-06-09 ENCOUNTER — RESULTS FOLLOW-UP (OUTPATIENT)
Dept: MEDICAL GROUP | Facility: LAB | Age: 80
End: 2025-06-09

## 2025-06-10 ENCOUNTER — OFFICE VISIT (OUTPATIENT)
Dept: MEDICAL GROUP | Facility: PHYSICIAN GROUP | Age: 80
End: 2025-06-10
Payer: MEDICARE

## 2025-06-10 ENCOUNTER — HOSPITAL ENCOUNTER (OUTPATIENT)
Dept: RADIOLOGY | Facility: MEDICAL CENTER | Age: 80
End: 2025-06-10
Attending: INTERNAL MEDICINE
Payer: MEDICARE

## 2025-06-10 VITALS
HEART RATE: 68 BPM | TEMPERATURE: 98.7 F | BODY MASS INDEX: 23.02 KG/M2 | DIASTOLIC BLOOD PRESSURE: 68 MMHG | OXYGEN SATURATION: 97 % | SYSTOLIC BLOOD PRESSURE: 110 MMHG | WEIGHT: 125.1 LBS | HEIGHT: 62 IN

## 2025-06-10 DIAGNOSIS — R14.0 BLOATING: ICD-10-CM

## 2025-06-10 DIAGNOSIS — R16.0 HEPATOMEGALY: ICD-10-CM

## 2025-06-10 DIAGNOSIS — N18.31 CKD STAGE 3A, GFR 45-59 ML/MIN: ICD-10-CM

## 2025-06-10 DIAGNOSIS — Z98.890 HISTORY OF NECK SURGERY: ICD-10-CM

## 2025-06-10 DIAGNOSIS — R74.01 TRANSAMINITIS: Primary | ICD-10-CM

## 2025-06-10 DIAGNOSIS — M43.6 NECK STIFFNESS: ICD-10-CM

## 2025-06-10 DIAGNOSIS — K21.9 GASTROESOPHAGEAL REFLUX DISEASE, UNSPECIFIED WHETHER ESOPHAGITIS PRESENT: ICD-10-CM

## 2025-06-10 PROCEDURE — 99214 OFFICE O/P EST MOD 30 MIN: CPT | Performed by: INTERNAL MEDICINE

## 2025-06-10 PROCEDURE — 3074F SYST BP LT 130 MM HG: CPT | Performed by: INTERNAL MEDICINE

## 2025-06-10 PROCEDURE — 3078F DIAST BP <80 MM HG: CPT | Performed by: INTERNAL MEDICINE

## 2025-06-10 PROCEDURE — 72050 X-RAY EXAM NECK SPINE 4/5VWS: CPT

## 2025-06-10 ASSESSMENT — PATIENT HEALTH QUESTIONNAIRE - PHQ9: CLINICAL INTERPRETATION OF PHQ2 SCORE: 0

## 2025-06-10 ASSESSMENT — FIBROSIS 4 INDEX: FIB4 SCORE: 2

## 2025-06-10 NOTE — Clinical Note
REFERRAL APPROVAL NOTICE         Sent on Dea 10, 2025                   Neli Hernandez  [address] [address]                   Dear Ms. Hernandez,    After a careful review of the medical information and benefit coverage, [facility] has processed your referral. See below for additional details.    If applicable, you must be actively enrolled with your insurance for coverage of the authorized service. If you have any questions regarding your coverage, please contact your insurance directly.    REFERRAL INFORMATION   Referral #:  [id number]  Referred-To Provider    Referred-By Provider:  Spine Surgery    OSVALDO Mckeon, ANMOL GIL      [address]  [address]  [facility] [address]  [phone] [address] [facility] [place]  [address]  [facility] [id number] [phone]    Referral Start Date:  06/10/2025  Referral End Date:   06/10/2026             SCHEDULING  If you do not already have an appointment, please call [phone] to make an appointment.     MORE INFORMATION  If you do not already have a [facility] account, sign up at: [email or web address]  You can access your medical information, make appointments, see lab results, billing information, and more.  If you have questions regarding this referral, please contact  the [facility] Referrals department at:             [phone]. Monday - Friday 8:00AM - 5:00PM.     Sincerely,    [facility]

## 2025-06-10 NOTE — PROGRESS NOTES
Subjective:   Chief Complaint/History of Present Illness:  Neli Hernandez is a 79 y.o. female established patient who presents today to discuss medical problems as listed below. Neli is unaccompanied for today's visit.    History of Present Illness  The patient presents for evaluation of elevated liver enzymes, neck issues, and acid reflux.    She has been experiencing persistent neck discomfort, characterized by snapping, crackling, and popping sounds. This is particularly concerning given her history of neck fusion surgery in 2002, which involved the placement of a plate and four screws. Despite these symptoms, she maintains good neck mobility. She expresses a preference to consult with Dr. Epperson, the surgeon who performed her neck fusion, regarding these new symptoms.    She reports elevated liver enzymes and has been taking a liver support supplement, preferring this over other medications if it proves effective. She has not had any abdominal imaging in the past. Occasionally, she experiences bloating, which she attributes to bread consumption. She has been monitoring her intake of saturated fats due to her cardiac history. She has lost some weight intentionally, approximately 3 pounds, and aims to lose a few more. She does not report any nausea, pain during meals, or difficulty swallowing.    She has been experiencing frequent urination, which she attributes to her diuretic medication. She is considering discontinuing this medication due to the inconvenience. She does not report any issues with fluid retention.    She has experienced acid reflux on two occasions.    She had a thyroidectomy due to a thyroid tumor. She had a cyst in her breast that was aspirated.    PAST SURGICAL HISTORY:  - Neck fusion surgery with plate and screws (2002)  - Thyroidectomy  - Breast cyst aspiration    SOCIAL HISTORY  She does not drink alcohol.    FAMILY HISTORY  Her father had a kidney tumor and Cushing's disease. Her  "sister had Cushing's disease and passed away from pancreatic cancer. Her daughter grew a tumor with bacteria from the lung. Her niece had a tumor on the back of her leg. None of these tumors have been cancerous.         Current Medications:  Current Medications and Prescriptions Ordered in Epic[1]       Objective:   Physical Exam:    Vitals: /68 (BP Location: Right arm, Patient Position: Sitting, BP Cuff Size: Adult)   Pulse 68   Temp 37.1 °C (98.7 °F) (Temporal)   Ht 1.562 m (5' 1.5\")   Wt 56.7 kg (125 lb 1.6 oz)   SpO2 97%    BMI: Body mass index is 23.25 kg/m².  Physical Exam  Constitutional:       General: She is not in acute distress.     Appearance: Normal appearance. She is not ill-appearing.   HENT:      Right Ear: External ear normal.      Left Ear: External ear normal.   Eyes:      General: No scleral icterus.     Conjunctiva/sclera: Conjunctivae normal.   Cardiovascular:      Rate and Rhythm: Normal rate and regular rhythm.      Pulses: Normal pulses.   Pulmonary:      Effort: Pulmonary effort is normal. No respiratory distress.      Breath sounds: No wheezing or rhonchi.   Abdominal:      General: Bowel sounds are normal. There is no distension.      Palpations: Abdomen is soft.      Tenderness: There is no abdominal tenderness.   Musculoskeletal:      Right lower leg: No edema.      Left lower leg: No edema.   Lymphadenopathy:      Cervical: No cervical adenopathy.   Skin:     General: Skin is warm and dry.      Findings: No rash.   Psychiatric:         Mood and Affect: Mood normal.         Behavior: Behavior normal.         Thought Content: Thought content normal.         Judgment: Judgment normal.           Results  Labs   - Liver Enzymes: Alkaline phosphatase dropped by half. GGT was normal.    Assessment & Plan  1. Transaminitis  2. Liver cyst  3. Hepatomegaly   - Liver enzymes have shown improvement, particularly with a significant reduction in alkaline phosphatase levels.  - Slight " increase in AST and ALT levels upon retesting; GGT level remains within the normal range.  - Discussed family history of tumors and the persistent elevation of liver enzymes.  - CT scan of the abdomen with IV contrast will be ordered to rule out any potential growths or abnormalities.    4. Neck  stiffness.  5. History of neck surgery.  - Reports snapping, crackling, and popping sounds in the neck, concerning given history of neck fusion surgery in 2002.  - Increased pain and limited mobility.  - Discussed the need to assess the alignment and stability of the hardware.  - X-ray of the neck will be ordered; referral to Dr. Epperson for further evaluation.    6. Acid reflux.  7. Abdominal bloating  - Experiences occasional acid reflux, possibly related to dietary factors such as bread consumption.  - Symptoms include bloating and occasional acid reflux.  - Advised to monitor symptoms and avoid foods that trigger reflux.  - Further evaluation may be necessary if symptoms persist.  - Follow up with CT abdomen/pelvis    8. CKD stage 3a  - Reports frequent urination, potentially related to diuretic medication (triamterene hydrochlorothiazide).  - Increased urine output.  - Discussed monitoring fluid intake and output.  - Reevaluation of medication regimen may be necessary if symptoms persist or worsen.    Follow-up  The patient will follow up in late July or early August.      Assessment and Plan:   Neli is a 79 y.o. female with the following:  Problem List Items Addressed This Visit    None  Visit Diagnoses         Transaminitis    -  Primary    Relevant Orders    CT-ABDOMEN-PELVIS WITH      CKD stage 3a, GFR 45-59 ml/min        Relevant Orders    CT-ABDOMEN-PELVIS WITH      Bloating        Relevant Orders    CT-ABDOMEN-PELVIS WITH      Hepatomegaly        Relevant Orders    CT-ABDOMEN-PELVIS WITH      History of neck surgery        Relevant Orders    DX-CERVICAL SPINE-4+ VIEWS    Referral to Spine Surgery      Neck  stiffness        Relevant Orders    DX-CERVICAL SPINE-4+ VIEWS    Referral to Spine Surgery      Gastroesophageal reflux disease, unspecified whether esophagitis present                   RTC: Return in about 2 months (around 8/10/2025).    I spent a total of 32 minutes with record review, exam, communication with the patient, communication with other providers, and documentation of this encounter.    Verbal consent was acquired by the patient to use afterBOT ambient listening note generation during this visit Yes       PLEASE NOTE: This dictation was created using voice recognition software. I have made every reasonable attempt to correct obvious errors, but I expect that there are errors of grammar and possibly content that I did not discover before finalizing the note.      Fariha Gold, DO  Geriatric and Internal Medicine  [facility]          [1]   Current Outpatient Medications Ordered in Epic   Medication Sig Dispense Refill    metoprolol SR (TOPROL XL) 25 MG TABLET SR 24 HR Take 1 Tablet by mouth every day. 100 Tablet 3    losartan (COZAAR) 100 MG Tab TAKE ONE TABLET BY MOUTH ONE TIME DAILY 100 Tablet 3    rosuvastatin (CRESTOR) 10 MG Tab Take 1 Tablet by mouth every evening. 100 Tablet 3    Omega-3 Fatty Acids (FISH OIL) 1000 MG Cap capsule Take 1,000 mg by mouth 3 times a day with meals.      fenofibrate (TRICOR) 145 MG Tab Take 1 Tablet by mouth every day. 100 Tablet 3    levothyroxine (SYNTHROID) 50 MCG Tab Take 1 tablet(50mcg) by mouth 5 days per week and 2 tabs(100mcg) on Tues/Thurs 124 Tablet 3    triamterene-hctz (MAXZIDE-25/DYAZIDE) 37.5-25 MG Tab TAKE ONE TABLET BY MOUTH EVERY  Tablet 3    ezetimibe (ZETIA) 10 MG Tab Take 1 Tablet by mouth every day. 100 Tablet 3    Phytosterol Esters (CHOLEST CARE PO) Take 800 mg by mouth 3 times a day. CholestaCare- Propriatery Sterol Blend      coenzyme Q-10 100 MG Cap capsule Take 100 mg by mouth every day.      aspirin EC (ECOTRIN) 81  MG Tablet Delayed Response Take 1 Tablet by mouth every day. 100 Tablet 3    Cholecalciferol (VITAMIN D3) 2000 UNIT Cap Take 1 Capsule by mouth every day. 100 Capsule 3     No current [facility]-ordered facility-administered medications on file.

## 2025-06-11 ENCOUNTER — RESULTS FOLLOW-UP (OUTPATIENT)
Dept: MEDICAL GROUP | Facility: PHYSICIAN GROUP | Age: 80
End: 2025-06-11

## 2025-06-19 ENCOUNTER — HOSPITAL ENCOUNTER (OUTPATIENT)
Dept: RADIOLOGY | Facility: MEDICAL CENTER | Age: 80
End: 2025-06-19
Attending: INTERNAL MEDICINE
Payer: MEDICARE

## 2025-06-19 DIAGNOSIS — R74.01 TRANSAMINITIS: ICD-10-CM

## 2025-06-19 DIAGNOSIS — R16.0 HEPATOMEGALY: ICD-10-CM

## 2025-06-19 DIAGNOSIS — R14.0 BLOATING: ICD-10-CM

## 2025-06-19 DIAGNOSIS — N18.31 CKD STAGE 3A, GFR 45-59 ML/MIN: ICD-10-CM

## 2025-06-19 PROCEDURE — 700117 HCHG RX CONTRAST REV CODE 255: Performed by: INTERNAL MEDICINE

## 2025-06-19 PROCEDURE — 74177 CT ABD & PELVIS W/CONTRAST: CPT

## 2025-06-19 RX ADMIN — IOHEXOL 100 ML: 350 INJECTION, SOLUTION INTRAVENOUS at 09:19

## 2025-06-25 ENCOUNTER — TELEPHONE (OUTPATIENT)
Dept: CARDIOLOGY | Facility: MEDICAL CENTER | Age: 80
End: 2025-06-25
Payer: MEDICARE

## 2025-06-25 NOTE — TELEPHONE ENCOUNTER
DEYVI          Caller: Neli Hernandez      Topic/issue: Patient was calling to see that we had gotten imaging so that it could be reviewed at her next appointment and also reviewing her medications. She had completed the imaging on 6/19/25. Please advise        Callback Number: [phone]        Thank you    -Kennedy CHEN

## 2025-06-25 NOTE — TELEPHONE ENCOUNTER
"Phone Number Called: [phone]    Call outcome: Spoke to patient regarding message below.    Message: Called to discuss further. Pt said she had \"body scan to review my liver\". Noted in chart, pt had CT-ABDOMEN PELVIS on 06/20/25 and finalized results are in chart.    She said her family has history of tumors and she has also recent liver issues. She had requested her doctor to order the imaging for further evaluation and wanted to make sure DEYVI was aware of results and to review at her [facility] on 07/28/25. Advised pt we will update her appointment notes to include this so DEYVI and the MA team will be aware of her request.    She will also bring her list of medications to her [facility]. She said although her dad had heart issues, she has never had any swelling. She's been on a water pill (Maxzide-HCTZ), so she wants to discuss this further at her visit. She is currently taking a supplement to help with her liver function and she has had recent diet modifications.    Pt had no other questions or concerns, advised her to reach out to our office prior to her appointment if any arise. She wanted to acknowledge our office staff, she is very pleased with Dr. Borja and \"everyone in the office gets a gold star.\" Appreciated pt for her kind words. She was appreciative of call back.  "

## 2025-07-13 DIAGNOSIS — I10 BENIGN HYPERTENSION: ICD-10-CM

## 2025-07-13 PROCEDURE — RXMED WILLOW AMBULATORY MEDICATION CHARGE: Performed by: INTERNAL MEDICINE

## 2025-07-14 PROCEDURE — RXMED WILLOW AMBULATORY MEDICATION CHARGE: Performed by: FAMILY MEDICINE

## 2025-07-14 RX ORDER — TRIAMTERENE AND HYDROCHLOROTHIAZIDE 37.5; 25 MG/1; MG/1
1 TABLET ORAL
Qty: 100 TABLET | Refills: 3 | Status: SHIPPED | OUTPATIENT
Start: 2025-07-14 | End: 2025-07-28

## 2025-07-15 ENCOUNTER — PHARMACY VISIT (OUTPATIENT)
Dept: PHARMACY | Facility: MEDICAL CENTER | Age: 80
End: 2025-07-15
Payer: COMMERCIAL

## 2025-07-18 PROCEDURE — RXMED WILLOW AMBULATORY MEDICATION CHARGE: Performed by: INTERNAL MEDICINE

## 2025-07-21 ENCOUNTER — HOSPITAL ENCOUNTER (OUTPATIENT)
Dept: LAB | Facility: MEDICAL CENTER | Age: 80
End: 2025-07-21
Attending: INTERNAL MEDICINE
Payer: MEDICARE

## 2025-07-21 ENCOUNTER — PHARMACY VISIT (OUTPATIENT)
Dept: PHARMACY | Facility: MEDICAL CENTER | Age: 80
End: 2025-07-21
Payer: COMMERCIAL

## 2025-07-21 DIAGNOSIS — E78.2 MIXED HYPERLIPIDEMIA: ICD-10-CM

## 2025-07-21 LAB
ALBUMIN SERPL BCP-MCNC: 4.5 G/DL (ref 3.2–4.9)
ALBUMIN/GLOB SERPL: 1.4 G/DL
ALP SERPL-CCNC: 55 U/L (ref 30–99)
ALT SERPL-CCNC: 63 U/L (ref 2–50)
ANION GAP SERPL CALC-SCNC: 13 MMOL/L (ref 7–16)
AST SERPL-CCNC: 44 U/L (ref 12–45)
BILIRUB SERPL-MCNC: 0.4 MG/DL (ref 0.1–1.5)
BUN SERPL-MCNC: 36 MG/DL (ref 8–22)
CALCIUM ALBUM COR SERPL-MCNC: 9.6 MG/DL (ref 8.5–10.5)
CALCIUM SERPL-MCNC: 10 MG/DL (ref 8.5–10.5)
CHLORIDE SERPL-SCNC: 103 MMOL/L (ref 96–112)
CHOLEST SERPL-MCNC: 129 MG/DL (ref 100–199)
CO2 SERPL-SCNC: 22 MMOL/L (ref 20–33)
CREAT SERPL-MCNC: 1.08 MG/DL (ref 0.5–1.4)
FASTING STATUS PATIENT QL REPORTED: NORMAL
GFR SERPLBLD CREATININE-BSD FMLA CKD-EPI: 52 ML/MIN/1.73 M 2
GLOBULIN SER CALC-MCNC: 3.2 G/DL (ref 1.9–3.5)
GLUCOSE SERPL-MCNC: 112 MG/DL (ref 65–99)
HDLC SERPL-MCNC: 26 MG/DL
LDLC SERPL CALC-MCNC: 57 MG/DL
POTASSIUM SERPL-SCNC: 4 MMOL/L (ref 3.6–5.5)
PROT SERPL-MCNC: 7.7 G/DL (ref 6–8.2)
SODIUM SERPL-SCNC: 138 MMOL/L (ref 135–145)
TRIGL SERPL-MCNC: 231 MG/DL (ref 0–149)

## 2025-07-21 PROCEDURE — 80061 LIPID PANEL: CPT

## 2025-07-21 PROCEDURE — 80053 COMPREHEN METABOLIC PANEL: CPT

## 2025-07-21 PROCEDURE — 36415 COLL VENOUS BLD VENIPUNCTURE: CPT

## 2025-07-23 ENCOUNTER — RESULTS FOLLOW-UP (OUTPATIENT)
Dept: CARDIOLOGY | Facility: MEDICAL CENTER | Age: 80
End: 2025-07-23
Payer: MEDICARE

## 2025-07-28 ENCOUNTER — OFFICE VISIT (OUTPATIENT)
Facility: MEDICAL CENTER | Age: 80
End: 2025-07-28
Attending: INTERNAL MEDICINE
Payer: MEDICARE

## 2025-07-28 VITALS
BODY MASS INDEX: 22.82 KG/M2 | OXYGEN SATURATION: 96 % | DIASTOLIC BLOOD PRESSURE: 62 MMHG | RESPIRATION RATE: 16 BRPM | SYSTOLIC BLOOD PRESSURE: 112 MMHG | HEART RATE: 80 BPM | WEIGHT: 124 LBS | HEIGHT: 62 IN

## 2025-07-28 DIAGNOSIS — R93.1 AGATSTON CAC SCORE 200-399: Primary | ICD-10-CM

## 2025-07-28 DIAGNOSIS — I10 ESSENTIAL HYPERTENSION: ICD-10-CM

## 2025-07-28 DIAGNOSIS — E78.2 MIXED HYPERLIPIDEMIA: ICD-10-CM

## 2025-07-28 PROCEDURE — 99212 OFFICE O/P EST SF 10 MIN: CPT | Performed by: INTERNAL MEDICINE

## 2025-07-28 PROCEDURE — 3074F SYST BP LT 130 MM HG: CPT | Performed by: INTERNAL MEDICINE

## 2025-07-28 PROCEDURE — 99214 OFFICE O/P EST MOD 30 MIN: CPT | Performed by: INTERNAL MEDICINE

## 2025-07-28 PROCEDURE — 3078F DIAST BP <80 MM HG: CPT | Performed by: INTERNAL MEDICINE

## 2025-07-28 ASSESSMENT — ENCOUNTER SYMPTOMS
VOMITING: 0
PSYCHIATRIC NEGATIVE: 1
RESPIRATORY NEGATIVE: 1
WEAKNESS: 0
COUGH: 0
PALPITATIONS: 0
EYES NEGATIVE: 1
CARDIOVASCULAR NEGATIVE: 1
NERVOUS/ANXIOUS: 0
MUSCULOSKELETAL NEGATIVE: 1
NAUSEA: 0
SHORTNESS OF BREATH: 0
DIZZINESS: 0
CONSTITUTIONAL NEGATIVE: 1
WEIGHT LOSS: 0
DEPRESSION: 0
MYALGIAS: 0
CHILLS: 0
ABDOMINAL PAIN: 0
FOCAL WEAKNESS: 0
FEVER: 0
BLURRED VISION: 0
GASTROINTESTINAL NEGATIVE: 1
DOUBLE VISION: 0
BRUISES/BLEEDS EASILY: 0
NEUROLOGICAL NEGATIVE: 1
HEADACHES: 0
CLAUDICATION: 0

## 2025-07-28 ASSESSMENT — FIBROSIS 4 INDEX: FIB4 SCORE: 1.61

## 2025-07-28 NOTE — PROGRESS NOTES
"Chief Complaint   Patient presents with    Other     F/V Dx: Agatston CAC score 200-399    Hypertension    Hyperlipidemia       Subjective     Neli Hernandez is a 79 y.o. female who presents today for follow up of positive CT coronary calcium study, hypertension and hyperlipidemia.    Since the patient's last visit on 04/11/25, she has been doing well clinically from cardiac standpoint. She denies fatigue, chest pain, shortness of breath, palpitations, lower extremity edema, dizziness or syncope. She is starting to exercise.     Past Medical History[1]  Past Surgical History[2]  Family History   Problem Relation Age of Onset    Cancer Maternal Aunt         breast    Stroke Mother         TIA    Heart Disease Father      Social History     Socioeconomic History    Marital status:      Spouse name: Not on file    Number of children: Not on file    Years of education: Not on file    Highest education level: 12th grade   Occupational History    Not on file   Tobacco Use    Smoking status: Never    Smokeless tobacco: Never    Tobacco comments:     continued abstinence   Vaping Use    Vaping status: Never Used   Substance and Sexual Activity    Alcohol use: Yes     Alcohol/week: 0.0 - 0.6 oz     Comment: \"maybe twice a year\"    Drug use: No    Sexual activity: Not Currently     Partners: Male     Birth control/protection: Post-Menopausal   Other Topics Concern    Not on file   Social History Narrative    Neli was born and raised in [place]. 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 She has a daughter, Deena Kinney (46, [employer] [facility]). Hobbies include reading, she has a tower garden in the yard, she enjoys playing online games.     Social Drivers of Health     Financial Resource Strain: Low Risk  (1/10/2024)    Overall Financial Resource Strain (CARDIA)     Difficulty of Paying Living Expenses: Not hard at all   Food Insecurity: No Food " Insecurity (1/10/2024)    Hunger Vital Sign     Worried About Running Out of Food in the Last Year: Never true     Ran Out of Food in the Last Year: Never true   Transportation Needs: No Transportation Needs (1/10/2024)    PRAPARE - Transportation     Lack of Transportation (Medical): No     Lack of Transportation (Non-Medical): No   Physical Activity: Sufficiently Active (12/8/2020)    Exercise Vital Sign     Days of Exercise per Week: 6 days     Minutes of Exercise per Session: 30 min   Stress: No Stress Concern Present (1/10/2024)    [employer] - Occupational Stress Questionnaire     Feeling of Stress : Not at all   Social Connections: Moderately Isolated (1/10/2024)    Social Connection and Isolation Panel [NHANES]     Frequency of Communication with Friends and Family: More than three times a week     Frequency of Social Gatherings with Friends and Family: Not on file     Attends Hindu Services: Never     Active Member of Clubs or Organizations: No     Attends Club or Organization Meetings: 1 to 4 times per year     Marital Status:    Intimate Partner Violence: Not on file   Housing Stability: Low Risk  (1/10/2024)    Housing Stability Vital Sign     Unable to Pay for Housing in the Last Year: No     Number of Places Lived in the Last Year: 1     Unstable Housing in the Last Year: No     Allergies[3]  Encounter Medications[4]  Review of Systems   Constitutional: Negative.  Negative for chills, fever, malaise/fatigue and weight loss.   HENT: Negative.  Negative for hearing loss.    Eyes: Negative.  Negative for blurred vision and double vision.   Respiratory: Negative.  Negative for cough and shortness of breath.    Cardiovascular: Negative.  Negative for chest pain, palpitations, claudication and leg swelling.   Gastrointestinal: Negative.  Negative for abdominal pain, nausea and vomiting.   Genitourinary: Negative.  Negative for dysuria and urgency.   Musculoskeletal:  "Negative.  Negative for joint pain and myalgias.   Skin: Negative.  Negative for itching and rash.   Neurological: Negative.  Negative for dizziness, focal weakness, weakness and headaches.   Endo/Heme/Allergies: Negative.  Does not bruise/bleed easily.   Psychiatric/Behavioral: Negative.  Negative for depression. The patient is not nervous/anxious.               Objective     /62 (BP Location: Left arm, Patient Position: Sitting, BP Cuff Size: Adult)   Pulse 80   Resp 16   Ht 1.562 m (5' 1.5\")   Wt 56.2 kg (124 lb)   SpO2 96%   BMI 23.05 kg/m²     Physical Exam  Constitutional:       Appearance: Normal appearance. She is well-developed and normal weight.   HENT:      Head: Normocephalic and atraumatic.   Neck:      Vascular: No JVD.   Cardiovascular:      Rate and Rhythm: Normal rate and regular rhythm.      Heart sounds: Normal heart sounds.   Pulmonary:      Effort: Pulmonary effort is normal.      Breath sounds: Normal breath sounds.   Abdominal:      General: Bowel sounds are normal.      Palpations: Abdomen is soft.      Comments: No hepatosplenomegaly.   Musculoskeletal:         General: Normal range of motion.   Lymphadenopathy:      Cervical: No cervical adenopathy.   Skin:     General: Skin is warm and dry.   Neurological:      Mental Status: She is alert and oriented to person, place, and time.            CARDIAC STUDIES/PROCEDURES:     CT CARDIAC SCORING (02/24/23)  Coronary calcification:  LMA - 0  LCX - 47  LAD - 304  RCA - 1  Total Calcium Score: 352  Percentile: Calcium score is worse than the the 75th percentile for the patient's age and sex.  Other findings:  Heart: Normal size. Normal size ascending aorta, 30 mm. There is moderate descending thoracic aortic atherosclerosis.  IMPRESSION:  Calcium Score of 100-399 AND >75th percentile:     EKG performed on (07/05/23) EKG shows sinus rhythm.     Laboratory results of (07/21/25) were reviewed. Cholesterol profile of 129/231/26/57 mg/dL " noted.   Laboratory results of (04/08/25) were reviewed. Cholesterol profile of 168/388/3/55 mg/dL noted.  Laboratory results of (06/06/24) Cholesterol profile of 161/357/32/58 mg/dL noted.  Laboratory results of (11/29/23) Cholesterol profile of 161/307/32/68 mg/dL noted.     MYOCARDIAL PERFUSION STUDY CONCLUSIONS (11/06/05)  1. NORMAL SPECT-MYOCARDIAL PERFUSION SCAN.   2. LEFT VENTRICULAR EJECTION FRACTION IS 68%.     Assessment & Plan     1. Agatston CAC score 200-399        2. Essential hypertension        3. Mixed hyperlipidemia            Medical Decision Making: Today's Assessment/Status/Plan:        Positive CT coronary calcium study:  She is a 79 year old female with positive CT coronary calcium study, hypertension and hyperlipidemia. She is clinically doing well from coronary standpoint. We will continue with current medical therapy including metoprolol, losartan, lovastatin.  Hypertension: Currently, the average blood pressure is well controlled. We will continue with beta blockade therapy and angiotensin receptor blockade.We will discontinue triamterene-hydrochlorothiazide.   Hyperlipidemia: She is doing well on statin and fenofibrate therapy without myalgia symptoms. She will reduce her carbohydrate intake.  Health maintenance: We have discussed the options of performing cardiac studies, however, she has not wished to scheduled any procedures at this time.      We will follow up the patient in 1 year.    CC Sean Mars                    [1]   Past Medical History:  Diagnosis Date    Atypical chest pain 01/23/2014    CAD (coronary artery disease)     Cataract     Change in stool habits 12/20/2022    She reports her BM's have slowly changed over recent time. She will have soft stools 3-4 times a day and then over a period of days it will transition into diarrhea and then reset. This cycles about every 7-10 days. No changes in eating habits. Thyroid has been well controlled. No history of  Hpylori, SIBO, or Celiac disease. No concerns on colonoscopy in June 2021. No associated weight loss.    Elevated coronary artery calcium score 07/05/2023    Family history of coronary artery disease 01/23/2014    H/O partial thyroidectomy 01/23/2014    Resection due to tumor, found to be benign on pathology. Performed in her 40's. She has remained on thyroid replacement since that time without issue.    Hyperlipidemia     Hypertension     Hypothyroidism, postop     Major depressive disorder, recurrent episode, mild (HCC) 02/23/2023    Depression Screening (2/2023)     Little interest or pleasure in doing things?  1 - several days   Feeling down, depressed , or hopeless? 1 - several days   Trouble falling or staying asleep, or sleeping too much?  2 - more than half the days   Feeling tired or having little energy?  2 - more than half the days   Poor appetite or overeating?  0 - not at all   Feeling bad about yourself - or that y    Menopause 11/27/2019    History of menopause and osteopenia with outdated bone density examination.    Nonspecific abnormal function study, cardiovascular 12/20/2022    Pain of both hip joints 01/20/2017    Pain of left great toe 12/20/2022    She reports left toe pain with associated erythema and swelling last week, no warmth noted. Occurred at base of first metatarsal on the left foot, improved after a few days. No recent uric acid level. Eats a healthy diet with limited animal products.    Prediabetes 07/15/2019      Ref. Range 12/13/2021 11:48  Glycohemoglobin Latest Ref Range: 4.0 - 5.6 % 5.3  Estim. Avg Glu Latest Units: mg/dL 105   A1c 6.0 in mid August 2019. No prior A1c checks, she denies prior knowledge of pre-diabetes. She reports stable weight and has a normal BMI. She has had a decompensation of her triglycerides on recent analysis. No blurred vision, polyuria, or polydipsia. She has brought down h   [2]   Past Surgical History:  Procedure Laterality Date    APPENDECTOMY       LAMINOTOMY      cervical fusion; disc bulge?    PRIMARY C SECTION      cervix repair and c section    THYROIDECTOMY      TONSILLECTOMY      US-CYST ASPIRATION-BREAST INITIAL Left 1990's   [3]   Allergies  Allergen Reactions    Penicillins    [4]   Outpatient Encounter Medications as of 7/28/2025   Medication Sig Dispense Refill    triamterene-hctz (MAXZIDE-25/DYAZIDE) 37.5-25 MG Tab TAKE ONE TABLET BY MOUTH EVERY  Tablet 3    metoprolol SR (TOPROL XL) 25 MG TABLET SR 24 HR Take 1 Tablet by mouth every day. 100 Tablet 3    losartan (COZAAR) 100 MG Tab TAKE ONE TABLET BY MOUTH ONE TIME DAILY 100 Tablet 3    rosuvastatin (CRESTOR) 10 MG Tab Take 1 Tablet by mouth every evening. 100 Tablet 3    fenofibrate (TRICOR) 145 MG Tab Take 1 Tablet by mouth every day. 100 Tablet 3    levothyroxine (SYNTHROID) 50 MCG Tab Take 1 tablet(50mcg) by mouth 5 days per week and 2 tabs(100mcg) on Tues/Thurs 124 Tablet 3    ezetimibe (ZETIA) 10 MG Tab Take 1 Tablet by mouth every day. 100 Tablet 3    Phytosterol Esters (CHOLEST CARE PO) Take 800 mg by mouth 3 times a day. CholestaCare- Propriatery Sterol Blend      coenzyme Q-10 100 MG Cap capsule Take 100 mg by mouth every day.      aspirin EC (ECOTRIN) 81 MG Tablet Delayed Response Take 1 Tablet by mouth every day. 100 Tablet 3    Cholecalciferol (VITAMIN D3) 2000 UNIT Cap Take 1 Capsule by mouth every day. 100 Capsule 3    [DISCONTINUED] Omega-3 Fatty Acids (FISH OIL) 1000 MG Cap capsule Take 1,000 mg by mouth 3 times a day with meals.       No facility-administered encounter medications on file as of 7/28/2025.

## 2025-08-04 DIAGNOSIS — E78.2 MIXED HYPERLIPIDEMIA: ICD-10-CM

## 2025-08-04 DIAGNOSIS — R93.1 ELEVATED CORONARY ARTERY CALCIUM SCORE: ICD-10-CM

## 2025-08-05 ENCOUNTER — PHARMACY VISIT (OUTPATIENT)
Dept: PHARMACY | Facility: MEDICAL CENTER | Age: 80
End: 2025-08-05
Payer: COMMERCIAL

## 2025-08-05 PROCEDURE — RXMED WILLOW AMBULATORY MEDICATION CHARGE: Performed by: INTERNAL MEDICINE

## 2025-08-05 RX ORDER — EZETIMIBE 10 MG/1
10 TABLET ORAL DAILY
Qty: 100 TABLET | Refills: 3 | Status: SHIPPED | OUTPATIENT
Start: 2025-08-05

## 2025-08-11 ENCOUNTER — OFFICE VISIT (OUTPATIENT)
Dept: MEDICAL GROUP | Facility: PHYSICIAN GROUP | Age: 80
End: 2025-08-11
Payer: MEDICARE

## 2025-08-11 VITALS
HEART RATE: 94 BPM | BODY MASS INDEX: 23.59 KG/M2 | WEIGHT: 128.2 LBS | DIASTOLIC BLOOD PRESSURE: 60 MMHG | RESPIRATION RATE: 14 BRPM | OXYGEN SATURATION: 94 % | SYSTOLIC BLOOD PRESSURE: 110 MMHG | TEMPERATURE: 98.2 F | HEIGHT: 62 IN

## 2025-08-11 DIAGNOSIS — N18.31 CKD STAGE 3A, GFR 45-59 ML/MIN: ICD-10-CM

## 2025-08-11 DIAGNOSIS — K80.20 CALCULUS OF GALLBLADDER WITHOUT CHOLECYSTITIS WITHOUT OBSTRUCTION: ICD-10-CM

## 2025-08-11 DIAGNOSIS — N95.8 GENITOURINARY SYNDROME OF MENOPAUSE: ICD-10-CM

## 2025-08-11 DIAGNOSIS — K57.90 DIVERTICULOSIS: ICD-10-CM

## 2025-08-11 DIAGNOSIS — Z98.890 HISTORY OF NECK SURGERY: ICD-10-CM

## 2025-08-11 DIAGNOSIS — E03.9 ACQUIRED HYPOTHYROIDISM: ICD-10-CM

## 2025-08-11 DIAGNOSIS — R73.03 PREDIABETES: ICD-10-CM

## 2025-08-11 DIAGNOSIS — E55.9 VITAMIN D DEFICIENCY: ICD-10-CM

## 2025-08-11 DIAGNOSIS — N39.41 URGE INCONTINENCE OF URINE: Primary | ICD-10-CM

## 2025-08-11 DIAGNOSIS — M47.812 CERVICAL ARTHRITIS: ICD-10-CM

## 2025-08-11 PROCEDURE — 3074F SYST BP LT 130 MM HG: CPT | Performed by: INTERNAL MEDICINE

## 2025-08-11 PROCEDURE — G2211 COMPLEX E/M VISIT ADD ON: HCPCS | Performed by: INTERNAL MEDICINE

## 2025-08-11 PROCEDURE — 99214 OFFICE O/P EST MOD 30 MIN: CPT | Performed by: INTERNAL MEDICINE

## 2025-08-11 PROCEDURE — 3078F DIAST BP <80 MM HG: CPT | Performed by: INTERNAL MEDICINE

## 2025-08-11 RX ORDER — ESTRADIOL 0.1 MG/G
1 CREAM VAGINAL
Qty: 42.5 G | Refills: 3 | Status: SHIPPED | OUTPATIENT
Start: 2025-08-11

## 2025-08-11 ASSESSMENT — FIBROSIS 4 INDEX: FIB4 SCORE: 1.61

## 2025-08-12 PROCEDURE — RXMED WILLOW AMBULATORY MEDICATION CHARGE: Performed by: INTERNAL MEDICINE

## 2025-08-13 ENCOUNTER — PHARMACY VISIT (OUTPATIENT)
Dept: PHARMACY | Facility: MEDICAL CENTER | Age: 80
End: 2025-08-13
Payer: COMMERCIAL

## 2025-08-23 PROCEDURE — RXMED WILLOW AMBULATORY MEDICATION CHARGE: Performed by: INTERNAL MEDICINE

## 2025-08-26 ENCOUNTER — PHARMACY VISIT (OUTPATIENT)
Dept: PHARMACY | Facility: MEDICAL CENTER | Age: 80
End: 2025-08-26
Payer: COMMERCIAL